# Patient Record
Sex: MALE | Race: WHITE | NOT HISPANIC OR LATINO | Employment: OTHER | URBAN - METROPOLITAN AREA
[De-identification: names, ages, dates, MRNs, and addresses within clinical notes are randomized per-mention and may not be internally consistent; named-entity substitution may affect disease eponyms.]

---

## 2017-04-13 ENCOUNTER — GENERIC CONVERSION - ENCOUNTER (OUTPATIENT)
Dept: OTHER | Facility: OTHER | Age: 67
End: 2017-04-13

## 2017-06-16 ENCOUNTER — GENERIC CONVERSION - ENCOUNTER (OUTPATIENT)
Dept: OTHER | Facility: OTHER | Age: 67
End: 2017-06-16

## 2017-08-15 ENCOUNTER — ALLSCRIPTS OFFICE VISIT (OUTPATIENT)
Dept: OTHER | Facility: OTHER | Age: 67
End: 2017-08-15

## 2017-08-15 DIAGNOSIS — E03.9 HYPOTHYROIDISM: ICD-10-CM

## 2017-08-15 DIAGNOSIS — K21.9 GASTRO-ESOPHAGEAL REFLUX DISEASE WITHOUT ESOPHAGITIS: ICD-10-CM

## 2017-08-15 DIAGNOSIS — I10 ESSENTIAL (PRIMARY) HYPERTENSION: ICD-10-CM

## 2017-08-15 DIAGNOSIS — F32.9 MAJOR DEPRESSIVE DISORDER, SINGLE EPISODE: ICD-10-CM

## 2017-08-15 DIAGNOSIS — R73.9 HYPERGLYCEMIA: ICD-10-CM

## 2017-08-15 DIAGNOSIS — F41.9 ANXIETY DISORDER: ICD-10-CM

## 2017-08-15 DIAGNOSIS — R07.9 CHEST PAIN: ICD-10-CM

## 2017-08-16 ENCOUNTER — GENERIC CONVERSION - ENCOUNTER (OUTPATIENT)
Dept: OTHER | Facility: OTHER | Age: 67
End: 2017-08-16

## 2017-08-16 ENCOUNTER — APPOINTMENT (OUTPATIENT)
Dept: LAB | Facility: CLINIC | Age: 67
End: 2017-08-16
Payer: MEDICARE

## 2017-08-16 ENCOUNTER — TRANSCRIBE ORDERS (OUTPATIENT)
Dept: LAB | Facility: CLINIC | Age: 67
End: 2017-08-16

## 2017-08-16 DIAGNOSIS — F41.9 ANXIETY DISORDER: ICD-10-CM

## 2017-08-16 DIAGNOSIS — R73.9 HYPERGLYCEMIA: ICD-10-CM

## 2017-08-16 DIAGNOSIS — E03.9 HYPOTHYROIDISM: ICD-10-CM

## 2017-08-16 DIAGNOSIS — I10 ESSENTIAL (PRIMARY) HYPERTENSION: ICD-10-CM

## 2017-08-16 DIAGNOSIS — K21.9 GASTRO-ESOPHAGEAL REFLUX DISEASE WITHOUT ESOPHAGITIS: ICD-10-CM

## 2017-08-16 DIAGNOSIS — F32.9 MAJOR DEPRESSIVE DISORDER, SINGLE EPISODE: ICD-10-CM

## 2017-08-16 LAB
ALBUMIN SERPL BCP-MCNC: 3.8 G/DL (ref 3.5–5)
ALP SERPL-CCNC: 59 U/L (ref 46–116)
ALT SERPL W P-5'-P-CCNC: 19 U/L (ref 12–78)
ANION GAP SERPL CALCULATED.3IONS-SCNC: 7 MMOL/L (ref 4–13)
AST SERPL W P-5'-P-CCNC: 23 U/L (ref 5–45)
BILIRUB SERPL-MCNC: 0.41 MG/DL (ref 0.2–1)
BUN SERPL-MCNC: 16 MG/DL (ref 5–25)
CALCIUM SERPL-MCNC: 8.8 MG/DL (ref 8.3–10.1)
CHLORIDE SERPL-SCNC: 105 MMOL/L (ref 100–108)
CHOLEST SERPL-MCNC: 200 MG/DL (ref 50–200)
CO2 SERPL-SCNC: 28 MMOL/L (ref 21–32)
CREAT SERPL-MCNC: 1.03 MG/DL (ref 0.6–1.3)
GFR SERPL CREATININE-BSD FRML MDRD: 75 ML/MIN/1.73SQ M
GLUCOSE P FAST SERPL-MCNC: 94 MG/DL (ref 65–99)
HDLC SERPL-MCNC: 56 MG/DL (ref 40–60)
LDLC SERPL CALC-MCNC: 127 MG/DL (ref 0–100)
POTASSIUM SERPL-SCNC: 4.3 MMOL/L (ref 3.5–5.3)
PROT SERPL-MCNC: 7.4 G/DL (ref 6.4–8.2)
SODIUM SERPL-SCNC: 140 MMOL/L (ref 136–145)
TRIGL SERPL-MCNC: 87 MG/DL
TSH SERPL DL<=0.05 MIU/L-ACNC: 6.06 UIU/ML (ref 0.36–3.74)

## 2017-08-16 PROCEDURE — 80053 COMPREHEN METABOLIC PANEL: CPT

## 2017-08-16 PROCEDURE — 80061 LIPID PANEL: CPT

## 2017-08-16 PROCEDURE — 36415 COLL VENOUS BLD VENIPUNCTURE: CPT

## 2017-08-16 PROCEDURE — 84443 ASSAY THYROID STIM HORMONE: CPT

## 2017-08-21 ENCOUNTER — HOSPITAL ENCOUNTER (OUTPATIENT)
Dept: NON INVASIVE DIAGNOSTICS | Facility: CLINIC | Age: 67
Discharge: HOME | End: 2017-08-21
Payer: MEDICARE

## 2017-08-21 ENCOUNTER — GENERIC CONVERSION - ENCOUNTER (OUTPATIENT)
Dept: OTHER | Facility: OTHER | Age: 67
End: 2017-08-21

## 2017-08-21 DIAGNOSIS — R07.9 CHEST PAIN: ICD-10-CM

## 2017-08-21 LAB
ARRHY DURING EX: NORMAL
CHEST PAIN STATEMENT: NORMAL
MAX DIASTOLIC BP: 74 MMHG
MAX HEART RATE: 136 BPM
MAX PREDICTED HEART RATE: 154 BPM
MAX. SYSTOLIC BP: 172 MMHG
PROTOCOL NAME: NORMAL
TARGET HR FORMULA: NORMAL
TEST INDICATION: NORMAL
TIME IN EXERCISE PHASE: 719 S

## 2017-08-21 PROCEDURE — 93350 STRESS TTE ONLY: CPT

## 2017-09-21 ENCOUNTER — ALLSCRIPTS OFFICE VISIT (OUTPATIENT)
Dept: OTHER | Facility: OTHER | Age: 67
End: 2017-09-21

## 2017-10-11 DIAGNOSIS — E03.9 HYPOTHYROIDISM: ICD-10-CM

## 2017-10-27 NOTE — PROGRESS NOTES
Discussion/Summary  Cardiology Discussion Summary Free Text Note Form [facility]:   # Chest heaviness: Atypical symptoms w/ some typical features  He has had a stress echo 8/21/17 that showed upsloping ST depressions during exercise that became downsloping post exercise and borderline for ischemia --1mm  However there were no WMA  Given this finding and the fact that he reached 11 METs, we will continue to monitor him  If his symptoms progress, we will re-evaluate further testing  Possible MSK as appears to be related to lifting  Losartantx of anxiety and depressiongraded exercise program  Patient educated on alarm symptoms to watch for (ie progressive heaviness, chest pain that does not resolve, etc)  He will call the clinic or call 911  in 3 months  Chief Complaint  Chief Complaint Free Text Note Form: Chest heaviness      History of Present Illness  Cardiology HPI [facility] Note [facility]: Very pleasant 77year old gentleman w/ PMHx of HTN, Depression, Anxiety, and hypothryoidism p/f evaluation of chest heaviness  It occurs with light activity and can last 5-6 minutes with improvement  Sometimes after he rests, he can do more strenuous activity  No problems going up stairs  He gets winded going up one flight of stairs  The heaviness comes on more with light lifting, but once with heavy lifting  He feels it is much more frequent since may or june  As a result he had a stress echo on 8/21/17 that showed upsloping ST depressions during exercise that became downsloping post exercise and borderline for ischemia --1mm  However there were no WMA  Review of Systems  Cardiology Male ROS:     Cardiac: as noted in HPI  Skin: No complaints of nonhealing sores or skin rash  Genitourinary: No complaints of recurrent urinary tract infections, frequent urination at night, difficult urination, blood in urine, kidney stones, loss of bladder control, no kidney or prostate problems, no erectile dysfunction  Psychological: No complaints of feeling depressed, anxiety, panic attacks, or difficulty concentrating  General: No complaints of trouble sleeping, lack of energy, fatigue, appetite changes, weight changes, fever, frequent infections, or night sweats  Respiratory: No complaints of shortness of breath, cough with sputum, or wheezing  HEENT: No complaints of serious problems, hearing problems, nose problems, throat problems, or snoring  Gastrointestinal: No complaints of liver problems, nausea, vomiting, heartburn, constipation, bloody stools, diarrhea, problems swallowing, adbominal pain, or rectal bleeding  Hematologic: No complaints of bleeding disorders, anemia, blood clots, or excessive brusing  Neurological: No complaints of numbness, tingling, dizziness, weakness, seizures, headaches, syncope or fainting, AM fatigue, daytime sleepiness, no witnessed apnea episodes  Musculoskeletal: No complaints of arthritis, back pain, or painfull swelling  ROS Reviewed:   ROS reviewed  Active Problems  Problems    1  Abnormal EKG (794 31) (R94 31)   2  Acute viral pharyngitis (462) (J02 8,B97 89)   3  Anemia (285 9) (D64 9)   4  Anxiety (300 00) (F41 9)   5  Bladder cancer (188 9) (C67 9)   6  Cataract (366 9) (H26 9)   7  Chest discomfort (786 59) (R07 89)   8  Chest pain on exertion (786 50) (R07 9)   9  Depression (311) (F32 9)   10  Double vision (368 2) (H53 2)   11  Equivocal stress test (794 39) (R94 39)   12  GERD without esophagitis (530 81) (K21 9)   13  Hyperglycemia (790 29) (R73 9)   14  Hypertension (401 9) (I10)   15  Hypothyroidism (244 9) (E03 9)   16  Medicare welcome exam (V70 0) (Z00 00)   17  Neck pain (723 1) (M54 2)   18  Need for influenza vaccination (V04 81) (Z23)   19  Need for pneumococcal vaccination (V03 82) (Z23)   20  Pre-op examination (V72 84) (Z01 818)   21  Screening for AAA (abdominal aortic aneurysm) (V81 2) (Z13 6)   22   Skin lump of leg, left (782 2) (R22 42) 23  Welcome to Medicare preventive visit (V70 0) (Z00 00)    Past Medical History  Problems    1  History of Bladder Cancer (V10 51)   2  History of Depression (311) (F32 9)   3  History of anemia (V12 3) (Z86 2)   4  History of hypertension (V12 59) (Z86 79)   5  History of hypothyroidism (V12 29) (Z86 39)  Active Problems And Past Medical History Reviewed: The active problems and past medical history were reviewed and updated today  Surgical History  Problems    1  History of Bladder Surgery   2  History of Cholecystectomy Laparoscopic   3  History of Knee Arthroscopy (Therapeutic)  Surgical History Reviewed: The surgical history was reviewed and updated today  Family History  Mother    1  Family history of Breast Cancer (V16 3)   2  Family history of Generalized Anxiety Disorder   3  Family history of Stroke Syndrome (V17 1)  Father    4  Family history of Alzheimer Disease   5  Family history of Hypertension (V17 49)  Brother    6  Family history of Prostate Cancer (T80 71)  Family History Reviewed: The family history was reviewed and updated today  Social History  Problems    · Activities Leisure Reading Books   · Former smoker (T14 38) (J73 366)   · Marital History - Currently    · No secondhand smoke exposure (V49 89) (Z78 9)   · Recreational Activities Running  Social History Reviewed: The social history was reviewed and updated today  The social history was reviewed and is unchanged  Current Meds   1  LaMICtal 200 MG Oral Tablet (LamoTRIgine); TAKE 1 TABLET TWICE DAILY; Therapy: (Recorded:51Ccg5266) to Recorded   2  Levothyroxine Sodium 112 MCG Oral Tablet; Take 1 tablet by mouth daily; Therapy: 78Ylq5793 to (Evaluate:59Rzp1451)  Requested for: 21Osn1911; Last   Rx:46Upq7407 Ordered   3  Lexapro 5 MG Oral Tablet (Escitalopram Oxalate); TAKE 1 TABLET DAILY; Therapy: ([facility]) to Recorded   4   Losartan Potassium 50 MG Oral Tablet; TAKE 1 TABLET DAILY; Therapy: 40Lel7175 to ([phone])  Requested for: 35MCC3442; Last   Rx:58Ixb1382 Ordered   5  TraZODone HCl - 100 MG Oral Tablet; TAKE 1 TABLET AT BEDTIME; Therapy: 24SAE0136 to Recorded   6  Wellbutrin  MG Oral Tablet Extended Release 24 Hour (BuPROPion HCl ER (XL));   TAKE 1 TABLET DAILY; Therapy: (Recorded:21Sep2017) to Recorded  Medication List Reviewed: The medication list was reviewed and updated today  Allergies  Medication    1  No Known Drug Allergies    Vitals  Vital Signs    Recorded: 21THG3382 02:31PM   Heart Rate 60, L Radial   Systolic 438, RUE, Sitting   Diastolic 60, RUE, Sitting   BP CUFF SIZE Large   Height 5 ft 7 in   Weight 168 lb    BMI Calculated 26 31   BSA Calculated 1 88   O2 Saturation 98     Physical Exam    Constitutional   General appearance: No acute distress, well appearing and well nourished  Eyes   Conjunctiva and Sclera examination: Conjunctiva pink, sclera anicteric  Ears, Nose, Mouth, and Throat - Oropharynx: Clear, nares are clear, mucous membranes are moist    Neck   Neck and thyroid: Normal, supple, trachea midline, no thyromegaly  Pulmonary   Respiratory effort: No increased work of breathing or signs of respiratory distress  Auscultation of lungs: Clear to auscultation, no rales, no rhonchi, no wheezing, good air movement  Cardiovascular   Auscultation of heart: Normal rate and rhythm, normal S1 and S2, no murmurs  Carotid pulses: Normal, 2+ bilaterally  Peripheral vascular exam: Normal pulses throughout, no tenderness, erythema or swelling  Pedal pulses: Normal, 2+ bilaterally  Examination of extremities for edema and/or varicosities: Normal     Abdomen   Abdomen: Non-tender and no distention  Liver and spleen: No hepatomegaly or splenomegaly  Musculoskeletal Gait and station: Normal gait  -- Digits and nails: Normal without clubbing or cyanosis  -- Inspection/palpation of joints, bones, and muscles: Normal, ROM normal     Skin - Skin and subcutaneous tissue: Normal without rashes or lesions  Skin is warm and well perfused, normal turgor  Neurologic - Cranial nerves: II - XII intact  -- Speech: Normal     Psychiatric - Orientation to person, place, and time: Normal -- Mood and affect: Normal       Signatures   Electronically signed by : ESTRELLITA Garcia  Sep 21 2017  2:50PM EST                       (Author)

## 2017-12-01 ENCOUNTER — ALLSCRIPTS OFFICE VISIT (OUTPATIENT)
Dept: OTHER | Facility: OTHER | Age: 67
End: 2017-12-01

## 2017-12-05 NOTE — PROGRESS NOTES
Discussion/Summary  Cardiology Discussion Summary Free Text Note Form [facility]:   # Chest heaviness: Atypical symptoms w/ some typical features  He has had a stress echo 8/21/17 that showed upsloping ST depressions during exercise that became downsloping post exercise and borderline for ischemia --1mm  However there were no WMA  Given this finding and the fact that he reached 11 METs, we will continue to monitor him  If his symptoms progress, we will re-evaluate with further testing  Possible MSK as appears to be related to lifting  Losartantx of anxiety and depressiongraded exercise program  Patient educated on alarm symptoms to watch for (ie progressive heaviness, chest pain that does not resolve, etc)  He will call the clinic or call 911   prn  Chief Complaint  Chief Complaint Free Text Note Form: F/U      History of Present Illness  Cardiology HPI Free Text Note [facility]: Very pleasant 77year old gentleman w/ PMHx of HTN, Depression, Anxiety, and hypothryoidism p/f evaluation of chest heaviness  It occurs with light activity and can last 5-6 minutes with improvement  Sometimes after he rests, he can do more strenuous activity  No problems going up stairs  He gets winded going up one flight of stairs  The heaviness comes on more with light lifting, but once with heavy lifting  He feels it is much more frequent since may or june  As a result he had a stress echo on 8/21/17 that showed upsloping ST depressions during exercise that became downsloping post exercise and borderline for ischemia --1mm  However there were no WMA  12/1/2017, he states he feels well  He went and rode a bike and did elliptical for about an hour without any symptoms  Symptoms more mild and less frequent  Review of Systems  Cardiology Male ROS:    Cardiac: as noted in HPI  Skin: No complaints of nonhealing sores or skin rash    Genitourinary: No complaints of recurrent urinary tract infections, frequent urination at night, difficult urination, blood in urine, kidney stones, loss of bladder control, no kidney or prostate problems, no erectile dysfunction  Psychological: No complaints of feeling depressed, anxiety, panic attacks, or difficulty concentrating  General: No complaints of trouble sleeping, lack of energy, fatigue, appetite changes, weight changes, fever, frequent infections, or night sweats  Respiratory: No complaints of shortness of breath, cough with sputum, or wheezing  HEENT: No complaints of serious problems, hearing problems, nose problems, throat problems, or snoring  Gastrointestinal: No complaints of liver problems, nausea, vomiting, heartburn, constipation, bloody stools, diarrhea, problems swallowing, adbominal pain, or rectal bleeding  Hematologic: No complaints of bleeding disorders, anemia, blood clots, or excessive brusing  Neurological: No complaints of numbness, tingling, dizziness, weakness, seizures, headaches, syncope or fainting, AM fatigue, daytime sleepiness, no witnessed apnea episodes  Musculoskeletal: No complaints of arthritis, back pain, or painfull swelling  ROS Reviewed:   ROS reviewed  Active Problems  Problems    1  Abnormal EKG (794 31) (R94 31)   2  Acute viral pharyngitis (462) (J02 8,B97 89)   3  Anemia (285 9) (D64 9)   4  Anxiety (300 00) (F41 9)   5  Bladder cancer (188 9) (C67 9)   6  Cataract (366 9) (H26 9)   7  Chest discomfort (786 59) (R07 89)   8  Chest pain on exertion (786 50) (R07 9)   9  Depression (311) (F32 9)   10  Double vision (368 2) (H53 2)   11  Equivocal stress test (794 39) (R94 39)   12  GERD without esophagitis (530 81) (K21 9)   13  Hyperglycemia (790 29) (R73 9)   14  Hypertension (401 9) (I10)   15  Hypothyroidism (244 9) (E03 9)   16  Medicare welcome exam (V70 0) (Z00 00)   17  Neck pain (723 1) (M54 2)   18  Need for influenza vaccination (V04 81) (Z23)   19  Need for pneumococcal vaccination (V03 82) (Z23)   20   Pre-op examination (V72 84) (Z01 818)   21  Screening for AAA (abdominal aortic aneurysm) (V81 2) (Z13 6)   22  Skin lump of leg, left (782 2) (R22 42)   23  Welcome to Medicare preventive visit (V70 0) (Z00 00)    Past Medical History  Problems    1  History of Bladder Cancer (V10 51)   2  History of Depression (311) (F32 9)   3  History of anemia (V12 3) (Z86 2)   4  History of hypertension (V12 59) (Z86 79)   5  History of hypothyroidism (V12 29) (Z86 39)  Active Problems And Past Medical History Reviewed: The active problems and past medical history were reviewed and updated today  Surgical History  Problems    1  History of Bladder Surgery   2  History of Cholecystectomy Laparoscopic   3  History of Knee Arthroscopy (Therapeutic)  Surgical History Reviewed: The surgical history was reviewed and updated today  Family History  Mother    1  Family history of Breast Cancer (V16 3)   2  Family history of Generalized Anxiety Disorder   3  Family history of Stroke Syndrome (V17 1)  Father    4  Family history of Alzheimer Disease   5  Family history of Hypertension (V17 49)  Brother    6  Family history of Prostate Cancer (V62 60)  Family History Reviewed: The family history was reviewed and updated today  Social History  Problems    · Activities Leisure Reading Books   · Former smoker (R38 55) (Q88 141)   · Marital History - Currently    · No secondhand smoke exposure (V49 89) (Z78 9)   · Recreational Activities Running  Social History Reviewed: The social history was reviewed and updated today  The social history was reviewed and is unchanged  Current Meds   1  LaMICtal 200 MG Oral Tablet; TAKE 1 TABLET TWICE DAILY; Therapy: (Recorded:56Ifq0934) to Recorded   2  Levothyroxine Sodium 112 MCG Oral Tablet; Take 1 tablet by mouth daily; Therapy: 16Myb6904 to (Evaluate:01Apr2018)  Requested for: 63ICL7582; Last Rx:03Oct2017 Ordered   3  Lexapro 5 MG Oral Tablet; TAKE 1 TABLET DAILY;  Therapy: (Andreas Lombard) to Recorded   4  Losartan Potassium 50 MG Oral Tablet; TAKE 1 TABLET DAILY; Therapy: 13Sep2012 to (Sergio Schwab)  Requested for: 02HRA4521; Last Rx:18Bop3440 Ordered   5  TraZODone HCl - 100 MG Oral Tablet; TAKE 1 TABLET AT BEDTIME; Therapy: 65HQE7242 to Recorded   6  Wellbutrin  MG Oral Tablet Extended Release 24 Hour; TAKE 1 TABLET DAILY; Therapy: (Recorded:21Sep2017) to Recorded  Medication List Reviewed: The medication list was reviewed and updated today  Allergies  Medication    1  No Known Drug Allergies    Vitals  Vital Signs    Recorded: 74ZGM2588 03:26PM   Heart Rate 54   Systolic 584, RUE, Sitting   Diastolic 72, RUE, Sitting   Height 5 ft 7 in   Weight 176 lb 5 oz   BMI Calculated 27 61   BSA Calculated 1 92   O2 Saturation 98       Physical Exam   Constitutional  General appearance: No acute distress, well appearing and well nourished  Eyes  Conjunctiva and Sclera examination: Conjunctiva pink, sclera anicteric  Ears, Nose, Mouth, and Throat - Oropharynx: Clear, nares are clear, mucous membranes are moist   Neck  Neck and thyroid: Normal, supple, trachea midline, no thyromegaly  Pulmonary  Respiratory effort: No increased work of breathing or signs of respiratory distress  Auscultation of lungs: Clear to auscultation, no rales, no rhonchi, no wheezing, good air movement  Cardiovascular  Auscultation of heart: Normal rate and rhythm, normal S1 and S2, no murmurs  Carotid pulses: Normal, 2+ bilaterally  Peripheral vascular exam: Normal pulses throughout, no tenderness, erythema or swelling  Pedal pulses: Normal, 2+ bilaterally  Examination of extremities for edema and/or varicosities: Normal    Abdomen  Abdomen: Non-tender and no distention  Liver and spleen: No hepatomegaly or splenomegaly  Musculoskeletal Gait and station: Normal gait  -- Digits and nails: Normal without clubbing or cyanosis  -- Inspection/palpation of joints, bones, and muscles: Normal, ROM normal  Skin - Skin and subcutaneous tissue: Normal without rashes or lesions  Skin is warm and well perfused, normal turgor  Neurologic - Cranial nerves: II - XII intact  -- Speech: Normal    Psychiatric - Orientation to person, place, and time: Normal -- Mood and affect: Normal       Results/Data  Diagnostic Studies Reviewed Cardio: I personally reviewed the recording/images in the office today  My interpretation follows  Future Appointments    Date/Time Provider Specialty Site   06/22/2018 01:00 PM Emely Dial MD Urology [address] [place] [address]       Signatures   Electronically signed by : ESTRELLITA Marie  Dec  1 2017  3:34PM EST                       (Author)

## 2018-01-11 NOTE — PROGRESS NOTES
Assessment    1  Welcome to Medicare preventive visit (V70 0) (Z00 00)   2  Anxiety (300 00) (F41 9)   3  Depression (311) (F32 9)   4  Screening for AAA (abdominal aortic aneurysm) (V81 2) (Z13 6)   5  Hypothyroidism (244 9) (E03 9)   6  Former smoker (V15 82) (X92 330)    Plan  Health Maintenance, Welcome to Medicare preventive visit    · *VB-Urinary Incontinence Screen (Dx V81 6 Screen for UI); Status:Complete;   Done:  32GPM5256 02:32PM  Hypothyroidism    · (1) TSH; Status:Active; Requested for:82Khc6800;   Medicare welcome exam    · EKG/ECG- POC; Status:Complete;   Done: 06LLP9913 01:57PM  Screening for AAA (abdominal aortic aneurysm), Welcome to Medicare preventive visit    · [address]; Status:Active; Requested GOB:[id number];     Discussion/Summary    #1  Welcome to Medicare examination- EKG done today  AAA screening U/S ordered  Labs up to date  Seeing urology  #2  anxiety/depression- continue with psychiatry f/u and medications  #3  advanced directives- pt encouraged to seek living will/ advanced directives  5 wishes info not available to share with pt at this time  Impression: Welcome to Medicare Visit, with preventive exam as well as age and risk appropriate counseling completed  Cardiovascular screening and counseling: screening is current and EKG recommended  Diabetes screening and counseling: screening is current and counseling was given on ways to improve physical activity  Colorectal cancer screening and counseling: screening is current  Prostate cancer screening and counseling: screening is current  Osteoporosis screening and counseling: screening not indicated  Abdominal aortic aneurysm screening and counseling: screening US recommended  Glaucoma screening and counseling: screening is current  HIV screening and counseling: screening not indicated  Immunizations: (Recommend prevnar 13 this fall one year after his first pneumovax  )     Advance Directive Planning: not complete, he was encouraged to follow-up with me to discuss his questions and/or decisions  Patient Discussion: follow-up visit needed in one year  Chief Complaint  [facility]  a[place]      Advance Directives  Advance Directive [address]:   The patient is not in agreement to receive the Advance Care Planning service    NO - Patient does not have an advance health care directive  History of Present Illness  HPI: Pt  here for his welcome to medicare initial visit/examination  EKG will be done today  No c/o  Does not have advanced directive  Eats healthy but does not exercise due to knee problems and pain  Welcome to [employer] and Wellness Visits: The patient is being seen for the welcome to medicare visit  Medicare Screening and Risk Factors   Hospitalizations: no previous hospitalizations  Medicare Screening Tests Risk Questions   Drug and Alcohol Use: The patient is a former cigarette smoker and quit smoking 1983  The patient reports never drinking alcohol  He has previously used illicit drugs  Diet and Physical Activity: Current diet includes well balanced meals, 1 servings of fruit per day, 2 servings of vegetables per day, 0 75 servings of meat per day, 0 25 servings of whole grains per day, 2 servings of simple carbohydrates per day, 2 servings of dairy products per day, 2 cups of coffee per day and 4 glasses of water  He exercises infrequently  Exercise: walking 1 hours per week  Mood Disorder and Cognitive Impairment Screening: He reports feeling down, depressed, or hopeless over the past two weeks  He denies feeling little interest or pleasure in doing things over the past two weeks  Cognitive impairment screening: denies difficulty learning/retaining new information, denies difficulty handling complex tasks, denies difficulty with reasoning, denies difficulty with spatial ability and orientation, denies difficulty with language and denies difficulty with behavior  Functional Ability/Level of Safety: Hearing is normal bilaterally and a hearing aid is used  He denies hearing difficulties  Activities of daily living details: does not need help using the phone, no transportation help needed, does not need help shopping, no meal preparation help needed, does not need help doing housework, does not need help doing laundry, does not need help managing medications and does not need help managing money  Home safety risk factors:  no grab bars in the bathroom and no handrails on the stairs, but no unfamiliar surroundings, no loose rugs, no poor household lighting, no uneven floors and no household clutter  Advance Directives: Advance directives: no living will, no durable power of  for health care directives and no advance directives  Co-Managers and Medical Equipment/Suppliers: See Patient Care Team   Preventive Quality Program 65 and Older: The patient currently has no urinary incontinence symptoms  Patient Care Team    Care Team Member Role Specialty Office Number   [facility]  Physician Assistant [phone]     Review of Systems    Constitutional: negative  Eyes: negative  ENT: negative  Cardiovascular: negative  Respiratory: negative  Gastrointestinal: negative  Genitourinary: negative  Musculoskeletal: negative  Integumentary and Breasts: negative  Neurological: negative  Psychiatric: negative  Endocrine: negative  Hematologic and Lymphatic: negative  Active Problems    1  Acute viral pharyngitis (462) (J02 8,B97 89)   2  Anemia (285 9) (D64 9)   3  Anxiety (300 00) (F41 9)   4  Bladder cancer (188 9) (C67 9)   5  Depression (311) (F32 9)   6  Double vision (368 2) (H53 2)   7  GERD without esophagitis (530 81) (K21 9)   8  Hyperglycemia (790 29) (R73 9)   9  Hypertension (401 9) (I10)   10  Hypothyroidism (244 9) (E03 9)   11  Neck pain (723 1) (M54 2)   12   Need for pneumococcal vaccination (V03 82) (Z23)    Past Medical History    · History of Bladder Cancer (V10 51)   · History of Depression (311) (F32 9)   · History of anemia (V12 3) (Z86 2)   · History of hypertension (V12 59) (Z86 79)   · History of hypothyroidism (V12 29) (Z86 39)    The active problems and past medical history were reviewed and updated today  Surgical History    · History of Bladder Surgery   · History of Cholecystectomy Laparoscopic   · History of Knee Arthroscopy    The surgical history was reviewed and updated today  Family History  Mother    · Family history of Breast Cancer (V16 3)   · Family history of Generalized Anxiety Disorder   · Family history of Stroke Syndrome (V17 1)  Father    · Family history of Alzheimer Disease   · Family history of Hypertension (V17 49)  Brother    · Family history of Prostate Cancer (V16 42)    The family history was reviewed and updated today  Social History    · Activities Leisure Reading Books   · Former smoker (P15 49) (P32 871)   · quit in [address] [place] [address]   · Marital History - Currently    · No secondhand smoke exposure (V49 89) (Z78 9)   · Recreational Activities Running  The social history was reviewed and updated today  Current Meds   1  LaMICtal 200 MG Oral Tablet; Therapy: (Recorded:23Bcz1929) to Recorded   2  Levothyroxine Sodium 112 MCG Oral Tablet; Take 1 tablet by mouth daily; Therapy: 74Iwl2369 to (Evaluate:67Ekr1333)  Requested for: 43Rwg6282; Last   Rx:60Ssw6446 Ordered   3  Losartan Potassium 50 MG Oral Tablet; TAKE 1 TABLET DAILY; Therapy: 88Ygf4490 to (Evaluate:86Bct9879)  Requested for: 12GZL4020; Last   Rx:09Twg1388 Ordered   4  TraZODone HCl - 150 MG Oral Tablet; Therapy: 75XSO0000 to (Evaluate:10Nov2014) Recorded   5  Wellbutrin  MG Oral Tablet Extended Release 24 Hour; Therapy: (Recorded:01Rmm2450) to Recorded    Allergies    1   No Known Drug Allergies    Immunizations   1    Pneumococcal  85WAF3878    Zoster  70Wxw5988     Vitals  Signs [Data Includes: Current Encounter]    Heart Rate: 60  Systolic: 066, LUE, Sitting  Diastolic: 70, LUE, Sitting  Height: 5 ft 7 in  Weight: 168 lb 2 08 oz  BMI Calculated: 26 33  BSA Calculated: 1 88    Physical Exam    Constitutional   General appearance: No acute distress, well appearing and well nourished  Head and Face   Head and face: Normal     Palpation of the face and sinuses: No sinus tenderness  Eyes   Conjunctiva and lids: No erythema, swelling or discharge  Pupils and irises: Equal, round, reactive to light  Ears, Nose, Mouth, and Throat   External inspection of ears and nose: Normal     Otoscopic examination: Tympanic membranes translucent with normal light reflex  Canals patent without erythema  Hearing: Normal     Nasal mucosa, septum, and turbinates: Normal without edema or erythema  Lips, teeth, and gums: Normal, good dentition  Oropharynx: Normal with no erythema, edema, exudate or lesions  Neck   Neck: Supple, symmetric, trachea midline, no masses  Thyroid: Normal, no thyromegaly  Pulmonary   Respiratory effort: No increased work of breathing or signs of respiratory distress  Auscultation of lungs: Clear to auscultation  Cardiovascular   Auscultation of heart: Normal rate and rhythm, normal S1 and S2, no murmurs  Examination of extremities for edema and/or varicosities: Normal     Abdomen   Abdomen: Non-tender, no masses  Genitourinary sees urology  sees urology  sees urology  Lymphatic   Palpation of lymph nodes in neck: No lymphadenopathy  Musculoskeletal   Gait and station: Normal     Inspection/palpation of digits and nails: Normal without clubbing or cyanosis  Inspection/palpation of joints, bones, and muscles: Normal     Neurologic   Coordination: Normal finger to nose and heel to shin      Psychiatric   Judgment and insight: Normal     Mood and affect: Normal        Results/Data  [employer] 20DYW5731 02:33PM User, s     Test Name Result Flag Reference   SBIRT Screen - Tobacco Screening Result Negative       EKG/ECG- POC 42VQB0524 01:57PM Radha Hutchinson     Test Name Result Flag Reference   EKG/ECG see scanned image         Procedure    Procedure: Visual Acuity Test    Indication: routine screening  Inforrmation supplied by  a Snellen chart     Results: 20/40 in both eyes with corrective device, 20/200 in the right eye with corrective device, 20/40 in the left eye with corrective device      Signatures   Electronically signed by : Ximena Ernandez, [facility]; Jun 8 2016  2:43PM EST                       (Author)    Electronically signed by : ESTRELLITA Jones ; [place] 10 2016  9:49PM EST

## 2018-01-12 VITALS
WEIGHT: 169.13 LBS | HEIGHT: 67 IN | SYSTOLIC BLOOD PRESSURE: 120 MMHG | BODY MASS INDEX: 26.55 KG/M2 | DIASTOLIC BLOOD PRESSURE: 60 MMHG | HEART RATE: 68 BPM

## 2018-01-15 VITALS
BODY MASS INDEX: 26.37 KG/M2 | HEIGHT: 67 IN | SYSTOLIC BLOOD PRESSURE: 140 MMHG | HEART RATE: 60 BPM | OXYGEN SATURATION: 98 % | WEIGHT: 168 LBS | DIASTOLIC BLOOD PRESSURE: 60 MMHG

## 2018-01-16 NOTE — RESULT NOTES
Discussion/Summary   Pls  call pt and let him know that another part of his stress test results hae become available  The EKG part of his stress test was "equivocal" per the reading cardiologist  Even though his stress echo part of the test was normal I would like him to be seen by a  cardiologist before starting to exersice at the gym to be safe and make sure no further  specific testing needs to be done  Verified Results  ECHO STRESS TEST W CONTRAST IF INDICATED 18Jhu7020 02:46PM Jessica Vidales Order Number: [id number]    - Patient Instructions: To schedule this appointment, please contact Central Scheduling at [phone]  Test Name Result Flag Reference   ECHO STRESS TEST W CONTRAST IF INDICATED (Report)     [facility]  [address], [address]   [phone]     Exercise Stress Echocardiography     Study date: 21-Aug-2017     Patient: Emely Welch   MR number: [id number]   Account number: [de-identified]   : 1950   Age: 77 years   Gender: Male   Study date: 21-Aug-2017   Status: Outpatient   Location: [facility] lab   Height: 67 in   Weight: 169 lb   BP: 136// 72 mmHg     Indications: Detection of coronary artery disease  Diagnosis: R07 9 - Chest pain, unspecified     Sonographer: PARTH Garner   Primary Physician: Otis Teixeira kishan [facility]   Referring Physician: Otis Graham PAC   Group: [facility]   RN: Janie Karimi RN BSN   Interpreting Physician: Kelly Leblanc MD     IMPRESSIONS:   Normal study after maximal exercise  The patient had borderline ECG evidence of ischemia without chest pain or echo evidence for ischemia to a HR of 136=86% MPHR  He exercised through stage 4 of the Brant protocol  SUMMARY     STRESS RESULTS:   Duration of exercise was 12 min  Maximal work rate was 11 METs     Maximal heart rate during stress was 136 bpm ( 88 % of maximal predicted heart rate)  Target heart rate was achieved  There was no chest pain during stress  ECG CONCLUSIONS:   The stress ECG was equivocal for ischemia  The patient had borderline ECG evidence of ischemia (upsloping ST depression during exercise which became downsloping post exercise and borderline for ischemia (approx 1 mm)  BASELINE:   Estimated left ventricular ejection fraction was 60 %   HISTORY: The patient is a 77year old male  Chest pain status: no chest pain  Coronary artery disease risk factors: hypertension  Cardiovascular history: none significant  Medications: an antihypertensive agent and thyroid medications  REST ECG: Normal sinus rhythm  Normal baseline ECG  PROCEDURE: The study was performed in the stress lab  The study was performed in the [address] [place] [address]  The procedure was explained to the patient and informed consent was obtained  Treadmill exercise testing was performed,   using the Kimberlee protocol  Stress and rest echocardiographic evaluation with 2D imaging, spectral Doppler, and color Doppler was performed from multiple acoustic windows for evaluation of ventricular function  KIMBERLEE PROTOCOL:   Symptoms   Baseline none     MODIFIED KIMBERLEE PROTOCOL:   HR bpm SBP mmHg DBP mmHg   Baseline 63 136 72   Stage 1 80 140 70   Stage 2 103 140 78   Stage 3 122 147 70   Stage 4 136 172 74   Immediate 136 172 74   Recovery 2 90 166 80   Recovery 5 73 142 70     MEDICATIONS GIVEN: No medications or fluids given  STRESS RESULTS: Duration of exercise was 12 min  The patient exercised to protocol stage 4  Maximal work rate was 11 METs  Maximal heart rate during stress was 136 bpm ( 88 % of maximal predicted heart rate)  Target heart rate was   achieved  The heart rate response to stress was normal  Maximal systolic blood pressure during stress was 172 mmHg  There was normal resting blood pressure with an appropriate response to stress   The rate-pressure product for the peak heart rate and blood pressure was 74124  There was no chest pain during stress  The stress test was terminated due to achievement of maximal (symptom limited) exercise and achievement of target heart rate  ECG CONCLUSIONS: The stress ECG was equivocal for ischemia  The patient had borderline ECG evidence of ischemia (upsloping ST depression during exercise which became downsloping post exercise and borderline for ischemia (approx 1 mm)  Arrhythmia during stress: isolated atrial premature beats  STRESS 2D ECHO RESULTS:     BASELINE: Left ventricular size was normal  Mild LVH  No significant valvular lesions Overall left ventricular systolic function was normal  Estimated left ventricular ejection fraction was 60 %   PEAK STRESS: There was an appropriate reduction in left ventricular size  There was an appropriate augmentation in LV function  Prepared and electronically signed by     Siva Espinosa MD   Signed [id number] 38:09:78       Signatures   Electronically signed by : Deborah Wagner, [facility];  Aug 21 2017  6:53PM EST                       (Author)

## 2018-01-16 NOTE — RESULT NOTES
Discussion/Summary   Please call patient and let him know that his LDL cholesterol is a little elevated and is normal at 127 no medication needed at this point  Recheck yearly  CMP normal however her TSH is mildly elevated  Because the levels only 6 I would like to have the patient continue on his current daily 112 Âµg strength of thyroid supplement and recheck in 6-8 weeks  TSH is ordered  Verified Results  (1) COMPREHENSIVE METABOLIC PANEL [id number] 41:55DN Linda Hendricks Order Number: [id number]     Test Name Result Flag Reference   SODIUM 140 mmol/L  136-145   POTASSIUM 4 3 mmol/L  3 5-5 3   CHLORIDE 105 mmol/L  100-108   CARBON DIOXIDE 28 mmol/L  21-32   ANION GAP (CALC) 7 mmol/L  4-13   BLOOD UREA NITROGEN 16 mg/dL  5-25   CREATININE 1 03 mg/dL  0 60-1 30   Standardized to IDMS reference method   CALCIUM 8 8 mg/dL  8 3-10 1   BILI, TOTAL 0 41 mg/dL  0 20-1 00   ALK PHOSPHATAS 59 U/L     ALT (SGPT) 19 U/L  12-78   Specimen collection should occur prior to Sulfasalazine and/or Sulfapyridine administration due to the potential for falsely depressed results  AST(SGOT) 23 U/L  5-45   Specimen collection should occur prior to Sulfasalazine administration due to the potential for falsely depressed results  ALBUMIN 3 8 g/dL  3 5-5 0   TOTAL PROTEIN 7 4 g/dL  6 4-8 2   eGFR 75 ml/min/1 73sq m     [facility] Disease Education Program recommendations are as follows:  GFR calculation is accurate only with a steady state creatinine  Chronic Kidney disease less than 60 ml/min/1 73 sq  meters  Kidney failure less than 15 ml/min/1 73 sq  meters  GLUCOSE FASTING 94 mg/dL  65-99   Specimen collection should occur prior to Sulfasalazine administration due to the potential for falsely depressed results  Specimen collection should occur prior to Sulfapyridine administration due to the potential for falsely elevated results       (1) TSH 41KRE3676 10:01AM [facility] Order Number: [id number]     Test Name Result Flag Reference   TSH 6 060 uIU/mL H 0 358-3 740   Patients undergoing fluorescein dye angiography may retain small amounts of fluorescein in the body for 48-72 hours post procedure  Samples containing fluorescein can produce falsely depressed TSH values  If the patient had this procedure,a specimen should be resubmitted post fluorescein clearance  (1) LIPID PANEL FASTING W DIRECT LDL REFLEX 03ODU1521 10:01AM Miguel Homans Order Number: [id number]     Test Name Result Flag Reference   CHOLESTEROL 200 mg/dL     LDL CHOLESTEROL CALCULATED 127 mg/dL H 0-100   Triglyceride:        Normal ??? ??? ??? ??? ??? ??? ??? <150 mg/dl   ??? ??? ???Borderline High ??? ??? 150-199 mg/dl   ??? ??? ? ?? High ??? ??? ??? ??? ??? ??? ??? 200-499 mg/dl   ??? ??? ? ??Very High ??? ??? ??? ??? ??? >499 mg/dl      Cholesterol:       Desirable ??? ??? ??? ??? <200 mg/dl   ??? ??? Borderline High ??? 200-239 mg/dl   ??? ??? High ??? ??? ??? ??? ??? ??? >239 mg/dl      HDL Cholesterol:       High ??? ???>59 mg/dL   ??? ??? Low ??? ??? <41 mg/dL      HDL Cholesterol:       High ??? ???>59 mg/dL   ??? ??? Low ??? ??? <41 mg/dL      This screening LDL is a calculated result  It does not have the accuracy of the Direct Measured LDL in the monitoring of patients with hyperlipidemia and/or statin therapy  Direct Measure LDL (BFF977) must be ordered separately in these patients  TRIGLYCERIDES 87 mg/dL  <=150   Specimen collection should occur prior to N-Acetylcysteine or Metamizole administration due to the potential for falsely depressed results  HDL,DIRECT 56 mg/dL  40-60   Specimen collection should occur prior to Metamizole administration due to the potential for falsley depressed results  Plan  Hypothyroidism    · Levothyroxine Sodium 112 MCG Oral Tablet; Take 1 tablet by mouth daily   · (1) TSH WITH FT4 REFLEX; Status:Active;  Requested for:70Rjm7064; Signatures   Electronically signed by : Fernando Velazquez, [facility];  Aug 16 2017  3:26PM EST                       (Author)

## 2018-01-17 NOTE — RESULT NOTES
Message   Pls call pt and let him know his TSH is now WNL  I have ordered a repeat for 6 months  Verified Results  (1) TSH 04Oct2016 01:51PM Mimi Espinosa Order Number: [id number]     Test Name Result Flag Reference   TSH 1 990 uIU/mL  0 358-3 740   Patients undergoing fluorescein dye angiography may retain small amounts of fluorescein in the body for 48-72 hours post procedure  Samples containing fluorescein can produce falsely depressed TSH values  If the patient had this procedure,a specimen should be resubmitted post fluorescein clearance  Plan  Hypothyroidism    · (1) TSH; Status:Active;  Requested for:04Apr2017;     Signatures   Electronically signed by : Luis E Zimmerman [facility]; Oct  4 2016  6:11PM EST                       (Author)

## 2018-01-22 VITALS
HEART RATE: 54 BPM | WEIGHT: 176.31 LBS | OXYGEN SATURATION: 98 % | DIASTOLIC BLOOD PRESSURE: 72 MMHG | BODY MASS INDEX: 27.67 KG/M2 | HEIGHT: 67 IN | SYSTOLIC BLOOD PRESSURE: 136 MMHG

## 2018-01-29 DIAGNOSIS — E03.9 ACQUIRED HYPOTHYROIDISM: Primary | ICD-10-CM

## 2018-01-29 RX ORDER — LEVOTHYROXINE SODIUM 112 UG/1
TABLET ORAL
Qty: 30 TABLET | Refills: 0 | Status: SHIPPED | OUTPATIENT
Start: 2018-01-29 | End: 2018-01-30 | Stop reason: SDUPTHER

## 2018-01-29 NOTE — TELEPHONE ENCOUNTER
LMOM for pt  That blood work would need to be done before next refill and that repeat blood work would be mailed to pt

## 2018-01-29 NOTE — TELEPHONE ENCOUNTER
Please let the patient know that I just refilled a 30 day with no refill supply of his thyroid medicine but he is overdue once again for a TSH as his last 1 was not normal   I have ordered a TSH of we can let him know he needs to have this done prior to his next refill

## 2018-01-30 DIAGNOSIS — E03.9 ACQUIRED HYPOTHYROIDISM: ICD-10-CM

## 2018-01-30 RX ORDER — LEVOTHYROXINE SODIUM 112 UG/1
TABLET ORAL
Qty: 30 TABLET | Refills: 0 | Status: SHIPPED | OUTPATIENT
Start: 2018-01-30 | End: 2018-08-04 | Stop reason: SDUPTHER

## 2018-02-16 DIAGNOSIS — E03.9 HYPOTHYROIDISM: ICD-10-CM

## 2018-06-25 DIAGNOSIS — R39.11 BENIGN PROSTATIC HYPERPLASIA WITH URINARY HESITANCY: Primary | ICD-10-CM

## 2018-06-25 DIAGNOSIS — N40.1 BENIGN PROSTATIC HYPERPLASIA WITH URINARY HESITANCY: Primary | ICD-10-CM

## 2018-06-27 RX ORDER — LAMOTRIGINE 200 MG/1
1 TABLET ORAL 2 TIMES DAILY
COMMUNITY

## 2018-06-27 RX ORDER — BUPROPION HYDROCHLORIDE 300 MG/1
1 TABLET ORAL DAILY
COMMUNITY
End: 2021-06-14

## 2018-06-27 RX ORDER — TRAZODONE HYDROCHLORIDE 50 MG/1
50 TABLET ORAL
COMMUNITY
Start: 2014-07-01

## 2018-06-27 RX ORDER — ESCITALOPRAM OXALATE 5 MG/1
1 TABLET ORAL DAILY
COMMUNITY

## 2018-06-27 RX ORDER — LOSARTAN POTASSIUM 50 MG/1
1 TABLET ORAL DAILY
COMMUNITY
Start: 2012-09-13 | End: 2018-07-06 | Stop reason: SDUPTHER

## 2018-06-29 ENCOUNTER — TRANSCRIBE ORDERS (OUTPATIENT)
Dept: ADMINISTRATIVE | Facility: HOSPITAL | Age: 68
End: 2018-06-29

## 2018-06-29 ENCOUNTER — APPOINTMENT (OUTPATIENT)
Dept: LAB | Facility: MEDICAL CENTER | Age: 68
End: 2018-06-29
Payer: MEDICARE

## 2018-06-29 DIAGNOSIS — E03.9 ACQUIRED HYPOTHYROIDISM: ICD-10-CM

## 2018-06-29 DIAGNOSIS — R39.11 BENIGN PROSTATIC HYPERPLASIA WITH URINARY HESITANCY: ICD-10-CM

## 2018-06-29 DIAGNOSIS — N40.1 BENIGN PROSTATIC HYPERPLASIA WITH URINARY HESITANCY: ICD-10-CM

## 2018-06-29 PROCEDURE — 84443 ASSAY THYROID STIM HORMONE: CPT

## 2018-06-29 PROCEDURE — 36415 COLL VENOUS BLD VENIPUNCTURE: CPT

## 2018-06-29 PROCEDURE — 84153 ASSAY OF PSA TOTAL: CPT

## 2018-06-30 LAB
PSA SERPL-MCNC: 2.4 NG/ML (ref 0–4)
TSH SERPL DL<=0.05 MIU/L-ACNC: 5.71 UIU/ML

## 2018-07-01 DIAGNOSIS — R73.9 HYPERGLYCEMIA: ICD-10-CM

## 2018-07-01 DIAGNOSIS — E03.9 ACQUIRED HYPOTHYROIDISM: Primary | ICD-10-CM

## 2018-07-01 DIAGNOSIS — I10 ESSENTIAL HYPERTENSION: ICD-10-CM

## 2018-07-03 ENCOUNTER — PROCEDURE VISIT (OUTPATIENT)
Dept: UROLOGY | Facility: MEDICAL CENTER | Age: 68
End: 2018-07-03
Payer: MEDICARE

## 2018-07-03 VITALS
BODY MASS INDEX: 26.53 KG/M2 | DIASTOLIC BLOOD PRESSURE: 72 MMHG | SYSTOLIC BLOOD PRESSURE: 136 MMHG | HEIGHT: 67 IN | WEIGHT: 169 LBS

## 2018-07-03 DIAGNOSIS — N40.1 BENIGN PROSTATIC HYPERPLASIA WITH URINARY OBSTRUCTION: ICD-10-CM

## 2018-07-03 DIAGNOSIS — N13.8 BENIGN PROSTATIC HYPERPLASIA WITH URINARY OBSTRUCTION: ICD-10-CM

## 2018-07-03 DIAGNOSIS — C67.2 MALIGNANT NEOPLASM OF LATERAL WALL OF URINARY BLADDER (HCC): Primary | ICD-10-CM

## 2018-07-03 PROBLEM — R94.31 ABNORMAL EKG: Status: ACTIVE | Noted: 2017-08-22

## 2018-07-03 PROBLEM — R94.39 EQUIVOCAL STRESS TEST: Status: ACTIVE | Noted: 2017-08-21

## 2018-07-03 PROBLEM — R07.9 CHEST PAIN ON EXERTION: Status: ACTIVE | Noted: 2017-08-15

## 2018-07-03 LAB
SL AMB  POCT GLUCOSE, UA: NORMAL
SL AMB LEUKOCYTE ESTERASE,UA: NORMAL
SL AMB POCT BILIRUBIN,UA: NORMAL
SL AMB POCT BLOOD,UA: NORMAL
SL AMB POCT CLARITY,UA: CLEAR
SL AMB POCT COLOR,UA: YELLOW
SL AMB POCT KETONES,UA: NORMAL
SL AMB POCT NITRITE,UA: NORMAL
SL AMB POCT PH,UA: 5.5
SL AMB POCT SPECIFIC GRAVITY,UA: 1.02
SL AMB POCT URINE PROTEIN: NORMAL
SL AMB POCT UROBILINOGEN: 0.2

## 2018-07-03 PROCEDURE — 81003 URINALYSIS AUTO W/O SCOPE: CPT | Performed by: UROLOGY

## 2018-07-03 PROCEDURE — 99214 OFFICE O/P EST MOD 30 MIN: CPT | Performed by: UROLOGY

## 2018-07-03 PROCEDURE — 52000 CYSTOURETHROSCOPY: CPT | Performed by: UROLOGY

## 2018-07-03 NOTE — PROGRESS NOTES
Assessment/Plan:    Bladder cancer ([facility] [address] )  No evidence of recurrence on cystoscopy  Urinalysis is negative  Upper tracts were normal on CT scan December 2015  FISH  test was negative in 2016  We will continue yearly cystoscopy and consider repeat upper tract testing next year  Benign prostatic hyperplasia with urinary obstruction  AUA symptom score is 11  He is voiding adequately and satisfied with his voiding pattern  PSA 2 4 on June 29th, 2018  We did discuss minimally invasive treatments for his lower tract symptoms including urolift  Diagnoses and all orders for this visit:    Malignant neoplasm of lateral wall of urinary bladder (HCC)  -     POCT urine dip auto non-scope    Benign prostatic hyperplasia with urinary obstruction  -     PSA Total, Diagnostic; Future    Other orders  -     Cystoscopy          Subjective:      Patient ID: Emory Donohue is a 79 y o  male  77-year-old male followed for lower tract symptoms and history of bladder cancer  He underwent transurethral resection of a bladder tumor 14 years ago  He has not had subsequent recurrence  He notes he voids with an adequate stream and empties his bladder well  He has no urgency or incontinence  He is getting up once a night to urinate  There is no history of gross hematuria, dysuria or urinary tract infection  No new back or flank pain  The following portions of the patient's history were reviewed and updated as appropriate: allergies, current medications, past family history, past medical history, past social history, past surgical history and problem list     Review of Systems   Constitutional: Negative for chills, diaphoresis, fatigue and fever  HENT: Negative  Eyes: Negative  Respiratory: Negative  Cardiovascular: Negative  Endocrine: Negative  Musculoskeletal: Negative  Skin: Negative  Allergic/Immunologic: Negative  Neurological: Negative  Hematological: Negative  Psychiatric/Behavioral: Negative  Objective:      /72 (BP Location: Left arm, Patient Position: Sitting)   Ht 5' 7" (1 702 m)   Wt 76 7 kg (169 lb)   BMI 26 47 kg/m²            Physical Exam   Constitutional: He is oriented to person, place, and time  He appears well-developed and well-nourished  HENT:   Head: Normocephalic and atraumatic  Eyes: Conjunctivae are normal    Neck: Neck supple  Cardiovascular: Normal rate  Pulmonary/Chest: Effort normal    Abdominal: Soft  Bowel sounds are normal  He exhibits no distension and no mass  There is no tenderness  There is no rebound, no guarding and no CVA tenderness  Hernia confirmed negative in the right inguinal area and confirmed negative in the left inguinal area  Genitourinary: Rectum normal, testes normal and penis normal  Prostate is enlarged  Prostate is not tender  Right testis shows no mass  Left testis shows no mass  No phimosis or hypospadias  Genitourinary Comments: Prostate 2+ enlarged and palpably benign  The prostate is symmetric and free of nodularity or induration  Musculoskeletal: He exhibits no edema  Neurological: He is alert and oriented to person, place, and time  Skin: Skin is warm and dry  Psychiatric: He has a normal mood and affect  His behavior is normal  Judgment and thought content normal    Vitals reviewed  Cystoscopy  Date/Time: 7/3/2018 10:45 AM  Performed by: [employer] Filler  Authorized by: Meribeth Filler     Procedure details: cystoscopy    Patient tolerance: Patient tolerated the procedure well with no immediate complications    Additional Procedure Details: Cystoscopy Procedure Note        Pre-operative Diagnosis:  Bladder cancer    Post-operative Diagnosis:  No evidence of recurrence      Procedure Details   The risks, benefits, complications, treatment options, and expected outcomes were discussed with the patient   The patient concurred with the proposed plan, giving informed consent  Cystoscopy was performed today under local anesthesia, using sterile technique  The patient was placed in the supine position, prepped and draped in the usual sterile fashion  A 15 [place]er flexible cystoscope  was used to inspect both the urethra and bladder  Findings:  Normal urethra, 30 g obstructing prostate with elevated median bar, ureteral orifices are normal   The bladder is moderately trabeculated  There are no stones, tumors or diverticula             Specimens:  None                          Discussion:  See progress notes

## 2018-07-03 NOTE — LETTER
July 3, 2018     Viky Parada, [address]    Patient: Isis White   YOB: 1950   Date of Visit: 7/3/2018       Dear Dr Edwardo Boss: Thank you for referring Isis White to me for evaluation  Below are my notes for this consultation  If you have questions, please do not hesitate to call me  I look forward to following your patient along with you  Sincerely,        Param Paniagua MD        CC: No Recipients  Param Paniagua MD  7/3/2018 10:48 AM  Sign at close encounter  Assessment/Plan:    Bladder cancer ([address] )  No evidence of recurrence on cystoscopy  Urinalysis is negative  Upper tracts were normal on CT scan December 2015  FISH  test was negative in 2016  We will continue yearly cystoscopy and consider repeat upper tract testing next year  Benign prostatic hyperplasia with urinary obstruction  AUA symptom score is 11  He is voiding adequately and satisfied with his voiding pattern  PSA 2 4 on June 29th, 2018  We did discuss minimally invasive treatments for his lower tract symptoms including urolift  Diagnoses and all orders for this visit:    Malignant neoplasm of lateral wall of urinary bladder (HCC)  -     POCT urine dip auto non-scope    Benign prostatic hyperplasia with urinary obstruction  -     PSA Total, Diagnostic; Future    Other orders  -     Cystoscopy          Subjective:      Patient ID: Isis White is a 79 y o  male  19-year-old male followed for lower tract symptoms and history of bladder cancer  He underwent transurethral resection of a bladder tumor 14 years ago  He has not had subsequent recurrence  He notes he voids with an adequate stream and empties his bladder well  He has no urgency or incontinence  He is getting up once a night to urinate  There is no history of gross hematuria, dysuria or urinary tract infection  No new back or flank pain          The following portions of the patient's history were reviewed and updated as appropriate: allergies, current medications, past family history, past medical history, past social history, past surgical history and problem list     Review of Systems   Constitutional: Negative for chills, diaphoresis, fatigue and fever  HENT: Negative  Eyes: Negative  Respiratory: Negative  Cardiovascular: Negative  Endocrine: Negative  Musculoskeletal: Negative  Skin: Negative  Allergic/Immunologic: Negative  Neurological: Negative  Hematological: Negative  Psychiatric/Behavioral: Negative  Objective:      /72 (BP Location: Left arm, Patient Position: Sitting)   Ht 5' 7" (1 702 m)   Wt 76 7 kg (169 lb)   BMI 26 47 kg/m²             Physical Exam   Constitutional: He is oriented to person, place, and time  He appears well-developed and well-nourished  HENT:   Head: Normocephalic and atraumatic  Eyes: Conjunctivae are normal    Neck: Neck supple  Cardiovascular: Normal rate  Pulmonary/Chest: Effort normal    Abdominal: Soft  Bowel sounds are normal  He exhibits no distension and no mass  There is no tenderness  There is no rebound, no guarding and no CVA tenderness  Hernia confirmed negative in the right inguinal area and confirmed negative in the left inguinal area  Genitourinary: Rectum normal, testes normal and penis normal  Prostate is enlarged  Prostate is not tender  Right testis shows no mass  Left testis shows no mass  No phimosis or hypospadias  Genitourinary Comments: Prostate 2+ enlarged and palpably benign  The prostate is symmetric and free of nodularity or induration  Musculoskeletal: He exhibits no edema  Neurological: He is alert and oriented to person, place, and time  Skin: Skin is warm and dry  Psychiatric: He has a normal mood and affect  His behavior is normal  Judgment and thought content normal    Vitals reviewed        Cystoscopy  Date/Time: 7/3/2018 10:45 AM  Performed by: [employer] Paredes  Authorized by: Danielito Robb     Procedure details: cystoscopy    Patient tolerance: Patient tolerated the procedure well with no immediate complications    Additional Procedure Details: Cystoscopy Procedure Note        Pre-operative Diagnosis:  Bladder cancer    Post-operative Diagnosis:  No evidence of recurrence      Procedure Details   The risks, benefits, complications, treatment options, and expected outcomes were discussed with the patient  The patient concurred with the proposed plan, giving informed consent  Cystoscopy was performed today under local anesthesia, using sterile technique  The patient was placed in the supine position, prepped and draped in the usual sterile fashion  A 15 Malaysian flexible cystoscope  was used to inspect both the urethra and bladder  Findings:  Normal urethra, 30 g obstructing prostate with elevated median bar, ureteral orifices are normal   The bladder is moderately trabeculated  There are no stones, tumors or diverticula             Specimens:  None                          Discussion:  See progress notes

## 2018-07-03 NOTE — PATIENT INSTRUCTIONS
Benign Prostatic Hypertrophy   WHAT YOU NEED TO KNOW:   Benign prostatic hypertrophy (BPH) is a condition that causes your prostate gland to grow larger than normal  The prostate gland is the male sex gland that produces a fluid that is part of semen  It is about the size of a walnut and it is located under the bladder  As the prostate grows, it can squeeze the urethra  This can block urine flow and cause urinary problems  DISCHARGE INSTRUCTIONS:   Medicines:   · Alpha blockers: This medicine relaxes the muscles in your prostate and bladder  It may help you urinate more easily  · 5 alpha reductase inhibitors: These medicines block the production of a hormone that causes the prostate to get larger  It may help slow the growth of the prostate or shrink the prostate  · Take your medicine as directed  Contact your healthcare provider if you think your medicine is not helping or if you have side effects  Tell him or her if you are allergic to any medicine  Keep a list of the medicines, vitamins, and herbs you take  Include the amounts, and when and why you take them  Bring the list or the pill bottles to follow-up visits  Carry your medicine list with you in case of an emergency  Follow up with your healthcare provider as directed:  Write down your questions so you remember to ask them during your visits  Manage BPH:   · Do not let your bladder get too full before you empty it  Urinate when you feel the urge  · Limit alcohol  Do not drink large amounts of any liquid at one time  · Decrease the amount of salt you eat  Examples of salty foods are chips, cured meats, and canned soups  Do not use table salt  · Healthcare providers may tell you not to eat spicy foods such as chilli peppers  This may help you find out if spicy food makes your BPH symptoms worse  · You may have sex if you feel well  Contact your healthcare provider if:   · There is a large amount of blood in your urine  · Your signs and symptoms get worse  · You have a fever  · You have questions or concerns about your condition or care  Seek care immediately if:   · You are unable to urinate  · Your bladder feels very full and painful  © 2017 [address] [employer] [employer] Information is for End User's use only and may not be sold, redistributed or otherwise used for commercial purposes  All illustrations and images included in CareNotes® are the copyrighted property of A D A M , Inc  or [employer] Alber[employer]heed  The above information is an  only  It is not intended as medical advice for individual conditions or treatments  Talk to your doctor, nurse or pharmacist before following any medical regimen to see if it is safe and effective for you  Cystoscopy   WHAT YOU NEED TO KNOW:   A cystoscopy is a procedure to look inside of your urethra and bladder using a cystoscope  A cystoscope is a small tube with a light and magnifying camera on the end  The procedure is used to diagnose and treat conditions of the bladder, urethra, and prostate  The procedure is also done to remove stones or blood clots from the urethra or bladder  Your healthcare provider may do other tests, such as ureteroscopy, during a cystoscopy  DISCHARGE INSTRUCTIONS:   Call 911 if:   · You suddenly have chest pain or trouble breathing  Seek care immediately if:   · Your urine turns from pink to red, or you have clots in your urine  · You cannot urinate and your bladder feels full  · Your pain or burning becomes worse or lasts longer than 2 days  Contact your healthcare provider or urologist if:   · Your urine stays pink for longer than 3 days  · You urinate less than normal, or still feel like you have to urinate after you use the bathroom  · Your skin is itchy, swollen, or has a new rash  · You have a fever and chills  · You have questions or concerns about your condition or care  Medicines:   You may  be given any of the following:  · Antibiotics  help treat or prevent a bacterial infection  · Acetaminophen  decreases pain and fever  It is available without a doctor's order  Ask how much to take and how often to take it  Follow directions  Read the labels of all other medicines you are using to see if they also contain acetaminophen, or ask your doctor or pharmacist  Acetaminophen can cause liver damage if not taken correctly  Do not use more than 4 grams (4,000 milligrams) total of acetaminophen in one day  · Take your medicine as directed  Contact your healthcare provider if you think your medicine is not helping or if you have side effects  Tell him or her if you are allergic to any medicine  Keep a list of the medicines, vitamins, and herbs you take  Include the amounts, and when and why you take them  Bring the list or the pill bottles to follow-up visits  Carry your medicine list with you in case of an emergency  Follow up with your healthcare provider as directed: You may need to have another cystoscopy  Write down your questions so you remember to ask them during your visits  Self-care:   · Drink at least 3 to 4 glasses of water daily for 2 days after your procedure  Do not drink acidic juices such as orange juice and lemonade  Drink water to help prevent blood clots from forming  It can also help decrease the amount of acid in your urine  Acid in your urine may increase the burning feeling when you urinate  · Sit in a warm tub of water  Warm water may relieve pain and bladder spasms  · Do not have sex  until your healthcare provider tells you it is okay  Sex may increase your risk for a urinary tract infection  © 2017 [facility] Information is for End User's use only and may not be sold, redistributed or otherwise used for commercial purposes  All illustrations and images included in CareNotes® are the copyrighted property of A D A M , Inc  or [employer] Alber[employer]heed    The above information is an  only  It is not intended as medical advice for individual conditions or treatments  Talk to your doctor, nurse or pharmacist before following any medical regimen to see if it is safe and effective for you

## 2018-07-03 NOTE — ASSESSMENT & PLAN NOTE
No evidence of recurrence on cystoscopy  Urinalysis is negative  Upper tracts were normal on CT scan December 2015  FISH  test was negative in 2016  We will continue yearly cystoscopy and consider repeat upper tract testing next year

## 2018-07-06 DIAGNOSIS — I10 ESSENTIAL HYPERTENSION: Primary | ICD-10-CM

## 2018-07-06 RX ORDER — LOSARTAN POTASSIUM 50 MG/1
TABLET ORAL
Qty: 90 TABLET | Refills: 0 | Status: SHIPPED | OUTPATIENT
Start: 2018-07-06 | End: 2018-10-08 | Stop reason: SDUPTHER

## 2018-07-09 ENCOUNTER — APPOINTMENT (OUTPATIENT)
Dept: LAB | Facility: MEDICAL CENTER | Age: 68
End: 2018-07-09
Payer: MEDICARE

## 2018-07-09 DIAGNOSIS — N13.8 BENIGN PROSTATIC HYPERPLASIA WITH URINARY OBSTRUCTION: ICD-10-CM

## 2018-07-09 DIAGNOSIS — N40.1 BENIGN PROSTATIC HYPERPLASIA WITH URINARY OBSTRUCTION: ICD-10-CM

## 2018-07-09 DIAGNOSIS — R73.9 HYPERGLYCEMIA: ICD-10-CM

## 2018-07-09 DIAGNOSIS — E03.9 ACQUIRED HYPOTHYROIDISM: ICD-10-CM

## 2018-07-09 DIAGNOSIS — I10 ESSENTIAL HYPERTENSION: ICD-10-CM

## 2018-07-09 LAB
ALBUMIN SERPL BCP-MCNC: 3.9 G/DL (ref 3.5–5)
ALP SERPL-CCNC: 61 U/L (ref 46–116)
ALT SERPL W P-5'-P-CCNC: 18 U/L (ref 12–78)
ANION GAP SERPL CALCULATED.3IONS-SCNC: 3 MMOL/L (ref 4–13)
AST SERPL W P-5'-P-CCNC: 24 U/L (ref 5–45)
BILIRUB SERPL-MCNC: 0.52 MG/DL (ref 0.2–1)
BUN SERPL-MCNC: 18 MG/DL (ref 5–25)
CALCIUM SERPL-MCNC: 8.6 MG/DL (ref 8.3–10.1)
CHLORIDE SERPL-SCNC: 105 MMOL/L (ref 100–108)
CHOLEST SERPL-MCNC: 187 MG/DL (ref 50–200)
CO2 SERPL-SCNC: 29 MMOL/L (ref 21–32)
CREAT SERPL-MCNC: 1.07 MG/DL (ref 0.6–1.3)
EST. AVERAGE GLUCOSE BLD GHB EST-MCNC: 120 MG/DL
GFR SERPL CREATININE-BSD FRML MDRD: 71 ML/MIN/1.73SQ M
GLUCOSE P FAST SERPL-MCNC: 95 MG/DL (ref 65–99)
HBA1C MFR BLD: 5.8 % (ref 4.2–6.3)
HDLC SERPL-MCNC: 55 MG/DL (ref 40–60)
LDLC SERPL CALC-MCNC: 117 MG/DL (ref 0–100)
POTASSIUM SERPL-SCNC: 4.3 MMOL/L (ref 3.5–5.3)
PROT SERPL-MCNC: 7.6 G/DL (ref 6.4–8.2)
PSA SERPL-MCNC: 2.6 NG/ML (ref 0–4)
SODIUM SERPL-SCNC: 137 MMOL/L (ref 136–145)
TRIGL SERPL-MCNC: 76 MG/DL

## 2018-07-09 PROCEDURE — 84153 ASSAY OF PSA TOTAL: CPT

## 2018-07-09 PROCEDURE — 80053 COMPREHEN METABOLIC PANEL: CPT

## 2018-07-09 PROCEDURE — 80061 LIPID PANEL: CPT

## 2018-07-09 PROCEDURE — 36415 COLL VENOUS BLD VENIPUNCTURE: CPT

## 2018-07-09 PROCEDURE — 83036 HEMOGLOBIN GLYCOSYLATED A1C: CPT

## 2018-07-11 PROBLEM — R07.89 CHEST TIGHTNESS OR PRESSURE: Status: RESOLVED | Noted: 2018-07-11 | Resolved: 2018-07-11

## 2018-07-11 PROBLEM — M17.9 OSTEOARTHRITIS OF KNEE: Status: ACTIVE | Noted: 2018-07-11

## 2018-07-11 PROBLEM — M17.10 OSTEOARTHRITIS OF KNEE: Status: ACTIVE | Noted: 2018-07-11

## 2018-07-11 PROBLEM — R07.89 CHEST TIGHTNESS OR PRESSURE: Status: ACTIVE | Noted: 2018-07-11

## 2018-07-12 ENCOUNTER — OFFICE VISIT (OUTPATIENT)
Dept: FAMILY MEDICINE CLINIC | Facility: CLINIC | Age: 68
End: 2018-07-12
Payer: MEDICARE

## 2018-07-12 VITALS
WEIGHT: 173.2 LBS | BODY MASS INDEX: 27.13 KG/M2 | HEART RATE: 76 BPM | SYSTOLIC BLOOD PRESSURE: 124 MMHG | DIASTOLIC BLOOD PRESSURE: 76 MMHG

## 2018-07-12 DIAGNOSIS — E03.9 ACQUIRED HYPOTHYROIDISM: ICD-10-CM

## 2018-07-12 DIAGNOSIS — Z23 NEED FOR PNEUMOCOCCAL VACCINATION: Primary | ICD-10-CM

## 2018-07-12 DIAGNOSIS — I10 ESSENTIAL HYPERTENSION: ICD-10-CM

## 2018-07-12 DIAGNOSIS — R07.89 CHEST TIGHTNESS OR PRESSURE: ICD-10-CM

## 2018-07-12 DIAGNOSIS — R73.9 HYPERGLYCEMIA: ICD-10-CM

## 2018-07-12 PROCEDURE — 99213 OFFICE O/P EST LOW 20 MIN: CPT | Performed by: PHYSICIAN ASSISTANT

## 2018-07-12 PROCEDURE — 90670 PCV13 VACCINE IM: CPT

## 2018-07-12 PROCEDURE — G0009 ADMIN PNEUMOCOCCAL VACCINE: HCPCS

## 2018-07-12 RX ORDER — ALPRAZOLAM 0.25 MG/1
TABLET ORAL
COMMUNITY

## 2018-07-12 NOTE — PATIENT INSTRUCTIONS
Problem List Items Addressed This Visit        Endocrine    Acquired hypothyroidism     TSH within normal limits continue current supplementation  Relevant Orders    TSH, 3rd generation with Free T4 reflex       Cardiovascular and Mediastinum    Essential hypertension     Stable continue current medication  Recheck 1 year  Relevant Orders    Lipid Panel with Direct LDL reflex       Other    Hyperglycemia     Very stable with a fasting sugar of 95 an A1c of 5 8           Relevant Orders    Lipid Panel with Direct LDL reflex    Hemoglobin A1C    Comprehensive metabolic panel    RESOLVED: Chest tightness or pressure - Primary

## 2018-07-12 NOTE — PROGRESS NOTES
Assessment/Plan:    Acquired hypothyroidism  TSH within normal limits continue current supplementation  Hyperglycemia  Very stable with a fasting sugar of 95 an A1c of 5 8  Essential hypertension  Stable continue current medication  Recheck 1 year  Diagnoses and all orders for this visit:    Need for pneumococcal vaccination  -     PNEUMOCOCCAL CONJUGATE VACCINE 13-VALENT GREATER THAN 6 MONTHS    Chest tightness or pressure    Acquired hypothyroidism  -     TSH, 3rd generation with Free T4 reflex; Future    Essential hypertension  -     Lipid Panel with Direct LDL reflex; Future    Hyperglycemia  -     Lipid Panel with Direct LDL reflex; Future  -     Hemoglobin A1C; Future  -     Comprehensive metabolic panel; Future    Other orders  -     ALPRAZolam (XANAX) 0 25 mg tablet; alprazolam 0 25 mg tablet          Subjective: Follow up for chronic conditions and to review blood work results  aurelia     Patient ID: Fatemeh Aragon is a 79 y o  male  Fatemeh Aragon is here for chronic conditions f/u including the diagnosis of Chest tightness or pressure  (primary encounter diagnosis)  Acquired hypothyroidism  Essential hypertension  Hyperglycemia   Pt  states they are taking all medications as directed without complaints or side effects   Pt  had labs done prior to today's visit which included Recent Results (from the past 672 hour(s))  -TSH baseline  Collection Time: 06/29/18  3:52 PM       Result                      Value             Ref Range           TSH Baseline                5 710             uIU/mL         -PSA Total, Diagnostic  Collection Time: 06/29/18  3:52 PM       Result                      Value             Ref Range           PSA                         2 4               0 0 - 4 0 ng*  -POCT urine dip auto non-scope  Collection Time: 07/03/18 10:15 AM       Result                      Value             Ref Range            COLOR,UA                   yellow CLARITY,UA                 clear                                  SPECIFIC GRAVITY,UA        1 025                                  PH,UA                      5 5                                   LEUKOCYTE ESTERASE,UA       neg                                    NITRITE,UA                 neg                                   GLUCOSE, UA                 neg                                    KETONES,UA                 neg                                    BILIRUBIN,UA               neg                                    BLOOD,UA                   neg                                   SL AMB POCT URINE PROT*     neg                                   SL AMB POCT UROBILINOG*     0 2                              -Lipid Panel with Direct LDL reflex  Collection Time: 07/09/18 11:00 AM       Result                      Value             Ref Range           Cholesterol                 187               50 - 200 mg/*       Triglycerides               76                <=150 mg/dL         HDL, Direct                 55                40 - 60 mg/dL       LDL Calculated              117 (H)           0 - 100 mg/dL  -Comprehensive metabolic panel  Collection Time: 07/09/18 11:00 AM       Result                      Value             Ref Range           Sodium                      137               136 - 145 mm*       Potassium                   4 3               3 5 - 5 3 mm*       Chloride                    105               100 - 108 mm*       CO2                         29                21 - 32 mmol*       Anion Gap                   3 (L)             4 - 13 mmol/L       BUN                         18                5 - 25 mg/dL        Creatinine                  1 07              0 60 - 1 30 *       Glucose, Fasting            95                65 - 99 mg/dL       Calcium                     8 6               8 3 - 10 1 m*       AST                         24                5 - 45 U/L          ALT 18                12 - 78 U/L         Alkaline Phosphatase        61                46 - 116 U/L        Total Protein               7 6               6 4 - 8 2 g/*       Albumin                     3 9               3 5 - 5 0 g/*       Total Bilirubin             0 52              0 20 - 1 00 *       eGFR                        71                ml/min/1 73s*  -Hemoglobin A1C  Collection Time: 07/09/18 11:00 AM       Result                      Value             Ref Range           Hemoglobin A1C              5 8               4 2 - 6 3 %         EAG                         120               mg/dl          -PSA Total, Diagnostic  Collection Time: 07/09/18 11:00 AM       Result                      Value             Ref Range           PSA                         2 6               0 0 - 4 0 ng*          The following portions of the patient's history were reviewed and updated as appropriate: allergies, current medications, past family history, past medical history, past social history, past surgical history and problem list     Review of Systems   Constitutional: Negative  HENT: Negative  Eyes: Negative  Respiratory: Negative  Cardiovascular: Negative  Gastrointestinal: Negative  Endocrine: Negative  Genitourinary: Negative  Musculoskeletal: Negative  Skin: Negative  Allergic/Immunologic: Negative  Neurological: Negative  Hematological: Negative  Psychiatric/Behavioral: Negative  Objective:      Vitals:    07/12/18 1009   BP: 124/76   BP Location: Left arm   Patient Position: Sitting   Pulse: 76   Weight: 78 6 kg (173 lb 3 2 oz)            Physical Exam   Constitutional: He is oriented to person, place, and time  He appears well-developed and well-nourished  No distress  HENT:   Head: Normocephalic and atraumatic  Eyes: Conjunctivae are normal  Right eye exhibits no discharge  Left eye exhibits no discharge  Neck: Carotid bruit is not present     Cardiovascular: Normal rate, regular rhythm and normal heart sounds  Exam reveals no gallop and no friction rub  No murmur heard  Pulmonary/Chest: Effort normal and breath sounds normal  No respiratory distress  He has no wheezes  He has no rales  Neurological: He is alert and oriented to person, place, and time  Skin: Skin is warm and dry  He is not diaphoretic  Psychiatric: He has a normal mood and affect  Judgment normal    Nursing note and vitals reviewed

## 2018-08-04 DIAGNOSIS — E03.9 ACQUIRED HYPOTHYROIDISM: ICD-10-CM

## 2018-08-04 RX ORDER — LEVOTHYROXINE SODIUM 112 UG/1
TABLET ORAL
Qty: 90 TABLET | Refills: 1 | Status: SHIPPED | OUTPATIENT
Start: 2018-08-04 | End: 2019-04-08 | Stop reason: SDUPTHER

## 2018-10-08 DIAGNOSIS — I10 ESSENTIAL HYPERTENSION: ICD-10-CM

## 2018-10-08 RX ORDER — LOSARTAN POTASSIUM 50 MG/1
TABLET ORAL
Qty: 90 TABLET | Refills: 0 | Status: SHIPPED | OUTPATIENT
Start: 2018-10-08 | End: 2019-01-14 | Stop reason: SDUPTHER

## 2018-12-05 ENCOUNTER — OFFICE VISIT (OUTPATIENT)
Dept: FAMILY MEDICINE CLINIC | Facility: CLINIC | Age: 68
End: 2018-12-05
Payer: MEDICARE

## 2018-12-05 VITALS
BODY MASS INDEX: 26.61 KG/M2 | DIASTOLIC BLOOD PRESSURE: 60 MMHG | SYSTOLIC BLOOD PRESSURE: 120 MMHG | TEMPERATURE: 98.1 F | HEART RATE: 72 BPM | HEIGHT: 68 IN | WEIGHT: 175.6 LBS

## 2018-12-05 DIAGNOSIS — J06.9 ACUTE UPPER RESPIRATORY INFECTION: Primary | ICD-10-CM

## 2018-12-05 PROCEDURE — 99214 OFFICE O/P EST MOD 30 MIN: CPT | Performed by: PHYSICIAN ASSISTANT

## 2018-12-05 RX ORDER — CEFUROXIME AXETIL 250 MG/1
250 TABLET ORAL EVERY 12 HOURS SCHEDULED
Qty: 20 TABLET | Refills: 0 | Status: SHIPPED | OUTPATIENT
Start: 2018-12-05 | End: 2018-12-15

## 2018-12-05 NOTE — PROGRESS NOTES
Assessment/Plan:    Acute upper respiratory infection  Recommend Coricidin HBP as directed generic brand, increase fluids  Patient was given a paper prescription for antibiotic to fill if his mucus is colored and or his symptoms worsen despite Coricidin use  Diagnoses and all orders for this visit:    Acute upper respiratory infection  -     cefuroxime (CEFTIN) 250 mg tablet; Take 1 tablet (250 mg total) by mouth every 12 (twelve) hours for 10 days          Subjective:   CC: sore throat and cough x 5-6 days  Pt  States the sore throat has been reoccurring over the last several months   [facility]     Patient ID: Jose Pendleton is a 76 y o  male  Patient here today because for the past few months he has been having intermittent sore throats however now he has had a sore throat with copious postnasal drip loss of voice and a productive cough with chest discomfort for about 1 week now  He states he has not tried anything over-the-counter  No fevers  No one else he knows is sick  He may possibly have allergies but unsure  He has not been looking at his mucus color and is getting no runny nose  No nausea vomiting or diarrhea  The following portions of the patient's history were reviewed and updated as appropriate: allergies, current medications, past family history, past medical history, past social history, past surgical history and problem list     Review of Systems   Constitutional: Negative  HENT: Positive for congestion, postnasal drip, sinus pressure, sore throat and voice change  Negative for rhinorrhea  Eyes: Negative  Respiratory: Positive for cough and chest tightness  Cardiovascular: Negative  Gastrointestinal: Negative  Endocrine: Negative  Genitourinary: Negative  Musculoskeletal: Negative  Skin: Negative  Allergic/Immunologic: Negative  Neurological: Negative  Hematological: Negative  Psychiatric/Behavioral: Negative            Objective:      Vitals: 12/05/18 1212   BP: 120/60   BP Location: Left arm   Patient Position: Sitting   Pulse: 72   Temp: 98 1 °F (36 7 °C)   TempSrc: Tympanic   Weight: 79 7 kg (175 lb 9 6 oz)   Height: 5' 8" (1 727 m)            Physical Exam   Constitutional: He is oriented to person, place, and time  He appears well-developed and well-nourished  No distress  HENT:   Head: Normocephalic and atraumatic  Right Ear: Hearing, tympanic membrane, external ear and ear canal normal    Left Ear: Hearing, tympanic membrane, external ear and ear canal normal    Nose: Nose normal    Mouth/Throat: Posterior oropharyngeal edema and posterior oropharyngeal erythema present  Eyes: Conjunctivae are normal  Right eye exhibits no discharge  Left eye exhibits no discharge  Neck: Carotid bruit is not present  Cardiovascular: Normal rate, regular rhythm and normal heart sounds  Exam reveals no gallop and no friction rub  No murmur heard  Pulmonary/Chest: Effort normal and breath sounds normal  No respiratory distress  He has no wheezes  He has no rales  Lymphadenopathy:     He has no cervical adenopathy  Neurological: He is alert and oriented to person, place, and time  Skin: Skin is warm and dry  He is not diaphoretic  Psychiatric: He has a normal mood and affect  Judgment normal    Nursing note and vitals reviewed

## 2018-12-05 NOTE — PATIENT INSTRUCTIONS
Problem List Items Addressed This Visit        Respiratory    Acute upper respiratory infection - Primary     Recommend Coricidin HBP as directed generic brand, increase fluids  Patient was given a paper prescription for antibiotic to fill if his mucus is colored and or his symptoms worsen despite Coricidin use           Relevant Medications    cefuroxime (CEFTIN) 250 mg tablet

## 2019-01-14 DIAGNOSIS — I10 ESSENTIAL HYPERTENSION: ICD-10-CM

## 2019-01-15 RX ORDER — LOSARTAN POTASSIUM 50 MG/1
TABLET ORAL
Qty: 90 TABLET | Refills: 0 | Status: SHIPPED | OUTPATIENT
Start: 2019-01-15 | End: 2019-05-19 | Stop reason: SDUPTHER

## 2019-04-08 DIAGNOSIS — E03.9 ACQUIRED HYPOTHYROIDISM: ICD-10-CM

## 2019-04-08 RX ORDER — LEVOTHYROXINE SODIUM 112 UG/1
TABLET ORAL
Qty: 90 TABLET | Refills: 0 | Status: SHIPPED | OUTPATIENT
Start: 2019-04-08 | End: 2019-08-10 | Stop reason: SDUPTHER

## 2019-05-19 DIAGNOSIS — I10 ESSENTIAL HYPERTENSION: ICD-10-CM

## 2019-05-20 RX ORDER — LOSARTAN POTASSIUM 50 MG/1
TABLET ORAL
Qty: 90 TABLET | Refills: 0 | Status: SHIPPED | OUTPATIENT
Start: 2019-05-20 | End: 2019-09-02 | Stop reason: SDUPTHER

## 2019-07-08 ENCOUNTER — PROCEDURE VISIT (OUTPATIENT)
Dept: UROLOGY | Facility: MEDICAL CENTER | Age: 69
End: 2019-07-08
Payer: MEDICARE

## 2019-07-08 VITALS
WEIGHT: 184 LBS | HEART RATE: 56 BPM | BODY MASS INDEX: 27.89 KG/M2 | SYSTOLIC BLOOD PRESSURE: 138 MMHG | DIASTOLIC BLOOD PRESSURE: 82 MMHG | HEIGHT: 68 IN

## 2019-07-08 DIAGNOSIS — N40.1 BENIGN PROSTATIC HYPERPLASIA WITH URINARY OBSTRUCTION: ICD-10-CM

## 2019-07-08 DIAGNOSIS — Z85.51 PERSONAL HISTORY OF BLADDER CANCER: Primary | ICD-10-CM

## 2019-07-08 DIAGNOSIS — N13.8 BENIGN PROSTATIC HYPERPLASIA WITH URINARY OBSTRUCTION: ICD-10-CM

## 2019-07-08 LAB
SL AMB  POCT GLUCOSE, UA: ABNORMAL
SL AMB LEUKOCYTE ESTERASE,UA: ABNORMAL
SL AMB POCT BILIRUBIN,UA: ABNORMAL
SL AMB POCT BLOOD,UA: ABNORMAL
SL AMB POCT CLARITY,UA: CLEAR
SL AMB POCT COLOR,UA: YELLOW
SL AMB POCT KETONES,UA: ABNORMAL
SL AMB POCT NITRITE,UA: ABNORMAL
SL AMB POCT PH,UA: 5.5
SL AMB POCT SPECIFIC GRAVITY,UA: 1.03
SL AMB POCT URINE PROTEIN: ABNORMAL
SL AMB POCT UROBILINOGEN: 0.2

## 2019-07-08 PROCEDURE — 81003 URINALYSIS AUTO W/O SCOPE: CPT | Performed by: UROLOGY

## 2019-07-08 PROCEDURE — 99214 OFFICE O/P EST MOD 30 MIN: CPT | Performed by: UROLOGY

## 2019-07-08 PROCEDURE — 52000 CYSTOURETHROSCOPY: CPT | Performed by: UROLOGY

## 2019-07-08 RX ORDER — BUPROPION HYDROCHLORIDE 150 MG/1
150 TABLET, EXTENDED RELEASE ORAL 2 TIMES DAILY
Refills: 0 | COMMUNITY
Start: 2019-04-09

## 2019-07-08 NOTE — ASSESSMENT & PLAN NOTE
AUA symptom score is 24  He is mixed about his voiding pattern PSA last year was 2 4  We will plan to repeat his PSA at this time  I again discussed urolift with the patient  And informational brochure was given  Cystoscopically he is a good candidate  He does not wish to take medication  He will consider this option and call should he wish to proceed

## 2019-07-08 NOTE — PATIENT INSTRUCTIONS

## 2019-07-08 NOTE — PROGRESS NOTES
Assessment/Plan:    Benign prostatic hyperplasia with urinary obstruction  AUA symptom score is 24  He is mixed about his voiding pattern PSA last year was 2 4  We will plan to repeat his PSA at this time  I again discussed urolift with the patient  And informational brochure was given  Cystoscopically he is a good candidate  He does not wish to take medication  He will consider this option and call should he wish to proceed  Bladder cancer ([facility] [address] )  There is no evidence of recurrence based on cystoscopy  Urinalysis is negative for blood  Upper tracts were negative in 2015 and a fish test negative in 2016  We will plan to screen the upper tracts with a renal ultrasound  He will return for repeat cystoscopy next year  Diagnoses and all orders for this visit:    Personal history of bladder cancer  -     POCT urine dip auto non-scope  -     Cystoscopy  -     US retroperitoneal complete; Future  -     Cystoscopy; Future    Benign prostatic hyperplasia with urinary obstruction  -     PSA Total, Diagnostic; Future    Other orders  -     buPROPion (WELLBUTRIN SR) 150 mg 12 hr tablet; Take 150 mg by mouth 2 (two) times a day          Subjective:      Patient ID: Santo Talley is a 76 y o  male  Benign Prostatic Hypertrophy   This is a chronic problem  The current episode started more than 1 year ago  The problem has been gradually worsening since onset  Irritative symptoms include nocturia  Irritative symptoms do not include frequency or urgency  Obstructive symptoms include incomplete emptying, an intermittent stream and a slower stream  Associated symptoms include hesitancy  Pertinent negatives include no chills, dysuria, genital pain, hematuria, nausea or vomiting  AUA score is 20-35  His sexual activity is non-contributory to the current illness  The symptoms are aggravated by caffeine  Past treatments include nothing       Personal history of  bladder cancer- the patient has a remote history of bladder cancer  There has been no gross hematuria, dysuria or symptoms of infection  He has no flank pain  His last upper tract evaluation was in 2015  The following portions of the patient's history were reviewed and updated as appropriate: allergies, current medications, past family history, past medical history, past social history, past surgical history and problem list     Review of Systems   Constitutional: Negative for chills, diaphoresis, fatigue and fever  HENT: Negative  Eyes: Negative  Respiratory: Negative  Cardiovascular: Negative  Gastrointestinal: Negative  Negative for nausea and vomiting  Endocrine: Negative  Genitourinary: Positive for hesitancy, incomplete emptying and nocturia  Negative for dysuria, frequency, hematuria and urgency  See HPI   Musculoskeletal: Negative  Skin: Negative  Allergic/Immunologic: Negative  Neurological: Negative  Hematological: Negative  Psychiatric/Behavioral: Negative  AUA SYMPTOM SCORE      Most Recent Value   AUA SYMPTOM SCORE   How often have you had a sensation of not emptying your bladder completely after you finished urinating? 3   How often have you had to urinate again less than two hours after you finished urinating? 3   How often have you found you stopped and started again several times when you urinate? 4   How often have you found it difficult to postpone urination? 2   How often have you had a weak urinary stream?  4   How often have you had to push or strain to begin urination? 5   How many times did you most typically get up to urinate from the time you went to bed at night until the time you got up in the morning?   3   Quality of Life: If you were to spend the rest of your life with your urinary condition just the way it is now, how would you feel about that?  3   AUA SYMPTOM SCORE  24      Objective:      /82 (BP Location: Left arm, Patient Position: Sitting, Cuff Size: Adult) Pulse 56   Ht 5' 8" (1 727 m)   Wt 83 5 kg (184 lb)   BMI 27 98 kg/m²          Physical Exam   Constitutional: He is oriented to person, place, and time  He appears well-developed and well-nourished  HENT:   Head: Normocephalic and atraumatic  Eyes: Conjunctivae are normal    Neck: Neck supple  Cardiovascular: Normal rate  Pulmonary/Chest: Effort normal    Abdominal: Soft  Bowel sounds are normal  He exhibits no distension and no mass  There is no tenderness  There is no rebound, no guarding and no CVA tenderness  No hernia  Genitourinary: Rectum normal, testes normal and penis normal  Right testis shows no mass  Left testis shows no mass  No phimosis or hypospadias  Genitourinary Comments: Prostate 2 times enlarged and palpably benign  Musculoskeletal: He exhibits no edema  Neurological: He is alert and oriented to person, place, and time  Skin: Skin is warm and dry  Psychiatric: He has a normal mood and affect  His behavior is normal  Judgment and thought content normal            Cystoscopy  Date/Time: 7/8/2019 11:13 AM  Performed by: Sahara Escalante MD  Authorized by: Sahara Escalante MD     Procedure details: cystoscopy    Patient tolerance: Patient tolerated the procedure well with no immediate complications    Additional Procedure Details: Cystoscopy Procedure Note        Pre-operative Diagnosis:  Personal history of bladder cancer    Post-operative Diagnosis:  No evidence of recurrence      Procedure Details   The risks, benefits, complications, treatment options, and expected outcomes were discussed with the patient  The patient concurred with the proposed plan, giving informed consent  Cystoscopy was performed today under local anesthesia, using sterile technique  The patient was placed in the supine position, prepped and draped in the usual sterile fashion  A 15 Croatian flexible cystoscope  was used to inspect both the urethra and bladder    Findings:  Normal urethra, predominantly bilobar prostatic hypertrophy approximately 30 g causing complete outflow obstruction  No intravesical median lobe  Ureteral orifices are normal   Bladder is moderately trabeculated  There is a wide-mouth diverticula at the dome of the bladder  Scar from previous resection is evident  There is no evidence of recurrent bladder cancer

## 2019-07-08 NOTE — LETTER
July 8, 2019     [address] [place] [address] [place]    Patient: Delma Fish   YOB: 1950   Date of Visit: 7/8/2019       Dear Dr Mona Ackerman: Thank you for referring Emory Donohue to me for evaluation  Below are my notes for this consultation  If you have questions, please do not hesitate to call me  I look forward to following your patient along with you  Sincerely,        Eder Bermudez MD        CC: No Recipients  Eder Bermudez MD  7/8/2019 11:32 AM  Sign at close encounter  Assessment/Plan:    Benign prostatic hyperplasia with urinary obstruction  AUA symptom score is 24  He is mixed about his voiding pattern PSA last year was 2 4  We will plan to repeat his PSA at this time  I again discussed urolift with the patient  And informational brochure was given  Cystoscopically he is a good candidate  He does not wish to take medication  He will consider this option and call should he wish to proceed  Bladder cancer ([facility] [address] )  There is no evidence of recurrence based on cystoscopy  Urinalysis is negative for blood  Upper tracts were negative in 2015 and a fish test negative in 2016  We will plan to screen the upper tracts with a renal ultrasound  He will return for repeat cystoscopy next year  Diagnoses and all orders for this visit:    Personal history of bladder cancer  -     POCT urine dip auto non-scope  -     Cystoscopy  -     US retroperitoneal complete; Future  -     Cystoscopy; Future    Benign prostatic hyperplasia with urinary obstruction  -     PSA Total, Diagnostic; Future    Other orders  -     buPROPion (WELLBUTRIN SR) 150 mg 12 hr tablet; Take 150 mg by mouth 2 (two) times a day          Subjective:      Patient ID: Delma Fish is a 76 y o  male  Benign Prostatic Hypertrophy   This is a chronic problem  The current episode started more than 1 year ago  The problem has been gradually worsening since onset   Irritative symptoms include nocturia  Irritative symptoms do not include frequency or urgency  Obstructive symptoms include incomplete emptying, an intermittent stream and a slower stream  Associated symptoms include hesitancy  Pertinent negatives include no chills, dysuria, genital pain, hematuria, nausea or vomiting  AUA score is 20-35  His sexual activity is non-contributory to the current illness  The symptoms are aggravated by caffeine  Past treatments include nothing  Personal history of  bladder cancer- the patient has a remote history of bladder cancer  There has been no gross hematuria, dysuria or symptoms of infection  He has no flank pain  His last upper tract evaluation was in 2015  The following portions of the patient's history were reviewed and updated as appropriate: allergies, current medications, past family history, past medical history, past social history, past surgical history and problem list     Review of Systems   Constitutional: Negative for chills, diaphoresis, fatigue and fever  HENT: Negative  Eyes: Negative  Respiratory: Negative  Cardiovascular: Negative  Gastrointestinal: Negative  Negative for nausea and vomiting  Endocrine: Negative  Genitourinary: Positive for hesitancy, incomplete emptying and nocturia  Negative for dysuria, frequency, hematuria and urgency  See HPI   Musculoskeletal: Negative  Skin: Negative  Allergic/Immunologic: Negative  Neurological: Negative  Hematological: Negative  Psychiatric/Behavioral: Negative  AUA SYMPTOM SCORE      Most Recent Value   AUA SYMPTOM SCORE   How often have you had a sensation of not emptying your bladder completely after you finished urinating? 3   How often have you had to urinate again less than two hours after you finished urinating? 3   How often have you found you stopped and started again several times when you urinate? 4   How often have you found it difficult to postpone urination?   2 How often have you had a weak urinary stream?  4   How often have you had to push or strain to begin urination? 5   How many times did you most typically get up to urinate from the time you went to bed at night until the time you got up in the morning? 3   Quality of Life: If you were to spend the rest of your life with your urinary condition just the way it is now, how would you feel about that?  3   AUA SYMPTOM SCORE  24      Objective:      /82 (BP Location: Left arm, Patient Position: Sitting, Cuff Size: Adult)   Pulse 56   Ht 5' 8" (1 727 m)   Wt 83 5 kg (184 lb)   BMI 27 98 kg/m²           Physical Exam   Constitutional: He is oriented to person, place, and time  He appears well-developed and well-nourished  HENT:   Head: Normocephalic and atraumatic  Eyes: Conjunctivae are normal    Neck: Neck supple  Cardiovascular: Normal rate  Pulmonary/Chest: Effort normal    Abdominal: Soft  Bowel sounds are normal  He exhibits no distension and no mass  There is no tenderness  There is no rebound, no guarding and no CVA tenderness  No hernia  Genitourinary: Rectum normal, testes normal and penis normal  Right testis shows no mass  Left testis shows no mass  No phimosis or hypospadias  Genitourinary Comments: Prostate 2 times enlarged and palpably benign  Musculoskeletal: He exhibits no edema  Neurological: He is alert and oriented to person, place, and time  Skin: Skin is warm and dry  Psychiatric: He has a normal mood and affect   His behavior is normal  Judgment and thought content normal            Cystoscopy  Date/Time: 7/8/2019 11:13 AM  Performed by: Chuy Correa MD  Authorized by: Chuy Correa MD     Procedure details: cystoscopy    Patient tolerance: Patient tolerated the procedure well with no immediate complications    Additional Procedure Details: Cystoscopy Procedure Note        Pre-operative Diagnosis:  Personal history of bladder cancer    Post-operative Diagnosis: No evidence of recurrence      Procedure Details   The risks, benefits, complications, treatment options, and expected outcomes were discussed with the patient  The patient concurred with the proposed plan, giving informed consent  Cystoscopy was performed today under local anesthesia, using sterile technique  The patient was placed in the supine position, prepped and draped in the usual sterile fashion  A 15 Bulgarian flexible cystoscope  was used to inspect both the urethra and bladder  Findings:  Normal urethra, predominantly bilobar prostatic hypertrophy approximately 30 g causing complete outflow obstruction  No intravesical median lobe  Ureteral orifices are normal   Bladder is moderately trabeculated  There is a wide-mouth diverticula at the dome of the bladder  Scar from previous resection is evident  There is no evidence of recurrent bladder cancer

## 2019-07-08 NOTE — ASSESSMENT & PLAN NOTE
There is no evidence of recurrence based on cystoscopy  Urinalysis is negative for blood  Upper tracts were negative in 2015 and a fish test negative in 2016  We will plan to screen the upper tracts with a renal ultrasound  He will return for repeat cystoscopy next year

## 2019-07-16 ENCOUNTER — HOSPITAL ENCOUNTER (OUTPATIENT)
Dept: ULTRASOUND IMAGING | Facility: HOSPITAL | Age: 69
Discharge: HOME | End: 2019-07-16
Attending: UROLOGY
Payer: MEDICARE

## 2019-07-16 DIAGNOSIS — Z85.51 PERSONAL HISTORY OF BLADDER CANCER: ICD-10-CM

## 2019-07-16 PROCEDURE — 76770 US EXAM ABDO BACK WALL COMP: CPT

## 2019-08-10 DIAGNOSIS — E03.9 ACQUIRED HYPOTHYROIDISM: ICD-10-CM

## 2019-08-12 RX ORDER — LEVOTHYROXINE SODIUM 112 UG/1
TABLET ORAL
Qty: 90 TABLET | Refills: 0 | Status: SHIPPED | OUTPATIENT
Start: 2019-08-12 | End: 2019-09-19 | Stop reason: SDUPTHER

## 2019-09-02 DIAGNOSIS — I10 ESSENTIAL HYPERTENSION: ICD-10-CM

## 2019-09-03 RX ORDER — LOSARTAN POTASSIUM 50 MG/1
TABLET ORAL
Qty: 90 TABLET | Refills: 0 | Status: SHIPPED | OUTPATIENT
Start: 2019-09-03 | End: 2019-12-04 | Stop reason: SDUPTHER

## 2019-09-13 ENCOUNTER — APPOINTMENT (OUTPATIENT)
Dept: LAB | Facility: CLINIC | Age: 69
End: 2019-09-13
Payer: MEDICARE

## 2019-09-13 DIAGNOSIS — N40.1 BENIGN PROSTATIC HYPERPLASIA WITH URINARY OBSTRUCTION: ICD-10-CM

## 2019-09-13 DIAGNOSIS — I10 ESSENTIAL HYPERTENSION: ICD-10-CM

## 2019-09-13 DIAGNOSIS — R73.9 HYPERGLYCEMIA: ICD-10-CM

## 2019-09-13 DIAGNOSIS — E03.9 ACQUIRED HYPOTHYROIDISM: ICD-10-CM

## 2019-09-13 DIAGNOSIS — N13.8 BENIGN PROSTATIC HYPERPLASIA WITH URINARY OBSTRUCTION: ICD-10-CM

## 2019-09-13 LAB
ALBUMIN SERPL BCP-MCNC: 4.2 G/DL (ref 3.5–5)
ALP SERPL-CCNC: 60 U/L (ref 46–116)
ALT SERPL W P-5'-P-CCNC: 23 U/L (ref 12–78)
ANION GAP SERPL CALCULATED.3IONS-SCNC: 4 MMOL/L (ref 4–13)
AST SERPL W P-5'-P-CCNC: 24 U/L (ref 5–45)
BILIRUB SERPL-MCNC: 0.74 MG/DL (ref 0.2–1)
BUN SERPL-MCNC: 20 MG/DL (ref 5–25)
CALCIUM SERPL-MCNC: 9.7 MG/DL (ref 8.3–10.1)
CHLORIDE SERPL-SCNC: 108 MMOL/L (ref 100–108)
CHOLEST SERPL-MCNC: 185 MG/DL (ref 50–200)
CO2 SERPL-SCNC: 28 MMOL/L (ref 21–32)
CREAT SERPL-MCNC: 1.08 MG/DL (ref 0.6–1.3)
EST. AVERAGE GLUCOSE BLD GHB EST-MCNC: 123 MG/DL
GFR SERPL CREATININE-BSD FRML MDRD: 70 ML/MIN/1.73SQ M
GLUCOSE P FAST SERPL-MCNC: 96 MG/DL (ref 65–99)
HBA1C MFR BLD: 5.9 % (ref 4.2–6.3)
HDLC SERPL-MCNC: 50 MG/DL (ref 40–60)
LDLC SERPL CALC-MCNC: 119 MG/DL (ref 0–100)
POTASSIUM SERPL-SCNC: 4.6 MMOL/L (ref 3.5–5.3)
PROT SERPL-MCNC: 7.5 G/DL (ref 6.4–8.2)
PSA SERPL-MCNC: 2.7 NG/ML (ref 0–4)
SODIUM SERPL-SCNC: 140 MMOL/L (ref 136–145)
T4 FREE SERPL-MCNC: 0.8 NG/DL (ref 0.76–1.46)
TRIGL SERPL-MCNC: 82 MG/DL
TSH SERPL DL<=0.05 MIU/L-ACNC: 12.9 UIU/ML (ref 0.36–3.74)

## 2019-09-13 PROCEDURE — 83036 HEMOGLOBIN GLYCOSYLATED A1C: CPT

## 2019-09-13 PROCEDURE — 36415 COLL VENOUS BLD VENIPUNCTURE: CPT

## 2019-09-13 PROCEDURE — 84439 ASSAY OF FREE THYROXINE: CPT

## 2019-09-13 PROCEDURE — 80061 LIPID PANEL: CPT

## 2019-09-13 PROCEDURE — 84443 ASSAY THYROID STIM HORMONE: CPT

## 2019-09-13 PROCEDURE — 80053 COMPREHEN METABOLIC PANEL: CPT

## 2019-09-13 PROCEDURE — 84153 ASSAY OF PSA TOTAL: CPT

## 2019-09-18 PROBLEM — J06.9 ACUTE UPPER RESPIRATORY INFECTION: Status: RESOLVED | Noted: 2018-12-05 | Resolved: 2019-09-18

## 2019-09-18 NOTE — ASSESSMENT & PLAN NOTE
Pt was out of his thyroid med for about 3 weeks and therefore recently TSH is elevated at 12  PT will restart thyroid supplement and recheck in 2 months

## 2019-09-19 ENCOUNTER — OFFICE VISIT (OUTPATIENT)
Dept: FAMILY MEDICINE CLINIC | Facility: CLINIC | Age: 69
End: 2019-09-19
Payer: MEDICARE

## 2019-09-19 VITALS
WEIGHT: 190 LBS | BODY MASS INDEX: 28.79 KG/M2 | HEART RATE: 60 BPM | DIASTOLIC BLOOD PRESSURE: 80 MMHG | HEIGHT: 68 IN | SYSTOLIC BLOOD PRESSURE: 140 MMHG

## 2019-09-19 DIAGNOSIS — E03.9 ACQUIRED HYPOTHYROIDISM: Primary | ICD-10-CM

## 2019-09-19 DIAGNOSIS — N40.1 BENIGN PROSTATIC HYPERPLASIA WITH URINARY OBSTRUCTION: ICD-10-CM

## 2019-09-19 DIAGNOSIS — F32.A DEPRESSION, UNSPECIFIED DEPRESSION TYPE: ICD-10-CM

## 2019-09-19 DIAGNOSIS — Z12.5 PROSTATE CANCER SCREENING: ICD-10-CM

## 2019-09-19 DIAGNOSIS — Z00.00 MEDICARE ANNUAL WELLNESS VISIT, SUBSEQUENT: ICD-10-CM

## 2019-09-19 DIAGNOSIS — R73.9 HYPERGLYCEMIA: ICD-10-CM

## 2019-09-19 DIAGNOSIS — E03.9 ACQUIRED HYPOTHYROIDISM: ICD-10-CM

## 2019-09-19 DIAGNOSIS — F41.9 ANXIETY: ICD-10-CM

## 2019-09-19 DIAGNOSIS — I10 ESSENTIAL HYPERTENSION: ICD-10-CM

## 2019-09-19 DIAGNOSIS — N13.8 BENIGN PROSTATIC HYPERPLASIA WITH URINARY OBSTRUCTION: ICD-10-CM

## 2019-09-19 PROCEDURE — G0439 PPPS, SUBSEQ VISIT: HCPCS | Performed by: PHYSICIAN ASSISTANT

## 2019-09-19 PROCEDURE — 99214 OFFICE O/P EST MOD 30 MIN: CPT | Performed by: PHYSICIAN ASSISTANT

## 2019-09-19 RX ORDER — LEVOTHYROXINE SODIUM 112 UG/1
112 TABLET ORAL DAILY
Qty: 90 TABLET | Refills: 3 | Status: SHIPPED | OUTPATIENT
Start: 2019-09-19 | End: 2020-01-03 | Stop reason: SDUPTHER

## 2019-09-19 NOTE — PROGRESS NOTES
Assessment and Plan:  I will see pt in one year with labs to review  Pt sees urology  Problem List Items Addressed This Visit        Endocrine    Acquired hypothyroidism - Primary     Pt was out of his thyroid med for about 3 weeks and therefore recently TSH is elevated at 12  PT will restart thyroid supplement and recheck in 2 months  Relevant Medications    levothyroxine 112 mcg tablet    Other Relevant Orders    TSH, 3rd generation with Free T4 reflex    Lipid Panel with Direct LDL reflex    TSH, 3rd generation with Free T4 reflex       Cardiovascular and Mediastinum    Essential hypertension     Stable continue current medications  CMP normal           Relevant Orders    Comprehensive metabolic panel       Genitourinary    Benign prostatic hyperplasia with urinary obstruction      PSA within normal limits  Other    Anxiety     Pt has stopped taking all of his psych meds for the past 3 months  He is feeling fine w/o at this time  Pt thinks that being retired he no longer is in need of this treatment  Observe  No SI or HI  Depression     Pt has stopped taking all of his psych meds for the past 3 months  He is feeling fine w/o at this time  Pt thinks that being retired he no longer is in need of this treatment  Observe  No SI or HI  Hyperglycemia      Very stable with a fasting sugar of 96 and hemoglobin A1c of 5 9  Recheck 1 year  Relevant Orders    Lipid Panel with Direct LDL reflex    Comprehensive metabolic panel    Hemoglobin A1C      Other Visit Diagnoses     Prostate cancer screening        Relevant Orders    PSA, Total Screen                 Diagnoses and all orders for this visit:    Acquired hypothyroidism  -     levothyroxine 112 mcg tablet; Take 1 tablet (112 mcg total) by mouth daily  -     TSH, 3rd generation with Free T4 reflex; Future  -     Lipid Panel with Direct LDL reflex;  Future  -     TSH, 3rd generation with Free T4 reflex; Future    Anxiety    Hyperglycemia  -     Lipid Panel with Direct LDL reflex; Future  -     Comprehensive metabolic panel; Future  -     Hemoglobin A1C; Future    Benign prostatic hyperplasia with urinary obstruction    Essential hypertension  -     Comprehensive metabolic panel; Future    Depression, unspecified depression type    Prostate cancer screening  -     PSA, Total Screen; Future    Acquired hypothyroidism   -     levothyroxine 112 mcg tablet; Take 1 tablet (112 mcg total) by mouth daily  -     TSH, 3rd generation with Free T4 reflex; Future  -     Lipid Panel with Direct LDL reflex; Future  -     TSH, 3rd generation with Free T4 reflex; Future              Subjective:      Patient ID: Baldemar Leventhal is a 76 y o  male  CC:    Chief Complaint   Patient presents with    Medicare Wellness Visit       HPI:      Baldemar Leventhal is here for chronic conditions f/u including the diagnosis of Acquired hypothyroidism  (primary encounter diagnosis)  Anxiety  Hyperglycemia  Benign prostatic hyperplasia with urinary obstruction  Essential hypertension   Pt  states they are taking all medications as directed without complaints or side effects   Pt  had labs done prior to today's visit which included Recent Results (from the past 672 hour(s))  -Lipid Panel with Direct LDL reflex  Collection Time: 09/13/19 10:56 AM       Result                      Value             Ref Range           Cholesterol                 185               50 - 200 mg/*       Triglycerides               82                <=150 mg/dL         HDL, Direct                 50                40 - 60 mg/dL       LDL Calculated              119 (H)           0 - 100 mg/dL  -Hemoglobin A1C  Collection Time: 09/13/19 10:56 AM       Result                      Value             Ref Range           Hemoglobin A1C              5 9               4 2 - 6 3 %         EAG                         123               mg/dl          -Comprehensive metabolic panel  Collection Time: 09/13/19 10:56 AM       Result                      Value             Ref Range           Sodium                      140               136 - 145 mm*       Potassium                   4 6               3 5 - 5 3 mm*       Chloride                    108               100 - 108 mm*       CO2                         28                21 - 32 mmol*       ANION GAP                   4                 4 - 13 mmol/L       BUN                         20                5 - 25 mg/dL        Creatinine                  1 08              0 60 - 1 30 *       Glucose, Fasting            96                65 - 99 mg/dL       Calcium                     9 7               8 3 - 10 1 m*       AST                         24                5 - 45 U/L          ALT                         23                12 - 78 U/L         Alkaline Phosphatase        60                46 - 116 U/L        Total Protein               7 5               6 4 - 8 2 g/*       Albumin                     4 2               3 5 - 5 0 g/*       Total Bilirubin             0 74              0 20 - 1 00 *       eGFR                        70                ml/min/1 73s*  -TSH, 3rd generation with Free T4 reflex  Collection Time: 09/13/19 10:56 AM       Result                      Value             Ref Range           TSH 3RD GENERATON           12 900 (H)        0 358 - 3 74*  -PSA Total, Diagnostic  Collection Time: 09/13/19 10:56 AM       Result                      Value             Ref Range           PSA, Diagnostic             2 7               0 0 - 4 0 ng*  -T4, free  Collection Time: 09/13/19 10:56 AM       Result                      Value             Ref Range           Free T4                     0 80              0 76 - 1 46 *        The following portions of the patient's history were reviewed and updated as appropriate: allergies, current medications, past family history, past medical history, past social history, past surgical history and problem list       Review of Systems   Constitutional: Negative  HENT: Negative  Eyes: Negative  Respiratory: Negative  Cardiovascular: Negative  Gastrointestinal: Negative  Endocrine: Negative  Genitourinary: Negative  Musculoskeletal: Negative  Skin: Negative  Allergic/Immunologic: Negative  Neurological: Negative  Hematological: Negative  Psychiatric/Behavioral: Negative  Data to review:       Objective:    Vitals:    09/19/19 1038   BP: 140/80   BP Location: Left arm   Patient Position: Sitting   Cuff Size: Standard   Pulse: 60   Weight: 86 2 kg (190 lb)   Height: 5' 8" (1 727 m)        Physical Exam   Constitutional: He is oriented to person, place, and time  He appears well-developed and well-nourished  No distress  HENT:   Head: Normocephalic and atraumatic  Eyes: Conjunctivae are normal  Right eye exhibits no discharge  Left eye exhibits no discharge  Neck: Carotid bruit is not present  Cardiovascular: Normal rate, regular rhythm and normal heart sounds  Exam reveals no gallop and no friction rub  No murmur heard  Pulmonary/Chest: Effort normal and breath sounds normal  No respiratory distress  He has no wheezes  He has no rales  Neurological: He is alert and oriented to person, place, and time  Skin: Skin is warm and dry  He is not diaphoretic  Psychiatric: He has a normal mood and affect  Judgment normal    Nursing note and vitals reviewed

## 2019-09-19 NOTE — PROGRESS NOTES
Assessment and Plan:     Problem List Items Addressed This Visit        Endocrine    Acquired hypothyroidism - Primary     Pt was out of his thyroid med for about 3 weeks and therefore recently TSH is elevated at 12  PT will restart thyroid supplement and recheck in 2 months  Relevant Medications    levothyroxine 112 mcg tablet    Other Relevant Orders    TSH, 3rd generation with Free T4 reflex    Lipid Panel with Direct LDL reflex    TSH, 3rd generation with Free T4 reflex       Cardiovascular and Mediastinum    Essential hypertension     Stable continue current medications  CMP normal           Relevant Orders    Comprehensive metabolic panel       Genitourinary    Benign prostatic hyperplasia with urinary obstruction      PSA within normal limits  Other    Anxiety     Pt has stopped taking all of his psych meds for the past 3 months  He is feeling fine w/o at this time  Pt thinks that being retired he no longer is in need of this treatment  Observe  No SI or HI  Depression     Pt has stopped taking all of his psych meds for the past 3 months  He is feeling fine w/o at this time  Pt thinks that being retired he no longer is in need of this treatment  Observe  No SI or HI  Hyperglycemia      Very stable with a fasting sugar of 96 and hemoglobin A1c of 5 9  Recheck 1 year  Relevant Orders    Lipid Panel with Direct LDL reflex    Comprehensive metabolic panel    Hemoglobin A1C      Other Visit Diagnoses     Prostate cancer screening        Relevant Orders    PSA, Total Screen           Preventive health issues were discussed with patient, and age appropriate screening tests were ordered as noted in patient's After Visit Summary  Personalized health advice and appropriate referrals for health education or preventive services given if needed, as noted in patient's After Visit Summary       History of Present Illness:     Patient presents for [facility] Annual Wellness visit    Patient Care Team:  Feng Lovett PA-C as PCP - General (Family Medicine)  MD Feng De La Paz PA-C     Problem List:     Patient Active Problem List   Diagnosis    Anxiety    Bladder cancer ([facility] [address] )    Depression    GERD without esophagitis    Hyperglycemia    Essential hypertension    Acquired hypothyroidism    Abnormal EKG    Cataract    Chest pain on exertion    Double vision    Equivocal stress test    Neck pain    Skin lump of leg, left    Benign prostatic hyperplasia with urinary obstruction    Osteoarthritis of knee      Past Medical and Surgical History:     Past Medical History:   Diagnosis Date    Anemia     Bladder cancer ([facility] [address] ) 2002    BPH with obstruction/lower urinary tract symptoms     Depression     Erectile dysfunction     Gross hematuria     Hypertension     Hypothyroid     Malignant neoplasm of lateral wall of urinary bladder ([facility] [address] )     Nocturia      Past Surgical History:   Procedure Laterality Date    BLADDER SURGERY  05/2002    bladder cancer surgery    CHOLECYSTECTOMY      Laparoscopic, age 36 something, per allscripts    CYSTECTOMY, PARTIAL      CYSTOSCOPY  2010    w/ biopsy    CYSTOSCOPY  2012,2013,2015,2016    KNEE ARTHROSCOPY      Therapeutic, Last assessed: 5/25/16      Family History:     Family History   Problem Relation Age of Onset    Alzheimer's disease Mother     Breast cancer Mother     Anxiety disorder Mother         generalized    Stroke Mother         syndrome    Cancer Brother         bladder    Prostate cancer Brother     Hypertension Family     Cancer Family     Alzheimer's disease Father     Hypertension Father       Social History:     Social History     Socioeconomic History    Marital status: /Civil Union     Spouse name: None    Number of children: None    Years of education: None    Highest education level: None   Occupational History    None   Social Needs    Financial resource strain: None    Food insecurity:     Worry: None     Inability: None    Transportation needs:     Medical: None     Non-medical: None   Tobacco Use    Smoking status: Former Smoker     Last attempt to quit: 1990     Years since quittin 0    Smokeless tobacco: Never Used    Tobacco comment: quit in , per allscripts, No secondhand smoke exposure, per allscripts   Substance and Sexual Activity    Alcohol use: No    Drug use: No    Sexual activity: None   Lifestyle    Physical activity:     Days per week: None     Minutes per session: None    Stress: None   Relationships    Social connections:     Talks on phone: None     Gets together: None     Attends Buddhism service: None     Active member of club or organization: None     Attends meetings of clubs or organizations: None     Relationship status: None    Intimate partner violence:     Fear of current or ex partner: None     Emotionally abused: None     Physically abused: None     Forced sexual activity: None   Other Topics Concern    None   Social History Narrative    Activities, leisure reading books    Recreational activities Running           Medications and Allergies:     Current Outpatient Medications   Medication Sig Dispense Refill    ALPRAZolam (XANAX) 0 25 mg tablet alprazolam 0 25 mg tablet      buPROPion (WELLBUTRIN SR) 150 mg 12 hr tablet Take 150 mg by mouth 2 (two) times a day  0    buPROPion (WELLBUTRIN XL) 300 mg 24 hr tablet Take 1 tablet by mouth daily      escitalopram (LEXAPRO) 5 mg tablet Take 1 tablet by mouth daily      lamoTRIgine (LAMICTAL) 200 MG tablet Take 1 tablet by mouth 2 (two) times a day      levothyroxine 112 mcg tablet Take 1 tablet (112 mcg total) by mouth daily 90 tablet 3    losartan (COZAAR) 50 mg tablet TAKE 1 TABLET DAILY  90 tablet 0    traZODone (DESYREL) 50 mg tablet Take 50 mg by mouth         No current facility-administered medications for this visit        No Known Allergies   Immunizations: Immunization History   Administered Date(s) Administered    H1N1, All Formulations 10/26/2009    Hep A, ped/adol, 2 dose 10/24/2013    INFLUENZA 11/09/2016, 11/21/2018    Influenza Split High Dose Preservative Free IM 11/09/2016, 12/07/2017, 11/14/2018    Pneumococcal Conjugate 13-Valent 07/12/2018    Pneumococcal Polysaccharide PPV23 12/28/2015    Tdap 12/07/2017    Typhoid Live, oral 10/24/2013    Yellow Fever 10/24/2013    Zoster 08/27/2013      Health Maintenance:         Topic Date Due    Hepatitis C Screening  09/19/2020 (Originally 1950)    CRC Screening: Colonoscopy  10/08/2023     There are no preventive care reminders to display for this patient  Medicare Health Risk Assessment:     /80 (BP Location: Left arm, Patient Position: Sitting, Cuff Size: Standard)   Pulse 60   Ht 5' 8" (1 727 m)   Wt 86 2 kg (190 lb)   BMI 28 89 kg/m²      Adriana Pritchard is here for his Subsequent Wellness visit  Health Risk Assessment:   Patient rates overall health as good  Patient feels that their physical health rating is same  Eyesight was rated as same  Hearing was rated as same  Patient feels that their emotional and mental health rating is same  Pain experienced in the last 7 days has been some  Patient's pain rating has been 3/10  Patient states that he has experienced no weight loss or gain in last 6 months  Depression Screening:   PHQ-2 Score: 0  PHQ-9 Score: 0      Fall Risk Screening: In the past year, patient has experienced: no history of falling in past year      Home Safety:  Patient does not have trouble with stairs inside or outside of their home  Patient has working smoke alarms and has working carbon monoxide detector  Home safety hazards include: loose rugs on the floor and not having non-slip bath and/or shower mats  Nutrition:   Current diet is Regular  Medications:   Patient is not currently taking any over-the-counter supplements   Patient is able to manage medications  Activities of Daily Living (ADLs)/Instrumental Activities of Daily Living (IADLs):   Walk and transfer into and out of bed and chair?: Yes  Dress and groom yourself?: Yes    Bathe or shower yourself?: Yes    Feed yourself? Yes  Do your laundry/housekeeping?: Yes  Manage your money, pay your bills and track your expenses?: Yes  Make your own meals?: Yes    Do your own shopping?: Yes    Previous Hospitalizations:   Any hospitalizations or ED visits within the last 12 months?: No      Advance Care Planning:   Living will: No    Durable POA for healthcare:  Yes    Advanced directive: Yes    Advanced directive counseling given: Yes    Five wishes given: Yes      Cognitive Screening:   Provider or family/friend/caregiver concerned regarding cognition?: No    PREVENTIVE SCREENINGS      Cardiovascular Screening:    General: Screening Current      Diabetes Screening:     General: Screening Current      Colorectal Cancer Screening:     General: Screening Current      Prostate Cancer Screening:    General: Screening Current      Osteoporosis Screening:    General: Screening Not Indicated      Abdominal Aortic Aneurysm (AAA) Screening:    Risk factors include: age between 73-67 yo and tobacco use        General: Screening Current      Lung Cancer Screening:     General: Screening Not Indicated      Hepatitis C Screening:    General: Screening Current      Valencia Cervantes PA-C

## 2019-09-19 NOTE — ASSESSMENT & PLAN NOTE
Pt has stopped taking all of his psych meds for the past 3 months  He is feeling fine w/o at this time  Pt thinks that being retired he no longer is in need of this treatment  Observe  No SI or HI

## 2019-09-19 NOTE — PATIENT INSTRUCTIONS
Problem List Items Addressed This Visit        Endocrine    Acquired hypothyroidism - Primary     Pt was out of his thyroid med for about 3 weeks and therefore recently TSH is elevated at 12  PT will restart thyroid supplement and recheck in 2 months  Relevant Medications    levothyroxine 112 mcg tablet    Other Relevant Orders    TSH, 3rd generation with Free T4 reflex    Lipid Panel with Direct LDL reflex    TSH, 3rd generation with Free T4 reflex       Cardiovascular and Mediastinum    Essential hypertension     Stable continue current medications  CMP normal           Relevant Orders    Comprehensive metabolic panel       Genitourinary    Benign prostatic hyperplasia with urinary obstruction      PSA within normal limits  Other    Anxiety     Pt has stopped taking all of his psych meds for the past 3 months  He is feeling fine w/o at this time  Pt thinks that being retired he no longer is in need of this treatment  Observe  No SI or HI  Depression     Pt has stopped taking all of his psych meds for the past 3 months  He is feeling fine w/o at this time  Pt thinks that being retired he no longer is in need of this treatment  Observe  No SI or HI  Hyperglycemia      Very stable with a fasting sugar of 96 and hemoglobin A1c of 5 9  Recheck 1 year  Relevant Orders    Lipid Panel with Direct LDL reflex    Comprehensive metabolic panel    Hemoglobin A1C    Medicare annual wellness visit, subsequent      All screenings and testings up-to-date  Five wishes Packet given with blue folder             Other Visit Diagnoses     Prostate cancer screening        Relevant Orders    PSA, Total Screen

## 2019-12-04 DIAGNOSIS — I10 ESSENTIAL HYPERTENSION: ICD-10-CM

## 2019-12-04 RX ORDER — LOSARTAN POTASSIUM 50 MG/1
TABLET ORAL
Qty: 90 TABLET | Refills: 3 | Status: SHIPPED | OUTPATIENT
Start: 2019-12-04 | End: 2020-08-24 | Stop reason: SDUPTHER

## 2019-12-10 ENCOUNTER — APPOINTMENT (OUTPATIENT)
Dept: LAB | Facility: CLINIC | Age: 69
End: 2019-12-10
Payer: MEDICARE

## 2019-12-10 DIAGNOSIS — E03.9 ACQUIRED HYPOTHYROIDISM: ICD-10-CM

## 2019-12-10 DIAGNOSIS — R73.9 HYPERGLYCEMIA: ICD-10-CM

## 2019-12-10 DIAGNOSIS — Z12.5 PROSTATE CANCER SCREENING: ICD-10-CM

## 2019-12-10 DIAGNOSIS — I10 ESSENTIAL HYPERTENSION: ICD-10-CM

## 2019-12-10 LAB
ALBUMIN SERPL BCP-MCNC: 4.2 G/DL (ref 3.5–5)
ALP SERPL-CCNC: 85 U/L (ref 46–116)
ALT SERPL W P-5'-P-CCNC: 25 U/L (ref 12–78)
ANION GAP SERPL CALCULATED.3IONS-SCNC: 4 MMOL/L (ref 4–13)
AST SERPL W P-5'-P-CCNC: 25 U/L (ref 5–45)
BILIRUB SERPL-MCNC: 0.55 MG/DL (ref 0.2–1)
BUN SERPL-MCNC: 15 MG/DL (ref 5–25)
CALCIUM SERPL-MCNC: 9.4 MG/DL (ref 8.3–10.1)
CHLORIDE SERPL-SCNC: 106 MMOL/L (ref 100–108)
CHOLEST SERPL-MCNC: 199 MG/DL (ref 50–200)
CO2 SERPL-SCNC: 31 MMOL/L (ref 21–32)
CREAT SERPL-MCNC: 1.14 MG/DL (ref 0.6–1.3)
EST. AVERAGE GLUCOSE BLD GHB EST-MCNC: 123 MG/DL
GFR SERPL CREATININE-BSD FRML MDRD: 65 ML/MIN/1.73SQ M
GLUCOSE P FAST SERPL-MCNC: 96 MG/DL (ref 65–99)
HBA1C MFR BLD: 5.9 % (ref 4.2–6.3)
HDLC SERPL-MCNC: 46 MG/DL
LDLC SERPL CALC-MCNC: 120 MG/DL (ref 0–100)
POTASSIUM SERPL-SCNC: 4.3 MMOL/L (ref 3.5–5.3)
PROT SERPL-MCNC: 7.8 G/DL (ref 6.4–8.2)
PSA SERPL-MCNC: 3.5 NG/ML (ref 0–4)
SODIUM SERPL-SCNC: 141 MMOL/L (ref 136–145)
T4 FREE SERPL-MCNC: 0.98 NG/DL (ref 0.76–1.46)
TRIGL SERPL-MCNC: 165 MG/DL
TSH SERPL DL<=0.05 MIU/L-ACNC: 7.94 UIU/ML (ref 0.36–3.74)

## 2019-12-10 PROCEDURE — 80053 COMPREHEN METABOLIC PANEL: CPT

## 2019-12-10 PROCEDURE — 84443 ASSAY THYROID STIM HORMONE: CPT

## 2019-12-10 PROCEDURE — 83036 HEMOGLOBIN GLYCOSYLATED A1C: CPT

## 2019-12-10 PROCEDURE — G0103 PSA SCREENING: HCPCS

## 2019-12-10 PROCEDURE — 84439 ASSAY OF FREE THYROXINE: CPT

## 2019-12-10 PROCEDURE — 80061 LIPID PANEL: CPT

## 2019-12-10 PROCEDURE — 36415 COLL VENOUS BLD VENIPUNCTURE: CPT

## 2019-12-11 DIAGNOSIS — E03.9 ACQUIRED HYPOTHYROIDISM: Primary | ICD-10-CM

## 2020-01-03 DIAGNOSIS — E03.9 ACQUIRED HYPOTHYROIDISM: ICD-10-CM

## 2020-01-03 RX ORDER — LEVOTHYROXINE SODIUM 0.12 MG/1
125 TABLET ORAL DAILY
Qty: 90 TABLET | Refills: 1 | OUTPATIENT
Start: 2020-01-03 | End: 2020-01-16 | Stop reason: SDUPTHER

## 2020-01-16 DIAGNOSIS — E03.9 ACQUIRED HYPOTHYROIDISM: ICD-10-CM

## 2020-01-16 RX ORDER — LEVOTHYROXINE SODIUM 0.12 MG/1
125 TABLET ORAL DAILY
Qty: 90 TABLET | Refills: 1 | OUTPATIENT
Start: 2020-01-16 | End: 2020-06-03 | Stop reason: SDUPTHER

## 2020-06-02 DIAGNOSIS — E03.9 ACQUIRED HYPOTHYROIDISM: ICD-10-CM

## 2020-06-02 RX ORDER — LEVOTHYROXINE SODIUM 0.12 MG/1
125 TABLET ORAL DAILY
Qty: 90 TABLET | Refills: 0 | OUTPATIENT
Start: 2020-06-02

## 2020-06-03 DIAGNOSIS — E03.9 ACQUIRED HYPOTHYROIDISM: Primary | ICD-10-CM

## 2020-06-03 RX ORDER — LEVOTHYROXINE SODIUM 0.12 MG/1
125 TABLET ORAL DAILY
Qty: 90 TABLET | Refills: 0 | Status: SHIPPED | OUTPATIENT
Start: 2020-06-03 | End: 2020-07-22

## 2020-07-22 DIAGNOSIS — E03.9 ACQUIRED HYPOTHYROIDISM: ICD-10-CM

## 2020-07-22 RX ORDER — LEVOTHYROXINE SODIUM 0.12 MG/1
TABLET ORAL
Qty: 90 TABLET | Refills: 0 | Status: SHIPPED | OUTPATIENT
Start: 2020-07-22 | End: 2020-12-16

## 2020-08-06 ENCOUNTER — LAB (OUTPATIENT)
Dept: LAB | Facility: MEDICAL CENTER | Age: 70
End: 2020-08-06
Payer: MEDICARE

## 2020-08-06 DIAGNOSIS — E03.9 ACQUIRED HYPOTHYROIDISM: ICD-10-CM

## 2020-08-06 DIAGNOSIS — Z12.5 PROSTATE CANCER SCREENING: ICD-10-CM

## 2020-08-06 DIAGNOSIS — R73.9 HYPERGLYCEMIA: Primary | ICD-10-CM

## 2020-08-06 DIAGNOSIS — E78.2 MIXED HYPERLIPIDEMIA: ICD-10-CM

## 2020-08-06 LAB — TSH SERPL DL<=0.05 MIU/L-ACNC: 2.45 UIU/ML (ref 0.36–3.74)

## 2020-08-06 PROCEDURE — 36415 COLL VENOUS BLD VENIPUNCTURE: CPT

## 2020-08-06 PROCEDURE — 84443 ASSAY THYROID STIM HORMONE: CPT

## 2020-08-06 NOTE — RESULT ENCOUNTER NOTE
Please let pt know that his TSH is normal range so no change in his thyroid strength  I have ordered a repeat TSH to be done with his yearly fasting blood work which is due in December  Please mail all lab orders just placed to pt  Thank you

## 2020-08-24 DIAGNOSIS — I10 ESSENTIAL HYPERTENSION: ICD-10-CM

## 2020-08-24 RX ORDER — LOSARTAN POTASSIUM 50 MG/1
50 TABLET ORAL DAILY
Qty: 90 TABLET | Refills: 0 | Status: SHIPPED | OUTPATIENT
Start: 2020-08-24 | End: 2021-02-15

## 2020-09-17 ENCOUNTER — LAB (OUTPATIENT)
Dept: LAB | Facility: MEDICAL CENTER | Age: 70
End: 2020-09-17
Payer: MEDICARE

## 2020-09-17 DIAGNOSIS — E03.9 ACQUIRED HYPOTHYROIDISM: ICD-10-CM

## 2020-09-17 LAB
T4 FREE SERPL-MCNC: 1.21 NG/DL (ref 0.76–1.46)
TSH SERPL DL<=0.05 MIU/L-ACNC: 4.81 UIU/ML (ref 0.36–3.74)

## 2020-09-17 PROCEDURE — 84439 ASSAY OF FREE THYROXINE: CPT

## 2020-09-17 PROCEDURE — 36415 COLL VENOUS BLD VENIPUNCTURE: CPT

## 2020-09-17 PROCEDURE — 84443 ASSAY THYROID STIM HORMONE: CPT

## 2020-09-17 NOTE — RESULT ENCOUNTER NOTE
I am not sure why pt went for his TSH lab  His last was done in August and we told him it was normal and to not repeat it until all his other labs were due in Dec  Orders for this have been placed  TSH was slightly elevated but I am not changing his meds so just make sure he is taking his med daily  Thank you

## 2020-10-12 ENCOUNTER — PROCEDURE VISIT (OUTPATIENT)
Dept: UROLOGY | Facility: MEDICAL CENTER | Age: 70
End: 2020-10-12
Payer: MEDICARE

## 2020-10-12 VITALS
BODY MASS INDEX: 26.53 KG/M2 | WEIGHT: 169 LBS | DIASTOLIC BLOOD PRESSURE: 80 MMHG | HEART RATE: 53 BPM | SYSTOLIC BLOOD PRESSURE: 130 MMHG | HEIGHT: 67 IN | TEMPERATURE: 97.5 F

## 2020-10-12 DIAGNOSIS — N40.1 BENIGN PROSTATIC HYPERPLASIA WITH URINARY OBSTRUCTION: ICD-10-CM

## 2020-10-12 DIAGNOSIS — Z85.51 PERSONAL HISTORY OF BLADDER CANCER: Primary | ICD-10-CM

## 2020-10-12 DIAGNOSIS — N13.8 BENIGN PROSTATIC HYPERPLASIA WITH URINARY OBSTRUCTION: ICD-10-CM

## 2020-10-12 LAB
SL AMB  POCT GLUCOSE, UA: NORMAL
SL AMB LEUKOCYTE ESTERASE,UA: NORMAL
SL AMB POCT BILIRUBIN,UA: NORMAL
SL AMB POCT BLOOD,UA: NORMAL
SL AMB POCT CLARITY,UA: CLEAR
SL AMB POCT COLOR,UA: YELLOW
SL AMB POCT KETONES,UA: NORMAL
SL AMB POCT NITRITE,UA: NORMAL
SL AMB POCT PH,UA: 6
SL AMB POCT SPECIFIC GRAVITY,UA: 1.02
SL AMB POCT URINE PROTEIN: NORMAL
SL AMB POCT UROBILINOGEN: 0.2

## 2020-10-12 PROCEDURE — 81003 URINALYSIS AUTO W/O SCOPE: CPT | Performed by: UROLOGY

## 2020-10-12 PROCEDURE — 99214 OFFICE O/P EST MOD 30 MIN: CPT | Performed by: UROLOGY

## 2020-10-12 PROCEDURE — 52000 CYSTOURETHROSCOPY: CPT | Performed by: UROLOGY

## 2020-12-16 DIAGNOSIS — E03.9 ACQUIRED HYPOTHYROIDISM: ICD-10-CM

## 2020-12-16 RX ORDER — LEVOTHYROXINE SODIUM 0.12 MG/1
TABLET ORAL
Qty: 90 TABLET | Refills: 0 | Status: SHIPPED | OUTPATIENT
Start: 2020-12-16 | End: 2021-07-20

## 2021-02-14 DIAGNOSIS — I10 ESSENTIAL HYPERTENSION: ICD-10-CM

## 2021-02-15 ENCOUNTER — LAB (OUTPATIENT)
Dept: LAB | Facility: MEDICAL CENTER | Age: 71
End: 2021-02-15
Payer: MEDICARE

## 2021-02-15 DIAGNOSIS — R73.9 HYPERGLYCEMIA: ICD-10-CM

## 2021-02-15 DIAGNOSIS — Z12.5 PROSTATE CANCER SCREENING: ICD-10-CM

## 2021-02-15 DIAGNOSIS — E78.2 MIXED HYPERLIPIDEMIA: ICD-10-CM

## 2021-02-15 DIAGNOSIS — E03.9 ACQUIRED HYPOTHYROIDISM: ICD-10-CM

## 2021-02-15 LAB
ALBUMIN SERPL BCP-MCNC: 4 G/DL (ref 3.5–5)
ALP SERPL-CCNC: 82 U/L (ref 46–116)
ALT SERPL W P-5'-P-CCNC: 23 U/L (ref 12–78)
ANION GAP SERPL CALCULATED.3IONS-SCNC: 3 MMOL/L (ref 4–13)
AST SERPL W P-5'-P-CCNC: 26 U/L (ref 5–45)
BILIRUB SERPL-MCNC: 0.59 MG/DL (ref 0.2–1)
BUN SERPL-MCNC: 21 MG/DL (ref 5–25)
CALCIUM SERPL-MCNC: 9.9 MG/DL (ref 8.3–10.1)
CHLORIDE SERPL-SCNC: 105 MMOL/L (ref 100–108)
CHOLEST SERPL-MCNC: 196 MG/DL (ref 50–200)
CO2 SERPL-SCNC: 33 MMOL/L (ref 21–32)
CREAT SERPL-MCNC: 1.07 MG/DL (ref 0.6–1.3)
GFR SERPL CREATININE-BSD FRML MDRD: 70 ML/MIN/1.73SQ M
GLUCOSE P FAST SERPL-MCNC: 86 MG/DL (ref 65–99)
HDLC SERPL-MCNC: 58 MG/DL
LDLC SERPL CALC-MCNC: 117 MG/DL (ref 0–100)
POTASSIUM SERPL-SCNC: 5 MMOL/L (ref 3.5–5.3)
PROT SERPL-MCNC: 7.5 G/DL (ref 6.4–8.2)
PSA SERPL-MCNC: 3.7 NG/ML (ref 0–4)
SODIUM SERPL-SCNC: 141 MMOL/L (ref 136–145)
T4 FREE SERPL-MCNC: 0.96 NG/DL (ref 0.76–1.46)
TRIGL SERPL-MCNC: 103 MG/DL
TSH SERPL DL<=0.05 MIU/L-ACNC: 4.22 UIU/ML (ref 0.36–3.74)

## 2021-02-15 PROCEDURE — G0103 PSA SCREENING: HCPCS

## 2021-02-15 PROCEDURE — 84443 ASSAY THYROID STIM HORMONE: CPT

## 2021-02-15 PROCEDURE — 80061 LIPID PANEL: CPT

## 2021-02-15 PROCEDURE — 84439 ASSAY OF FREE THYROXINE: CPT

## 2021-02-15 PROCEDURE — 36415 COLL VENOUS BLD VENIPUNCTURE: CPT

## 2021-02-15 PROCEDURE — 80053 COMPREHEN METABOLIC PANEL: CPT

## 2021-02-15 RX ORDER — LOSARTAN POTASSIUM 50 MG/1
TABLET ORAL
Qty: 90 TABLET | Refills: 0 | Status: SHIPPED | OUTPATIENT
Start: 2021-02-15 | End: 2021-06-03

## 2021-02-15 NOTE — TELEPHONE ENCOUNTER
Please call pt and let him know he was due for his normal labs in Dec  No further refills  until labs and OV to discuss

## 2021-02-23 ENCOUNTER — OFFICE VISIT (OUTPATIENT)
Dept: FAMILY MEDICINE CLINIC | Facility: CLINIC | Age: 71
End: 2021-02-23
Payer: MEDICARE

## 2021-02-23 VITALS
WEIGHT: 172 LBS | SYSTOLIC BLOOD PRESSURE: 128 MMHG | DIASTOLIC BLOOD PRESSURE: 60 MMHG | BODY MASS INDEX: 27 KG/M2 | TEMPERATURE: 98.3 F | HEART RATE: 56 BPM | HEIGHT: 67 IN

## 2021-02-23 DIAGNOSIS — F32.A DEPRESSION, UNSPECIFIED DEPRESSION TYPE: ICD-10-CM

## 2021-02-23 DIAGNOSIS — Z00.00 MEDICARE ANNUAL WELLNESS VISIT, SUBSEQUENT: ICD-10-CM

## 2021-02-23 DIAGNOSIS — I10 ESSENTIAL HYPERTENSION: ICD-10-CM

## 2021-02-23 DIAGNOSIS — E03.9 ACQUIRED HYPOTHYROIDISM: ICD-10-CM

## 2021-02-23 DIAGNOSIS — R73.9 HYPERGLYCEMIA: ICD-10-CM

## 2021-02-23 DIAGNOSIS — Z11.59 NEED FOR HEPATITIS C SCREENING TEST: Primary | ICD-10-CM

## 2021-02-23 PROCEDURE — G0438 PPPS, INITIAL VISIT: HCPCS | Performed by: PHYSICIAN ASSISTANT

## 2021-02-23 PROCEDURE — 1123F ACP DISCUSS/DSCN MKR DOCD: CPT | Performed by: PHYSICIAN ASSISTANT

## 2021-02-23 PROCEDURE — 99214 OFFICE O/P EST MOD 30 MIN: CPT | Performed by: PHYSICIAN ASSISTANT

## 2021-02-23 NOTE — ASSESSMENT & PLAN NOTE
Blue pack in 5 wishes given  New shingles vaccine recommended  Patient up-to-date with pneumonia x2  Blood work up-to-date for patient's age

## 2021-02-23 NOTE — PATIENT INSTRUCTIONS
Problem List Items Addressed This Visit        Endocrine    Acquired hypothyroidism       Cardiovascular and Mediastinum    Essential hypertension       Other    Depression    Hyperglycemia    Medicare annual wellness visit, subsequent       Blue pack in 5 wishes given  New shingles vaccine recommended  Patient up-to-date with pneumonia x2  Blood work up-to-date for patient's age             Other Visit Diagnoses     Need for hepatitis C screening test    -  Primary

## 2021-02-23 NOTE — PROGRESS NOTES
Assessment and Plan:     Problem List Items Addressed This Visit        Endocrine    Acquired hypothyroidism       Cardiovascular and Mediastinum    Essential hypertension       Other    Depression    Hyperglycemia    Medicare annual wellness visit, subsequent       Blue pack in 5 wishes given  New shingles vaccine recommended  Patient up-to-date with pneumonia x2  Blood work up-to-date for patient's age  Other Visit Diagnoses     Need for hepatitis C screening test    -  Primary        BMI Counseling: Body mass index is 26 94 kg/m²  The BMI is above normal  Nutrition recommendations include decreasing portion sizes, encouraging healthy choices of fruits and vegetables, decreasing fast food intake, consuming healthier snacks, limiting drinks that contain sugar, moderation in carbohydrate intake, increasing intake of lean protein, reducing intake of saturated and trans fat and reducing intake of cholesterol  Exercise recommendations include exercising 3-5 times per week  No pharmacotherapy was ordered  Preventive health issues were discussed with patient, and age appropriate screening tests were ordered as noted in patient's After Visit Summary  Personalized health advice and appropriate referrals for health education or preventive services given if needed, as noted in patient's After Visit Summary       History of Present Illness:     Patient presents for Medicare Annual Wellness visit    Patient Care Team:  Roque Sweeney PA-C as PCP - General (Family Medicine)  MD Roque Urbano PA-C     Problem List:     Patient Active Problem List   Diagnosis    Anxiety    Bladder cancer ([address] )    Depression    GERD without esophagitis    Hyperglycemia    Essential hypertension    Acquired hypothyroidism    Abnormal EKG    Cataract    Chest pain on exertion    Double vision    Equivocal stress test    Neck pain    Skin lump of leg, left    Benign prostatic hyperplasia with urinary obstruction    Osteoarthritis of knee    Medicare annual wellness visit, subsequent    Personal history of bladder cancer      Past Medical and Surgical History:     Past Medical History:   Diagnosis Date    Anemia     Bladder cancer ([address] )     BPH with obstruction/lower urinary tract symptoms     Depression     Erectile dysfunction     Gross hematuria     Hypertension     Hypothyroid     Malignant neoplasm of lateral wall of urinary bladder (HCC)     Nocturia      Past Surgical History:   Procedure Laterality Date    BLADDER SURGERY  2002    bladder cancer surgery    CHOLECYSTECTOMY      Laparoscopic, age 36 something, per allscripts    CYSTECTOMY, PARTIAL      CYSTOSCOPY      w/ biopsy    CYSTOSCOPY  ,,,    KNEE ARTHROSCOPY      Therapeutic, Last assessed: 16    MOUTH SURGERY  2020      Family History:     Family History   Problem Relation Age of Onset    Alzheimer's disease Mother     Breast cancer Mother     Anxiety disorder Mother         generalized    Stroke Mother         syndrome    Cancer Brother         bladder    Prostate cancer Brother     Hypertension Family     Cancer Family     Alzheimer's disease Father     Hypertension Father       Social History:        Social History     Socioeconomic History    Marital status: /Civil Union     Spouse name: None    Number of children: None    Years of education: None    Highest education level: None   Occupational History    None   Social Needs    Financial resource strain: None    Food insecurity     Worry: None     Inability: None    Transportation needs     Medical: None     Non-medical: None   Tobacco Use    Smoking status: Former Smoker     Quit date: 1990     Years since quittin 5    Smokeless tobacco: Never Used    Tobacco comment: quit in , per allscripts, No secondhand smoke exposure, per allscripts   Substance and Sexual Activity    Alcohol use: No    Drug use: No    Sexual activity: None   Lifestyle    Physical activity     Days per week: None     Minutes per session: None    Stress: None   Relationships    Social connections     Talks on phone: None     Gets together: None     Attends Orthodoxy service: None     Active member of club or organization: None     Attends meetings of clubs or organizations: None     Relationship status: None    Intimate partner violence     Fear of current or ex partner: None     Emotionally abused: None     Physically abused: None     Forced sexual activity: None   Other Topics Concern    None   Social History Narrative    Activities, leisure reading books    Recreational activities Running          Medications and Allergies:     Current Outpatient Medications   Medication Sig Dispense Refill    ALPRAZolam (XANAX) 0 25 mg tablet alprazolam 0 25 mg tablet      buPROPion (WELLBUTRIN SR) 150 mg 12 hr tablet Take 150 mg by mouth 2 (two) times a day  0    escitalopram (LEXAPRO) 5 mg tablet Take 1 tablet by mouth daily      lamoTRIgine (LAMICTAL) 200 MG tablet Take 1 tablet by mouth 2 (two) times a day      levothyroxine 125 mcg tablet TAKE 1 TABLET BY MOUTH EVERY DAY 90 tablet 0    losartan (COZAAR) 50 mg tablet TAKE 1 TABLET DAILY  90 tablet 0    traZODone (DESYREL) 50 mg tablet Take 50 mg by mouth        buPROPion (WELLBUTRIN XL) 300 mg 24 hr tablet Take 1 tablet by mouth daily       No current facility-administered medications for this visit        No Known Allergies   Immunizations:     Immunization History   Administered Date(s) Administered    H1N1, All Formulations 10/26/2009    Hep A, ped/adol, 2 dose 10/24/2013    INFLUENZA 11/09/2016, 11/21/2018, 12/01/2020    Influenza Split High Dose Preservative Free IM 11/09/2016, 12/07/2017, 11/14/2018, 11/08/2019    Influenza, high dose seasonal 0 7 mL 11/08/2019    Pneumococcal Conjugate 13-Valent 07/12/2018    Pneumococcal Polysaccharide PPV23 12/28/2015    Tdap 12/07/2017    Typhoid Live, oral 10/24/2013    Yellow Fever 10/24/2013    Zoster 08/27/2013      Health Maintenance:         Topic Date Due    Hepatitis C Screening  1950    Colorectal Cancer Screening  10/08/2023     There are no preventive care reminders to display for this patient  Medicare Health Risk Assessment:     /60   Pulse 56   Temp 98 3 °F (36 8 °C)   Ht 5' 7" (1 702 m)   Wt 78 kg (172 lb)   BMI 26 94 kg/m²      Carlos Espino is here for his Subsequent Wellness visit  Health Risk Assessment:   Patient rates overall health as very good  Patient feels that their physical health rating is same  Eyesight was rated as same  Hearing was rated as same  Patient feels that their emotional and mental health rating is same  Pain experienced in the last 7 days has been some  Patient's pain rating has been 3/10  Patient states that he has experienced no weight loss or gain in last 6 months  Depression Screening:   PHQ-2 Score: 1  PHQ-9 Score: 1      Fall Risk Screening: In the past year, patient has experienced: no history of falling in past year      Home Safety:  Patient does not have trouble with stairs inside or outside of their home  Patient has working smoke alarms and has working carbon monoxide detector  Home safety hazards include: none  Nutrition:   Current diet is Regular  Medications:   Patient is currently taking over-the-counter supplements  OTC medications include: see medication list  Patient is able to manage medications  Activities of Daily Living (ADLs)/Instrumental Activities of Daily Living (IADLs):   Walk and transfer into and out of bed and chair?: Yes  Dress and groom yourself?: Yes    Bathe or shower yourself?: Yes    Feed yourself?  Yes  Do your laundry/housekeeping?: Yes  Manage your money, pay your bills and track your expenses?: Yes  Make your own meals?: Yes    Do your own shopping?: Yes    Previous Hospitalizations:   Any hospitalizations or ED visits within the last 12 months?: No      Advance Care Planning:   Living will: No    Durable POA for healthcare: No    Advanced directive: No      PREVENTIVE SCREENINGS      Cardiovascular Screening:    General: Screening Not Indicated, History Lipid Disorder and Screening Current      Diabetes Screening:     General: Screening Current      Colorectal Cancer Screening:     General: Screening Current      Prostate Cancer Screening:    General: Screening Current      Osteoporosis Screening:    General: Screening Not Indicated      Abdominal Aortic Aneurysm (AAA) Screening:    Risk factors include: age between 73-69 yo and tobacco use        General: Screening Not Indicated      Lung Cancer Screening:     General: Screening Not Indicated      Hepatitis C Screening:      Hep C Screening Accepted:  Yes        Rachel Alvarez PA-C

## 2021-02-23 NOTE — ASSESSMENT & PLAN NOTE
TSH continues to be slightly elevated at 4 22  This is less than a point away from normal and patient thinks he does miss this medication often because he takes it separately than his other medication  He will try harder ever memory to take and we will check it again in 4 months  Call with results

## 2021-02-23 NOTE — PROGRESS NOTES
Assessment and Plan:    Problem List Items Addressed This Visit        Endocrine    Acquired hypothyroidism       TSH continues to be slightly elevated at 4 22  This is less than a point away from normal and patient thinks he does miss this medication often because he takes it separately than his other medication  He will try harder ever memory to take and we will check it again in 4 months  Call with results  Relevant Orders    TSH, 3rd generation with Free T4 reflex    Lipid panel    TSH, 3rd generation with Free T4 reflex       Cardiovascular and Mediastinum    Essential hypertension       Very stable continue current medication  Relevant Orders    Comprehensive metabolic panel       Other    Depression      No SI or HI  Continue with psychiatry  Hyperglycemia       Stable with a fasting sugar under 100  Relevant Orders    Comprehensive metabolic panel    Medicare annual wellness visit, subsequent       Blue pack in 5 wishes given  New shingles vaccine recommended  Patient up-to-date with pneumonia x2  Blood work up-to-date for patient's age  Other Visit Diagnoses     Need for hepatitis C screening test    -  Primary    Relevant Orders    Hepatitis C Antibody ([facility])                 Diagnoses and all orders for this visit:    Need for hepatitis C screening test  -     Hepatitis C Antibody ([facility]); Future    Acquired hypothyroidism  -     TSH, 3rd generation with Free T4 reflex; Future  -     Lipid panel; Future  -     TSH, 3rd generation with Free T4 reflex; Future    Essential hypertension  -     Comprehensive metabolic panel; Future    Hyperglycemia  -     Comprehensive metabolic panel; Future    Depression, unspecified depression type    Medicare annual wellness visit, subsequent              Subjective:      Patient ID: Meka Wong is a 79 y o  male      CC:    Chief Complaint   Patient presents with    Follow-up     patient is here for a check up for chronic medical conditions  patient needs to review blood work results  ak       HPI:      [address] is here for chronic conditions f/u including the diagnosis of Need for hepatitis c screening test  (primary encounter diagnosis)  Acquired hypothyroidism  Essential hypertension  Hyperglycemia  Depression, unspecified depression type  Medicare annual wellness visit, subsequent   Pt  states they are taking all medications as directed without complaints or side effects   Pt  had labs done prior to today's visit which included Recent Results (from the past 672 hour(s))  -Comprehensive metabolic panel  Collection Time: 02/15/21 10:54 AM       Result                      Value             Ref Range           Sodium                      141               136 - 145 mm*       Potassium                   5 0               3 5 - 5 3 mm*       Chloride                    105               100 - 108 mm*       CO2                         33 (H)            21 - 32 mmol*       ANION GAP                   3 (L)             4 - 13 mmol/L       BUN                         21                5 - 25 mg/dL        Creatinine                  1 07              0 60 - 1 30 *       Glucose, Fasting            86                65 - 99 mg/dL       Calcium                     9 9               8 3 - 10 1 m*       AST                         26                5 - 45 U/L          ALT                         23                12 - 78 U/L         Alkaline Phosphatase        82                46 - 116 U/L        Total Protein               7 5               6 4 - 8 2 g/*       Albumin                     4 0               3 5 - 5 0 g/*       Total Bilirubin             0 59              0 20 - 1 00 *       eGFR                        70                ml/min/1 73s*  -Lipid Panel with Direct LDL reflex  Collection Time: 02/15/21 10:54 AM       Result                      Value             Ref Range           Cholesterol 196               50 - 200 mg/*       Triglycerides               103               <=150 mg/dL         HDL, Direct                 58                >=40 mg/dL          LDL Calculated              117 (H)           0 - 100 mg/dL  -PSA, Total Screen  Collection Time: 02/15/21 10:54 AM       Result                      Value             Ref Range           PSA                         3 7               0 0 - 4 0 ng*  -TSH, 3rd generation with Free T4 reflex  Collection Time: 02/15/21 10:54 AM       Result                      Value             Ref Range           TSH 3RD GENERATON           4 220 (H)         0 358 - 3 74*  -T4, free  Collection Time: 02/15/21 10:54 AM       Result                      Value             Ref Range           Free T4                     0 96              0 76 - 1 46 *        The following portions of the patient's history were reviewed and updated as appropriate: allergies, current medications, past family history, past medical history, past social history, past surgical history and problem list       Review of Systems   Constitutional: Negative  HENT: Negative  Eyes: Negative  Respiratory: Negative  Cardiovascular: Negative  Gastrointestinal: Negative  Endocrine: Negative  Genitourinary: Negative  Musculoskeletal: Negative  Skin: Negative  Allergic/Immunologic: Negative  Neurological: Negative  Hematological: Negative  Psychiatric/Behavioral: Negative  Data to review:       Objective:    Vitals:    02/23/21 1443   BP: 128/60   Pulse: 56   Temp: 98 3 °F (36 8 °C)   Weight: 78 kg (172 lb)   Height: 5' 7" (1 702 m)        Physical Exam  Vitals signs and nursing note reviewed  Constitutional:       Appearance: Normal appearance  HENT:      Head: Normocephalic and atraumatic  Eyes:      General: Lids are normal       Conjunctiva/sclera: Conjunctivae normal       Pupils: Pupils are equal, round, and reactive to light     Cardiovascular: Rate and Rhythm: Normal rate and regular rhythm  Heart sounds: Normal heart sounds  Pulmonary:      Effort: Pulmonary effort is normal       Breath sounds: Normal breath sounds  Skin:     General: Skin is warm and dry  Neurological:      General: No focal deficit present  Mental Status: He is alert  Mental status is at baseline  Psychiatric:         Mood and Affect: Mood normal          Behavior: Behavior normal          Thought Content: Thought content normal          Judgment: Judgment normal            BMI Counseling: Body mass index is 26 94 kg/m²  The BMI is above normal  Nutrition recommendations include decreasing portion sizes, encouraging healthy choices of fruits and vegetables, decreasing fast food intake, consuming healthier snacks, limiting drinks that contain sugar, moderation in carbohydrate intake, increasing intake of lean protein, reducing intake of saturated and trans fat and reducing intake of cholesterol  Exercise recommendations include exercising 3-5 times per week  No pharmacotherapy was ordered

## 2021-06-03 DIAGNOSIS — I10 ESSENTIAL HYPERTENSION: ICD-10-CM

## 2021-06-03 RX ORDER — LOSARTAN POTASSIUM 50 MG/1
TABLET ORAL
Qty: 90 TABLET | Refills: 0 | Status: SHIPPED | OUTPATIENT
Start: 2021-06-03 | End: 2021-09-09

## 2021-06-14 ENCOUNTER — OFFICE VISIT (OUTPATIENT)
Dept: FAMILY MEDICINE CLINIC | Facility: CLINIC | Age: 71
End: 2021-06-14
Payer: MEDICARE

## 2021-06-14 ENCOUNTER — APPOINTMENT (OUTPATIENT)
Dept: RADIOLOGY | Facility: MEDICAL CENTER | Age: 71
End: 2021-06-14
Payer: MEDICARE

## 2021-06-14 VITALS
HEART RATE: 60 BPM | RESPIRATION RATE: 16 BRPM | DIASTOLIC BLOOD PRESSURE: 78 MMHG | HEIGHT: 67 IN | WEIGHT: 181 LBS | SYSTOLIC BLOOD PRESSURE: 144 MMHG | BODY MASS INDEX: 28.41 KG/M2

## 2021-06-14 DIAGNOSIS — M54.16 LUMBAR RADICULOPATHY: Primary | ICD-10-CM

## 2021-06-14 DIAGNOSIS — M25.559 HIP PAIN: ICD-10-CM

## 2021-06-14 DIAGNOSIS — M54.16 LUMBAR RADICULOPATHY: ICD-10-CM

## 2021-06-14 PROCEDURE — 73502 X-RAY EXAM HIP UNI 2-3 VIEWS: CPT

## 2021-06-14 PROCEDURE — 99214 OFFICE O/P EST MOD 30 MIN: CPT | Performed by: PHYSICIAN ASSISTANT

## 2021-06-14 PROCEDURE — 72110 X-RAY EXAM L-2 SPINE 4/>VWS: CPT

## 2021-06-14 RX ORDER — MELOXICAM 15 MG/1
15 TABLET ORAL DAILY
Qty: 30 TABLET | Refills: 1 | Status: SHIPPED | OUTPATIENT
Start: 2021-06-14 | End: 2021-08-10

## 2021-06-14 NOTE — PROGRESS NOTES
Assessment and Plan:    Problem List Items Addressed This Visit        Nervous and Auditory    Lumbar radiculopathy - Primary     Check lumbar spine xray  Relevant Medications    meloxicam (MOBIC) 15 mg tablet    Other Relevant Orders    XR hip/pelv 2-3 vws right if performed    XR spine lumbar minimum 4 views non injury       Other    Hip pain     Check hip xray  Trial mobic 15 mg once daily  Relevant Medications    meloxicam (MOBIC) 15 mg tablet    Other Relevant Orders    XR hip/pelv 2-3 vws right if performed    XR spine lumbar minimum 4 views non injury                 Diagnoses and all orders for this visit:    Lumbar radiculopathy  -     XR hip/pelv 2-3 vws right if performed; Future  -     XR spine lumbar minimum 4 views non injury; Future  -     meloxicam (MOBIC) 15 mg tablet; Take 1 tablet (15 mg total) by mouth daily    Hip pain  -     XR hip/pelv 2-3 vws right if performed; Future  -     XR spine lumbar minimum 4 views non injury; Future  -     meloxicam (MOBIC) 15 mg tablet; Take 1 tablet (15 mg total) by mouth daily              Subjective:      Patient ID: Dasia Mckee is a 79 y o  male  CC:    Chief Complaint   Patient presents with    Leg Pain     pt here with right leg pain x 3 months and getting worse  Pt is taking tylenol and aleve  R anila       HPI:    Pt  here today as he has had increasing pain in the outer upper area of his R leg that goes down the outside of his leg to his ankle for the past few months  He does wear and sit on his wallet in his R back pant pocket  He has been dong a lot of yard work such as digging up stumps of bushes  He is finding himself going up steps one at a time  He states that the only reason he is here is that his family has been bugging him  He has been using Motrin or motrin like products with full doses of tylenol and this is no longer helping as it had been   He is leaving this weekend for [place] for 2 weeks to spend time with his wifes family which they do yearly  No changes in urinary habits  The following portions of the patient's history were reviewed and updated as appropriate: allergies, current medications, past family history, past medical history, past social history, past surgical history and problem list       Review of Systems   Constitutional: Negative  HENT: Negative  Eyes: Negative  Respiratory: Negative  Cardiovascular: Negative  Gastrointestinal: Negative  Endocrine: Negative  Genitourinary: Negative  Musculoskeletal:        R leg pain   Skin: Negative  Allergic/Immunologic: Negative  Neurological: Negative  Hematological: Negative  Psychiatric/Behavioral: Negative  Data to review:       Objective:    Vitals:    06/14/21 1253   BP: 144/78   BP Location: Left arm   Patient Position: Sitting   Cuff Size: Large   Pulse: 60   Resp: 16   Weight: 82 1 kg (181 lb)   Height: 5' 7" (1 702 m)        Physical Exam  Vitals and nursing note reviewed  Constitutional:       Appearance: Normal appearance  HENT:      Head: Normocephalic and atraumatic  Eyes:      General: Lids are normal       Conjunctiva/sclera: Conjunctivae normal       Pupils: Pupils are equal, round, and reactive to light  Cardiovascular:      Rate and Rhythm: Normal rate and regular rhythm  Heart sounds: Normal heart sounds  Pulmonary:      Effort: Pulmonary effort is normal       Breath sounds: Normal breath sounds  Musculoskeletal:      Right hip: Tenderness present  Legs:       Comments: Tenderness to palpation of the near sciatic notch with continues tenderness to the outer upper leg/hip greater trochanteric area  Good strength robert  LE  Pt ambulating w/o difficulty  Pain in hip area to straight leg raise  Skin:     General: Skin is warm and dry  Neurological:      General: No focal deficit present  Mental Status: He is alert  Mental status is at baseline     Psychiatric:         Mood and Affect: Mood normal          Behavior: Behavior normal          Thought Content:  Thought content normal          Judgment: Judgment normal

## 2021-06-14 NOTE — PATIENT INSTRUCTIONS
Problem List Items Addressed This Visit        Nervous and Auditory    Lumbar radiculopathy - Primary    Relevant Medications    meloxicam (MOBIC) 15 mg tablet    Other Relevant Orders    XR hip/pelv 2-3 vws right if performed    XR spine lumbar minimum 4 views non injury       Other    Hip pain     Check hip xray  Trial mobic 15 mg once daily            Relevant Medications    meloxicam (MOBIC) 15 mg tablet    Other Relevant Orders    XR hip/pelv 2-3 vws right if performed    XR spine lumbar minimum 4 views non injury

## 2021-06-29 ENCOUNTER — TELEPHONE (OUTPATIENT)
Dept: FAMILY MEDICINE CLINIC | Facility: CLINIC | Age: 71
End: 2021-06-29

## 2021-06-29 NOTE — TELEPHONE ENCOUNTER
----- Message from Kayla Tipton MD sent at 6/25/2021  2:15 PM EDT -----  Xray mild arthritis, rec PT eval

## 2021-07-06 DIAGNOSIS — M54.16 LUMBAR RADICULOPATHY: Primary | ICD-10-CM

## 2021-07-12 ENCOUNTER — LAB (OUTPATIENT)
Dept: LAB | Facility: MEDICAL CENTER | Age: 71
End: 2021-07-12
Payer: MEDICARE

## 2021-07-12 DIAGNOSIS — R73.9 HYPERGLYCEMIA: ICD-10-CM

## 2021-07-12 DIAGNOSIS — E03.9 ACQUIRED HYPOTHYROIDISM: ICD-10-CM

## 2021-07-12 DIAGNOSIS — I10 ESSENTIAL HYPERTENSION: ICD-10-CM

## 2021-07-12 LAB
ALBUMIN SERPL BCP-MCNC: 3.9 G/DL (ref 3.5–5)
ALP SERPL-CCNC: 65 U/L (ref 46–116)
ALT SERPL W P-5'-P-CCNC: 25 U/L (ref 12–78)
ANION GAP SERPL CALCULATED.3IONS-SCNC: 1 MMOL/L (ref 4–13)
AST SERPL W P-5'-P-CCNC: 22 U/L (ref 5–45)
BILIRUB SERPL-MCNC: 0.44 MG/DL (ref 0.2–1)
BUN SERPL-MCNC: 23 MG/DL (ref 5–25)
CALCIUM SERPL-MCNC: 9.3 MG/DL (ref 8.3–10.1)
CHLORIDE SERPL-SCNC: 105 MMOL/L (ref 100–108)
CO2 SERPL-SCNC: 30 MMOL/L (ref 21–32)
CREAT SERPL-MCNC: 1.13 MG/DL (ref 0.6–1.3)
GFR SERPL CREATININE-BSD FRML MDRD: 65 ML/MIN/1.73SQ M
GLUCOSE SERPL-MCNC: 145 MG/DL (ref 65–140)
POTASSIUM SERPL-SCNC: 4.6 MMOL/L (ref 3.5–5.3)
PROT SERPL-MCNC: 7.6 G/DL (ref 6.4–8.2)
SODIUM SERPL-SCNC: 136 MMOL/L (ref 136–145)
TSH SERPL DL<=0.05 MIU/L-ACNC: 2.89 UIU/ML (ref 0.36–3.74)

## 2021-07-12 PROCEDURE — 80053 COMPREHEN METABOLIC PANEL: CPT

## 2021-07-12 PROCEDURE — 36415 COLL VENOUS BLD VENIPUNCTURE: CPT

## 2021-07-12 PROCEDURE — 84443 ASSAY THYROID STIM HORMONE: CPT

## 2021-07-13 NOTE — RESULT ENCOUNTER NOTE
PT was given the results of normal TSH  Lab william CMP when they were not supposed to as was to be done next year along with the other orders in chart so will ignore non fasting glucose elevation

## 2021-07-15 ENCOUNTER — EVALUATION (OUTPATIENT)
Dept: PHYSICAL THERAPY | Facility: MEDICAL CENTER | Age: 71
End: 2021-07-15
Payer: MEDICARE

## 2021-07-15 DIAGNOSIS — M54.16 LUMBAR RADICULOPATHY: Primary | ICD-10-CM

## 2021-07-15 PROCEDURE — 97161 PT EVAL LOW COMPLEX 20 MIN: CPT | Performed by: PHYSICAL THERAPIST

## 2021-07-15 PROCEDURE — 97140 MANUAL THERAPY 1/> REGIONS: CPT | Performed by: PHYSICAL THERAPIST

## 2021-07-15 NOTE — PROGRESS NOTES
PT Evaluation     Today's date: 7/15/2021  Patient name: Omer Joshi  : 1950  MRN: [id number]  Referring provider: Duke Noble  Dx:   Encounter Diagnosis   Name Primary?  Lumbar radiculopathy                   Assessment  Assessment details: Omer Joshi is a 78 y/o male who presents with complaints of acute low back pain  The patient's greatest concerns are increased pain c/ walking and inability to perform daily activities without pain  Primary movement impairment diagnosis of lumbar hypomobility, resulting in pathoanatomical symptoms of lumbar radiculopathy, which limits his ability to perform functional activities without pain  Pt  will benefit from skilled PT services that includes manual therapy techniques to enhance tissue extensibility, neuromuscular re-education to facilitate motor control, therapeutic exercise to increase functional mobility, and modalities prn to reduce pain and inflammation  Impairments: abnormal coordination, abnormal muscle firing, abnormal muscle tone, abnormal or restricted ROM, abnormal movement, activity intolerance, impaired physical strength, lacks appropriate home exercise program, pain with function, poor posture  and poor body mechanics  Understanding of Dx/Px/POC: good   Prognosis: good    Goals  Short Term Goals: to be achieved by 4 weeks  1) Patient to be independent with basic HEP  2) Decrease pain to 3/10 at its worst   3) [facility] in all deficient planes  4) Increase LE strength by 1/2 MMT grade in all deficient planes  5) Increase ambulatory tolerance to at least  5 miles without pain  Long Term Goals: to be achieved by discharge  1) FOTO equal to or greater than goal status  2) Patient to be independent with comprehensive HEP  3) Abolish pain for improved quality of life  4) Lumbar spine ROM WNL all planes to improve a/iadls    5) Increase LE strength to 5/5 MMT grade in all planes to improve a/iadls  6) Increase ambulatory tolerance to 1 mile without pain  Plan  Patient would benefit from: PT eval  Planned modality interventions: thermotherapy: hydrocollator packs  Planned therapy interventions: joint mobilization, manual therapy, neuromuscular re-education, patient education, postural training, stretching, strengthening, therapeutic exercise, home exercise program, graded exercise, flexibility, functional ROM exercises, abdominal trunk stabilization and body mechanics training  Frequency: 2x week  Duration in weeks: 6  Treatment plan discussed with: patient      Subjective Evaluation    History of Present Illness  Mechanism of injury: Patient states that ~9 weeks ago after doing a lot of yard work, he started having low back pain after c/ increased symptoms into the R posterolateral hip and lateral ankle in the last 6 weeks  Patient notes that the pain increases with walking and standing, but will also increase if he sits for long periods of time  He denies changes in bowel/bladder  Patient has a hx of bladder cancer  He denies pain that wakes him at night  Patient would like to increase his ambulatory tolerance without pain  Pain  Current pain ratin  At best pain ratin  At worst pain ratin  Quality: burning and radiating (tingling)  Relieving factors: medications  Aggravating factors: standing, walking and sitting      Diagnostic Tests  Abnormal x-ray: Degenerative changes as noted  Treatments  Previous treatment: medication  Current treatment: physical therapy  Patient Goals  Patient goals for therapy: increased strength, independence with ADLs/IADLs, increased motion and decreased pain  Patient goal: Patient would like to be able to walk without pain again  Currenlty he can walk ~ 15 miles without pain        Objective     Concurrent Complaints  Positive for kidney problem (Hx of kidney stones), gallbladder problem (Hx of cholecystectomy) and history of cancer (Hx of bladder CA)  Negative for night pain, disturbed sleep, bladder dysfunction, bowel dysfunction, saddle (S4) numbness, cardiac problem, stomach problem, ulcer, appendix problem, spleen problem, pancreas problem, history of trauma and infection    Postural Observations  Seated posture: fair  Standing posture: fair        Palpation     Right   Hypertonic in the quadratus lumborum  Tenderness of the lumbar paraspinals and quadratus lumborum  Tenderness     Lumbar Spine  Tenderness in the spinous process (L4-5) and facet joint (L4-5, L5-S1)  Left Hip   No tenderness in the PSIS  Right Hip   No tenderness in the PSIS       Neurological Testing     Sensation     Lumbar   Left   Intact: light touch    Right   Intact: light touch    Reflexes   Left   Patellar (L4): normal (2+)  Achilles (S1): normal (2+)    Right   Patellar (L4): normal (2+)  Achilles (S1): normal (2+)    Active Range of Motion     Lumbar   Flexion:  Restriction level: minimal  Extension:  with pain Restriction level: moderate  Left lateral flexion:  Restriction level: minimal  Right lateral flexion:  with pain Restriction level: moderate  Left rotation:  Restriction level: minimal  Right rotation:  with pain Restriction level: moderate    Passive Range of Motion     Additional Passive Range of Motion Details  PAROM:  Pain and hypomobility at L4-5 and L5-S1 c/ UPAs    Joint Play     Hypomobile: L1, L2, L3, L4, L5 and S1     Pain: L4, L5 and S1     Strength/Myotome Testing     Lumbar   Left   Heel walk: normal  Toe walk: normal    Right   Heel walk: normal  Toe walk: normal    Left Hip   Planes of Motion   Flexion: 5  Extension: 3+  Abduction: 4-  Adduction: 3+    Right Hip   Planes of Motion   Flexion: 5  Extension: 3+  Abduction: 3+  Adduction: 3+    Left Knee   Flexion: 5  Extension: 5    Right Knee   Flexion: 5  Extension: 5    Left Ankle/Foot   Dorsiflexion: 5  Plantar flexion: 5  Great toe extension: 5    Right Ankle/Foot   Dorsiflexion: 4-  Plantar flexion: 5  Great toe extension: 4    Tests     Lumbar   Negative SIJ compression, sacroiliac distraction and sacral spring   Left   Negative crossed SLR and passive SLR  Right   Positive quadrant  Negative crossed SLR and passive SLR  Left Hip   Positive Ely's  90/90 SLR: Positive  Right Hip   Positive piriformis  Negative HOLLIS and FADIR    90/90 SLR: Positive       General Comments:    Lower quarter screen   Knees: unremarkable  Foot/ankle: unremarkable    Hip Comments   Limited R hip ROM secondary to pain     L hip unremarkable       Precautions:  Hx of bladder CA; HTN    Manuals 7/15            UPAs R L4-S1 AZ            L sidelying mob AZ            Piriformis release AZ                         Neuro Re-Ed             TA isometrics                                                    Ther Ex             LTR 20x5s            SKTC 10x 10s            Sup piriformis str 3x30s                         Hamstring str sit                                                    Ther Activity                                       Gait Training                                       Modalities             RADHA Dewey, PT  7/15/2021,3:14 PM

## 2021-07-21 ENCOUNTER — OFFICE VISIT (OUTPATIENT)
Dept: PHYSICAL THERAPY | Facility: MEDICAL CENTER | Age: 71
End: 2021-07-21
Payer: MEDICARE

## 2021-07-21 DIAGNOSIS — M54.16 LUMBAR RADICULOPATHY: Primary | ICD-10-CM

## 2021-07-21 PROCEDURE — 97140 MANUAL THERAPY 1/> REGIONS: CPT | Performed by: PHYSICAL THERAPIST

## 2021-07-21 PROCEDURE — 97110 THERAPEUTIC EXERCISES: CPT | Performed by: PHYSICAL THERAPIST

## 2021-07-21 NOTE — PROGRESS NOTES
Daily Note     Today's date: 2021  Patient name: Tonio Dalal  : 1950  MRN: [id number]  Referring provider: Sandra Robles PA-C  Dx:   Encounter Diagnosis     ICD-10-CM    1  Lumbar radiculopathy  M54 16                   Subjective:  Patient states that he is feeling better  Objective: See treatment diary below  Assessment:  Patient presents c/ decreased symptoms t/o R lateral calf and ankle  He had no symptoms c/ ambulation post manuals  Patient tolerated all exercises well without pain  Tolerated treatment well  Patient would benefit from continued PT    Plan: Continue per plan of care        Precautions:  Hx of bladder CA; HTN    Manuals 7/15 7/21           UPAs R L4-S1 AZ AZ           L sidelying mob AZ AZ           Piriformis release AZ AZ                        Neuro Re-Ed             TA isometrics                                                    Ther Ex             LTR 20x5s 20x5s           SKTC 10x 10s 10x 10s           Sup piriformis str 3x30s 3x30s           Sciatic n glide  10x b/l           Hamstring str sit  3x30s           Standing gastroc str  3x30s                                     Ther Activity                                       Gait Training                                       Modalities               10'

## 2021-07-26 ENCOUNTER — APPOINTMENT (OUTPATIENT)
Dept: PHYSICAL THERAPY | Facility: MEDICAL CENTER | Age: 71
End: 2021-07-26
Payer: MEDICARE

## 2021-07-28 ENCOUNTER — OFFICE VISIT (OUTPATIENT)
Dept: PHYSICAL THERAPY | Facility: MEDICAL CENTER | Age: 71
End: 2021-07-28
Payer: MEDICARE

## 2021-07-28 DIAGNOSIS — M54.16 LUMBAR RADICULOPATHY: Primary | ICD-10-CM

## 2021-07-28 PROCEDURE — 97110 THERAPEUTIC EXERCISES: CPT | Performed by: PHYSICAL THERAPIST

## 2021-07-28 PROCEDURE — 97140 MANUAL THERAPY 1/> REGIONS: CPT | Performed by: PHYSICAL THERAPIST

## 2021-07-28 NOTE — PROGRESS NOTES
Daily Note     Today's date: 2021  Patient name: Delma Fish  : 1950  MRN: [id number]  Referring provider: Levi De Souza PA-C  Dx:   Encounter Diagnosis     ICD-10-CM    1  Lumbar radiculopathy  M54 16                   Subjective:  Patient states that he feels good  Objective: See treatment diary below  Assessment:  Patient presents without radiating pain today  He demonstrates mild hypomobility on the R at L4-5, but is doing much better overall  Tolerated treatment well  Patient would benefit from continued PT  Plan: Continue per plan of care        Precautions:  Hx of bladder CA; HTN    Manuals 7/15 7/21 7/28          UPAs R L4-S1 AZ AZ AZ          L sidelying mob AZ AZ AZ          Piriformis release AZ AZ AZ                       Neuro Re-Ed             TA isometrics                                                    Ther Ex             LTR 20x5s 20x5s 20x5s          SKTC 10x 10s 10x 10s 10x 10s          Sup piriformis str 3x30s 3x30s 3x30s          Sciatic n glide  10x b/l 10x b/l          Hamstring str sit  3x30s 3x30s          Standing gastroc str  3x30s 3x30s          Bridges   2x10                       Ther Activity                                       Gait Training                                       Modalities               10' 10'

## 2021-07-30 ENCOUNTER — OFFICE VISIT (OUTPATIENT)
Dept: PHYSICAL THERAPY | Facility: MEDICAL CENTER | Age: 71
End: 2021-07-30
Payer: MEDICARE

## 2021-07-30 DIAGNOSIS — M54.16 LUMBAR RADICULOPATHY: Primary | ICD-10-CM

## 2021-07-30 PROCEDURE — 97140 MANUAL THERAPY 1/> REGIONS: CPT | Performed by: PHYSICAL THERAPIST

## 2021-07-30 PROCEDURE — 97110 THERAPEUTIC EXERCISES: CPT | Performed by: PHYSICAL THERAPIST

## 2021-07-30 NOTE — PROGRESS NOTES
Daily Note     Today's date: 2021  Patient name: Santo Talley  : 1950  MRN: [id number]  Referring provider: Mildred Gordon PA-C  Dx:   Encounter Diagnosis     ICD-10-CM    1  Lumbar radiculopathy  M54 16                   Subjective:  Patient states that he is doing well  Objective: See treatment diary below  Assessment:  Patient presents without radiating symptoms into his R LE  Patient is doing well c/ exercise progressions  Tolerated treatment well  Patient would benefit from continued PT  Plan: Continue per plan of care        Precautions:  Hx of bladder CA; HTN    Manuals 7/15 7/21 7/28 7/30         UPAs R L4-S1 AZ AZ AZ AZ         L sidelying mob AZ AZ AZ AZ         Piriformis release AZ AZ AZ AZ                      Neuro Re-Ed             TA isometrics                                                    Ther Ex             LTR 20x5s 20x5s 20x5s 20x5s         SKTC 10x 10s 10x 10s 10x 10s 10x 10s         Sup piriformis str 3x30s 3x30s 3x30s 3x30s         Sciatic n glide  10x b/l 10x b/l 10x b/l         Hamstring str sit  3x30s 3x30s 3x30s         Standing gastroc str  3x30s 3x30s 3x30s         Bridges   2x10 3x10                      Ther Activity                                       Gait Training                                       Modalities               10' 10' 10'

## 2021-08-03 ENCOUNTER — OFFICE VISIT (OUTPATIENT)
Dept: PHYSICAL THERAPY | Facility: MEDICAL CENTER | Age: 71
End: 2021-08-03
Payer: MEDICARE

## 2021-08-03 DIAGNOSIS — M54.16 LUMBAR RADICULOPATHY: Primary | ICD-10-CM

## 2021-08-03 PROCEDURE — 97140 MANUAL THERAPY 1/> REGIONS: CPT | Performed by: PHYSICAL THERAPIST

## 2021-08-03 PROCEDURE — 97110 THERAPEUTIC EXERCISES: CPT | Performed by: PHYSICAL THERAPIST

## 2021-08-03 NOTE — PROGRESS NOTES
Daily Note     Today's date: 8/3/2021  Patient name: Bhumika Soler  : 1950  MRN: [id number]  Referring provider: Wing Mario Alberto PA-C  Dx:   Encounter Diagnosis     ICD-10-CM    1  Lumbar radiculopathy  M54 16                   Subjective:  Patient states that he is doing well  Objective: See treatment diary below  Assessment:  Patient presents c/ hypomobility at L4-5 on the right  He denies tingling/numbness into the L LE  Patient did well c/ exercise progressions without pain  Tolerated treatment well  Patient would benefit from continued PT  Plan: Continue per plan of care        Precautions:  Hx of bladder CA; HTN    Manuals 7/15 7/21 7/28 7/30 8/3        UPAs R L4-S1 AZ AZ AZ AZ AZ        L sidelying mob AZ AZ AZ AZ AZ        Piriformis release AZ AZ AZ AZ AZ                     Neuro Re-Ed             TA isometrics                                                    Ther Ex             LTR 20x5s 20x5s 20x5s 20x5s 20x5s        SKTC 10x 10s 10x 10s 10x 10s 10x 10s 10x 10s        Sup piriformis str 3x30s 3x30s 3x30s 3x30s 3x30s        Sciatic n glide  10x b/l 10x b/l 10x b/l 10x b/l        Hamstring str sit  3x30s 3x30s 3x30s 3x30s        Standing gastroc str  3x30s 3x30s 3x30s 3x30s        Bridges   2x10 3x10 3x10        Hip add     20x5s        Ther Activity                                       Gait Training                                       Modalities               10' 10' 10' 10'

## 2021-08-06 ENCOUNTER — OFFICE VISIT (OUTPATIENT)
Dept: PHYSICAL THERAPY | Facility: MEDICAL CENTER | Age: 71
End: 2021-08-06
Payer: MEDICARE

## 2021-08-06 DIAGNOSIS — M54.16 LUMBAR RADICULOPATHY: Primary | ICD-10-CM

## 2021-08-06 PROCEDURE — 97110 THERAPEUTIC EXERCISES: CPT | Performed by: PHYSICAL THERAPIST

## 2021-08-06 PROCEDURE — 97140 MANUAL THERAPY 1/> REGIONS: CPT | Performed by: PHYSICAL THERAPIST

## 2021-08-06 NOTE — PROGRESS NOTES
Daily Note     Today's date: 2021  Patient name: Aye Sahu  : 1950  MRN: [id number]  Referring provider: Luis Alfredo Serrano PA-C  Dx:   Encounter Diagnosis     ICD-10-CM    1  Lumbar radiculopathy  M54 16                   Subjective:  Patient states that he feels good  Objective: See treatment diary below  Assessment:  Patient presents without radicular symptoms today  He has mild lumbar stiffness on the right  Tolerated treatment well  Patient would benefit from continued PT  Plan: Continue per plan of care        Precautions:  Hx of bladder CA; HTN    Manuals 7/15 7/21 7/28 7/30 8/3 8/6       UPAs R L4-S1 AZ AZ AZ AZ AZ AZ       L sidelying mob AZ AZ AZ AZ AZ AZ       Piriformis release AZ AZ AZ AZ AZ AZ                    Neuro Re-Ed             TA isometrics                                                    Ther Ex             LTR 20x5s 20x5s 20x5s 20x5s 20x5s 20x5s       SKTC 10x 10s 10x 10s 10x 10s 10x 10s 10x 10s 10x 10s       Sup piriformis str 3x30s 3x30s 3x30s 3x30s 3x30s 3x30s       Sciatic n glide  10x b/l 10x b/l 10x b/l 10x b/l 10x b/l       Hamstring str sit  3x30s 3x30s 3x30s 3x30s 3x30s       Standing gastroc str  3x30s 3x30s 3x30s 3x30s 3x30s       Bridges   2x10 3x10 3x10 3x10       Hip add     20x5s 20x5s       Ther Activity                                       Gait Training                                       Modalities               10' 10' 10' 10' 10'

## 2021-08-10 ENCOUNTER — APPOINTMENT (OUTPATIENT)
Dept: PHYSICAL THERAPY | Facility: MEDICAL CENTER | Age: 71
End: 2021-08-10
Payer: MEDICARE

## 2021-08-10 DIAGNOSIS — M25.559 HIP PAIN: ICD-10-CM

## 2021-08-10 DIAGNOSIS — M54.16 LUMBAR RADICULOPATHY: ICD-10-CM

## 2021-08-10 RX ORDER — MELOXICAM 15 MG/1
TABLET ORAL
Qty: 30 TABLET | Refills: 1 | Status: SHIPPED | OUTPATIENT
Start: 2021-08-10 | End: 2021-10-04

## 2021-08-11 ENCOUNTER — APPOINTMENT (OUTPATIENT)
Dept: PHYSICAL THERAPY | Facility: MEDICAL CENTER | Age: 71
End: 2021-08-11
Payer: MEDICARE

## 2021-08-12 ENCOUNTER — APPOINTMENT (OUTPATIENT)
Dept: PHYSICAL THERAPY | Facility: MEDICAL CENTER | Age: 71
End: 2021-08-12
Payer: MEDICARE

## 2021-08-19 ENCOUNTER — OFFICE VISIT (OUTPATIENT)
Dept: PHYSICAL THERAPY | Facility: MEDICAL CENTER | Age: 71
End: 2021-08-19
Payer: MEDICARE

## 2021-08-19 DIAGNOSIS — M54.16 LUMBAR RADICULOPATHY: Primary | ICD-10-CM

## 2021-08-19 PROCEDURE — 97140 MANUAL THERAPY 1/> REGIONS: CPT | Performed by: PHYSICAL THERAPIST

## 2021-08-19 PROCEDURE — 97110 THERAPEUTIC EXERCISES: CPT | Performed by: PHYSICAL THERAPIST

## 2021-08-19 NOTE — PROGRESS NOTES
Daily Note     Today's date: 2021  Patient name: Ashish Finley  : 1950  MRN: [id number]  Referring provider: Valeria Ragsdale PA-C  Dx:   Encounter Diagnosis     ICD-10-CM    1  Lumbar radiculopathy  M54 16                   Subjective:  Patient states that he is doing okay  Objective: See treatment diary below  Assessment:  Patient presents without radiating pain  He has mild hypomobility on the R at L4-5, but is feeling better overall  Tolerated treatment well  Patient would benefit from continued PT  Plan: Continue per plan of care        Precautions:  Hx of bladder CA; HTN    Manuals 7/15 7/21 7/28 7/30 8/3 8/6 8/19      UPAs R L4-S1 AZ AZ AZ AZ AZ AZ AZ      L sidelying mob AZ AZ AZ AZ AZ AZ AZ      Piriformis release AZ AZ AZ AZ AZ AZ AZ                   Neuro Re-Ed             TA isometrics                                                    Ther Ex             LTR 20x5s 20x5s 20x5s 20x5s 20x5s 20x5s 30x      SKTC 10x 10s 10x 10s 10x 10s 10x 10s 10x 10s 10x 10s 10x 10s      Sup piriformis str 3x30s 3x30s 3x30s 3x30s 3x30s 3x30s 3x30s      Sciatic n glide  10x b/l 10x b/l 10x b/l 10x b/l 10x b/l 10x b/l      Hamstring str sit  3x30s 3x30s 3x30s 3x30s 3x30s 3x30s      Standing gastroc str  3x30s 3x30s 3x30s 3x30s 3x30s 3x30s      Bridges   2x10 3x10 3x10 3x10 3x10      Hip add     20x5s 20x5s 20x5s      Sup hip abd       20x5s blue      Ther Activity                                       Gait Training                                       Modalities               10' 10' 10' 10' 10' 10'

## 2021-10-04 DIAGNOSIS — M54.16 LUMBAR RADICULOPATHY: ICD-10-CM

## 2021-10-04 DIAGNOSIS — M25.559 HIP PAIN: ICD-10-CM

## 2021-10-04 RX ORDER — MELOXICAM 15 MG/1
TABLET ORAL
Qty: 30 TABLET | Refills: 1 | Status: SHIPPED | OUTPATIENT
Start: 2021-10-04 | End: 2022-01-10

## 2021-11-15 ENCOUNTER — PROCEDURE VISIT (OUTPATIENT)
Dept: UROLOGY | Facility: MEDICAL CENTER | Age: 71
End: 2021-11-15
Payer: MEDICARE

## 2021-11-15 VITALS
HEIGHT: 67 IN | SYSTOLIC BLOOD PRESSURE: 136 MMHG | BODY MASS INDEX: 27.94 KG/M2 | WEIGHT: 178 LBS | DIASTOLIC BLOOD PRESSURE: 78 MMHG

## 2021-11-15 DIAGNOSIS — N40.1 BENIGN PROSTATIC HYPERPLASIA WITH URINARY OBSTRUCTION: ICD-10-CM

## 2021-11-15 DIAGNOSIS — N13.8 BENIGN PROSTATIC HYPERPLASIA WITH URINARY OBSTRUCTION: ICD-10-CM

## 2021-11-15 DIAGNOSIS — Z85.51 PERSONAL HISTORY OF BLADDER CANCER: Primary | ICD-10-CM

## 2021-11-15 PROCEDURE — 81003 URINALYSIS AUTO W/O SCOPE: CPT | Performed by: UROLOGY

## 2021-11-15 PROCEDURE — 99214 OFFICE O/P EST MOD 30 MIN: CPT | Performed by: UROLOGY

## 2021-11-15 PROCEDURE — 52000 CYSTOURETHROSCOPY: CPT | Performed by: UROLOGY

## 2021-12-21 DIAGNOSIS — M65.30 TRIGGER FINGER, UNSPECIFIED FINGER, UNSPECIFIED LATERALITY: Primary | ICD-10-CM

## 2022-01-10 ENCOUNTER — OFFICE VISIT (OUTPATIENT)
Dept: OBGYN CLINIC | Facility: HOSPITAL | Age: 72
End: 2022-01-10
Payer: MEDICARE

## 2022-01-10 VITALS
WEIGHT: 179 LBS | SYSTOLIC BLOOD PRESSURE: 153 MMHG | HEART RATE: 66 BPM | HEIGHT: 67 IN | DIASTOLIC BLOOD PRESSURE: 53 MMHG | BODY MASS INDEX: 28.09 KG/M2

## 2022-01-10 DIAGNOSIS — M65.30 TRIGGER FINGER, UNSPECIFIED FINGER, UNSPECIFIED LATERALITY: ICD-10-CM

## 2022-01-10 PROCEDURE — 99203 OFFICE O/P NEW LOW 30 MIN: CPT | Performed by: PHYSICIAN ASSISTANT

## 2022-01-10 NOTE — PROGRESS NOTES
Assessment:    Trigger finger, left fifth finger      Plan:    Alumafoam splinting at night   Recommend over-the-counter ibuprofen to decrease inflammation   ROM exercises encouraged  Patient prefers to try these modalities prior to receiving injection to the area  Follow-up 6 weeks for re-evaluation  Problem List Items Addressed This Visit        Musculoskeletal and Integument    Trigger finger                   Subjective:     Patient ID:  Jose Pendleton is a 70 y o  male    HPI    The patient is a 66-year-old male presenting for evaluation of his left small finger  According to the patient, he has roughly a 5 to 6 week history of left fifth finger locking and catching which is noticeable when he wakes up in the morning  Throughout the day it does not really do this all that much  It is minimally painful to him  There was no injury or trauma to the area  No associated numbness and tingling  This has never happened to him before  He has not done any formal treatments for this as of yet  The following portions of the patient's history were reviewed and updated as appropriate: allergies, current medications, past family history, past medical history, past social history, past surgical history and problem list     Review of Systems     Objective:    Imaging:  None      Vitals:    01/10/22 1111   BP: 153/53   Pulse: 66           Physical Exam     Orthopedic Examination:  Left 5th finger    Inspection:  No open wounds or erythema  No effusion  No ecchymosis  Palpation:  No palmar tenderness to palpation at the A1 pulley  Range-of-motion:   The fifth digit is locked in the flex position which is easily correctable    Strength:  5/5  strength    Sensation:  Intact median radial ulnar nerve distribution    Palpable radial pulse

## 2022-03-09 ENCOUNTER — APPOINTMENT (OUTPATIENT)
Dept: LAB | Facility: MEDICAL CENTER | Age: 72
End: 2022-03-09
Payer: MEDICARE

## 2022-03-09 ENCOUNTER — OFFICE VISIT (OUTPATIENT)
Dept: OBGYN CLINIC | Facility: MEDICAL CENTER | Age: 72
End: 2022-03-09
Payer: MEDICARE

## 2022-03-09 VITALS
HEART RATE: 60 BPM | WEIGHT: 175 LBS | SYSTOLIC BLOOD PRESSURE: 150 MMHG | DIASTOLIC BLOOD PRESSURE: 84 MMHG | BODY MASS INDEX: 26.52 KG/M2 | HEIGHT: 68 IN

## 2022-03-09 DIAGNOSIS — M65.352 TRIGGER LITTLE FINGER OF LEFT HAND: Primary | ICD-10-CM

## 2022-03-09 DIAGNOSIS — E03.9 ACQUIRED HYPOTHYROIDISM: ICD-10-CM

## 2022-03-09 LAB
CHOLEST SERPL-MCNC: 192 MG/DL
HDLC SERPL-MCNC: 52 MG/DL
LDLC SERPL CALC-MCNC: 123 MG/DL (ref 0–100)
NONHDLC SERPL-MCNC: 140 MG/DL
TRIGL SERPL-MCNC: 83 MG/DL
TSH SERPL DL<=0.05 MIU/L-ACNC: 3.48 UIU/ML (ref 0.36–3.74)

## 2022-03-09 PROCEDURE — 99212 OFFICE O/P EST SF 10 MIN: CPT | Performed by: EMERGENCY MEDICINE

## 2022-03-09 PROCEDURE — 36415 COLL VENOUS BLD VENIPUNCTURE: CPT

## 2022-03-09 PROCEDURE — 20550 NJX 1 TENDON SHEATH/LIGAMENT: CPT | Performed by: EMERGENCY MEDICINE

## 2022-03-09 PROCEDURE — 84443 ASSAY THYROID STIM HORMONE: CPT

## 2022-03-09 PROCEDURE — 80061 LIPID PANEL: CPT

## 2022-03-09 RX ORDER — BETAMETHASONE SODIUM PHOSPHATE AND BETAMETHASONE ACETATE 3; 3 MG/ML; MG/ML
3 INJECTION, SUSPENSION INTRA-ARTICULAR; INTRALESIONAL; INTRAMUSCULAR; SOFT TISSUE
Status: COMPLETED | OUTPATIENT
Start: 2022-03-09 | End: 2022-03-09

## 2022-03-09 RX ORDER — LIDOCAINE HYDROCHLORIDE 10 MG/ML
0.5 INJECTION, SOLUTION INFILTRATION; PERINEURAL
Status: COMPLETED | OUTPATIENT
Start: 2022-03-09 | End: 2022-03-09

## 2022-03-09 RX ADMIN — LIDOCAINE HYDROCHLORIDE 0.5 ML: 10 INJECTION, SOLUTION INFILTRATION; PERINEURAL at 09:24

## 2022-03-09 RX ADMIN — BETAMETHASONE SODIUM PHOSPHATE AND BETAMETHASONE ACETATE 3 MG: 3; 3 INJECTION, SUSPENSION INTRA-ARTICULAR; INTRALESIONAL; INTRAMUSCULAR; SOFT TISSUE at 09:24

## 2022-03-09 NOTE — PROGRESS NOTES
Assessment/Plan:    Diagnoses and all orders for this visit:    Trigger little finger of left hand  -     Hand/upper extremity injection: L small A1        Return in about 2 weeks (around 3/23/2022)  Chief Complaint:     Chief Complaint   Patient presents with    Left Hand - Pain       Subjective:   Patient ID: Sarah Melgoza is a 70 y o  male  Patient returns with worsening symptoms of left 5th trigger finger      Review of Systems    The following portions of the patient's chart were reviewed and updated as appropriate:    Allergy:  No Known Allergies      Past Medical History:   Diagnosis Date    Anemia     Bladder cancer ([facility] [address] )     BPH with obstruction/lower urinary tract symptoms     Depression     Erectile dysfunction     Gross hematuria     Hypertension     Hypothyroid     Malignant neoplasm of lateral wall of urinary bladder (HCC)     Nocturia        Past Surgical History:   Procedure Laterality Date    BLADDER SURGERY  2002    bladder cancer surgery    CHOLECYSTECTOMY      Laparoscopic, age 36 something, per allscripts    CYSTECTOMY, PARTIAL      CYSTOSCOPY      w/ biopsy    CYSTOSCOPY  ,,,    KNEE ARTHROSCOPY      Therapeutic, Last assessed: 16    MOUTH SURGERY  2020       Social History     Socioeconomic History    Marital status: /Civil Union     Spouse name: Not on file    Number of children: Not on file    Years of education: Not on file    Highest education level: Not on file   Occupational History    Not on file   Tobacco Use    Smoking status: Former Smoker     Quit date: 1990     Years since quittin 5    Smokeless tobacco: Never Used    Tobacco comment: quit in , per allscripts, No secondhand smoke exposure, per allscripts   Vaping Use    Vaping Use: Never used   Substance and Sexual Activity    Alcohol use: No    Drug use: No    Sexual activity: Not on file   Other Topics Concern    Not on file   Social History Narrative    Activities, leisure reading books    Recreational activities Running         Social Determinants of Health     Financial Resource Strain: Not on file   Food Insecurity: Not on file   Transportation Needs: Not on file   Physical Activity: Not on file   Stress: Not on file   Social Connections: Not on file   Intimate Partner Violence: Not on file   Housing Stability: Not on file       Family History   Problem Relation Age of Onset    Alzheimer's disease Mother     Breast cancer Mother     Anxiety disorder Mother         generalized    Stroke Mother         syndrome    Cancer Brother         bladder    Prostate cancer Brother     Hypertension Family     Cancer Family     Alzheimer's disease Father     Hypertension Father        Medications:    Current Outpatient Medications:     ALPRAZolam (XANAX) 0 25 mg tablet, alprazolam 0 25 mg tablet, Disp: , Rfl:     buPROPion (WELLBUTRIN SR) 150 mg 12 hr tablet, Take 150 mg by mouth 2 (two) times a day, Disp: , Rfl: 0    escitalopram (LEXAPRO) 5 mg tablet, Take 1 tablet by mouth daily, Disp: , Rfl:     lamoTRIgine (LAMICTAL) 200 MG tablet, Take 1 tablet by mouth 2 (two) times a day, Disp: , Rfl:     levothyroxine 125 mcg tablet, TAKE 1 TABLET BY MOUTH EVERY DAY, Disp: 90 tablet, Rfl: 3    losartan (COZAAR) 50 mg tablet, TAKE 1 TABLET BY MOUTH EVERY DAY, Disp: 90 tablet, Rfl: 3    traZODone (DESYREL) 50 mg tablet, Take 50 mg by mouth  , Disp: , Rfl:     Patient Active Problem List   Diagnosis    Anxiety    Bladder cancer ([facility] [address]    Depression    GERD without esophagitis    Hyperglycemia    Essential hypertension    Acquired hypothyroidism    Abnormal EKG    Cataract    Chest pain on exertion    Double vision    Equivocal stress test    Neck pain    Skin lump of leg, left    Benign prostatic hyperplasia with urinary obstruction    Osteoarthritis of knee    Medicare annual wellness visit, subsequent    Personal history of bladder cancer    Hip pain    Lumbar radiculopathy    Trigger finger       Objective:  /84   Pulse 60   Ht 5' 8" (1 727 m)   Wt 79 4 kg (175 lb)   BMI 26 61 kg/m²     Left Hand Exam     Other   Erythema: absent  Sensation: normal  Pulse: present    Comments:  Trigger 5th digit  Normal inspection  ttp A1            Physical Exam      Neurologic Exam    Hand/upper extremity injection: L small A1  Universal Protocol:  Consent: Verbal consent obtained  Risks and benefits: risks, benefits and alternatives were discussed  Consent given by: patient  Time out: Immediately prior to procedure a "time out" was called to verify the correct patient, procedure, equipment, support staff and site/side marked as required    Timeout called at: 3/9/2022 9:23 AM   Patient understanding: patient states understanding of the procedure being performed  Patient identity confirmed: verbally with patient    Supporting Documentation  Indications: pain and tendon swelling   Procedure Details  Condition:trigger finger Location: small finger - L small A1   Preparation: Patient was prepped and draped in the usual sterile fashion  Needle size: 25 G  Ultrasound guidance: no  Approach: volar  Medications administered: 0 5 mL lidocaine 1 %; 3 mg betamethasone acetate-betamethasone sodium phosphate 6 (3-3) mg/mL    Patient tolerance: patient tolerated the procedure well with no immediate complications  Dressing:  Sterile dressing applied

## 2022-03-30 ENCOUNTER — RA CDI HCC (OUTPATIENT)
Dept: OTHER | Facility: HOSPITAL | Age: 72
End: 2022-03-30

## 2022-03-30 NOTE — PROGRESS NOTES
[id number]   [facility]  coding opportunities          Chart Reviewed number of suggestions sent to Provider: 1     Patients Insurance     Medicare Insurance: [employer]

## 2022-04-06 ENCOUNTER — OFFICE VISIT (OUTPATIENT)
Dept: FAMILY MEDICINE CLINIC | Facility: CLINIC | Age: 72
End: 2022-04-06
Payer: MEDICARE

## 2022-04-06 VITALS
HEART RATE: 54 BPM | WEIGHT: 182 LBS | HEIGHT: 68 IN | DIASTOLIC BLOOD PRESSURE: 76 MMHG | BODY MASS INDEX: 27.58 KG/M2 | OXYGEN SATURATION: 97 % | SYSTOLIC BLOOD PRESSURE: 138 MMHG

## 2022-04-06 DIAGNOSIS — R05.9 COUGH: ICD-10-CM

## 2022-04-06 DIAGNOSIS — F33.42 MAJOR DEPRESSIVE DISORDER, RECURRENT, IN FULL REMISSION (HCC): ICD-10-CM

## 2022-04-06 DIAGNOSIS — I10 ESSENTIAL HYPERTENSION: Primary | ICD-10-CM

## 2022-04-06 DIAGNOSIS — Z00.00 MEDICARE ANNUAL WELLNESS VISIT, SUBSEQUENT: ICD-10-CM

## 2022-04-06 DIAGNOSIS — E03.9 ACQUIRED HYPOTHYROIDISM: ICD-10-CM

## 2022-04-06 DIAGNOSIS — R73.9 HYPERGLYCEMIA: ICD-10-CM

## 2022-04-06 PROBLEM — R07.9 CHEST PAIN ON EXERTION: Status: RESOLVED | Noted: 2017-08-15 | Resolved: 2022-04-06

## 2022-04-06 PROCEDURE — 1123F ACP DISCUSS/DSCN MKR DOCD: CPT | Performed by: PHYSICIAN ASSISTANT

## 2022-04-06 PROCEDURE — 99214 OFFICE O/P EST MOD 30 MIN: CPT | Performed by: PHYSICIAN ASSISTANT

## 2022-04-06 RX ORDER — FLUTICASONE PROPIONATE 50 MCG
1 SPRAY, SUSPENSION (ML) NASAL DAILY
Qty: 11.1 ML | Refills: 11 | Status: SHIPPED | OUTPATIENT
Start: 2022-04-06

## 2022-04-06 NOTE — PATIENT INSTRUCTIONS
Problem List Items Addressed This Visit        Endocrine    Acquired hypothyroidism     TSH within normal limits continue levothyroxine 125 mcg  Relevant Orders    TSH, 3rd generation with Free T4 reflex       Cardiovascular and Mediastinum    Essential hypertension - Primary     Blood pressure is stable on Cozaar  Other    Major depressive disorder, recurrent, in full remission ([facility] [address] )     Continue to see Psychiatry  Hyperglycemia     CMP A1c was not done  Relevant Orders    Comprehensive metabolic panel    Hemoglobin A1C    Medicare annual wellness visit, subsequent     COVID pneumonia vaccines up-to-date for shingle vaccine in the new series has been administered  Blood work up-to-date except for fasting sugar will check A1c today fingerstick  Blue pack 5 wishes given the past patient encouraged bringing his living well to put under his ACP document tab  Cough     PT is a past smoker so CXR is ordered  IN the past pt was changed from ACE to ARB and this did help tremendously  Now pt feels cough is due to PND  Trial flonase as directed  DO not recommend daily oral antihistamine due to BPH issues            Relevant Medications    fluticasone (FLONASE) 50 mcg/act nasal spray    Other Relevant Orders    XR chest pa & lateral

## 2022-04-06 NOTE — PROGRESS NOTES
Assessment and Plan:    Problem List Items Addressed This Visit        Endocrine    Acquired hypothyroidism     TSH within normal limits continue levothyroxine 125 mcg  Relevant Orders    TSH, 3rd generation with Free T4 reflex       Cardiovascular and Mediastinum    Essential hypertension - Primary     Blood pressure is stable on Cozaar  Other    Major depressive disorder, recurrent, in full remission ([facility] )     Continue to see Psychiatry  Hyperglycemia     CMP A1c was not done  Relevant Orders    Comprehensive metabolic panel    Hemoglobin A1C    Medicare annual wellness visit, subsequent     COVID pneumonia vaccines up-to-date for shingle vaccine in the new series has been administered  Blood work up-to-date except for fasting sugar will check A1c today fingerstick  Blue pack 5 wishes given the past patient encouraged bringing his living well to put under his ACP document tab  Cough     PT is a past smoker so CXR is ordered  IN the past pt was changed from ACE to ARB and this did help tremendously  Now pt feels cough is due to PND  Trial flonase as directed  DO not recommend daily oral antihistamine due to BPH issues  Relevant Medications    fluticasone (FLONASE) 50 mcg/act nasal spray    Other Relevant Orders    XR chest pa & lateral                 Diagnoses and all orders for this visit:    Essential hypertension    Acquired hypothyroidism  -     TSH, 3rd generation with Free T4 reflex; Future    Medicare annual wellness visit, subsequent    Major depressive disorder, recurrent, in full remission ([facility] )    Hyperglycemia  -     Comprehensive metabolic panel; Future  -     Hemoglobin A1C; Future    Cough  -     XR chest pa & lateral; Future  -     fluticasone (FLONASE) 50 mcg/act nasal spray; 1 spray into each nostril daily              Subjective:      Patient ID: Angely Araujo is a 70 y o  male      CC:    Chief Complaint   Patient presents with   [facility] Wellness Visit     Patient present today for his Annual Wellness Visit   Cough     pt complaints of possible chronic productive cough for a very long time  HPI:      Jane Mary is here for chronic conditions f/u including the diagnosis of No diagnosis found    Pt  states they are taking all medications as directed without complaints or side effects  Pt  had labs done prior to today's visit which included        The following portions of the patient's history were reviewed and updated as appropriate: allergies, current medications, past family history, past medical history, past social history, past surgical history and problem list       Review of Systems   Constitutional: Negative  HENT: Negative  Eyes: Negative  Respiratory: Negative  Cardiovascular: Negative  Gastrointestinal: Negative  Endocrine: Negative  Genitourinary: Negative  Musculoskeletal: Negative  Skin: Negative  Allergic/Immunologic: Negative  Neurological: Negative  Hematological: Negative  Psychiatric/Behavioral: Negative  Data to review:       Objective:    Vitals:    04/06/22 1040   BP: 138/76   BP Location: Right arm   Patient Position: Sitting   Cuff Size: Large   Pulse: (!) 54   SpO2: 97%   Weight: 82 6 kg (182 lb)   Height: 5' 8" (1 727 m)        Physical Exam  Vitals and nursing note reviewed  Constitutional:       Appearance: Normal appearance  HENT:      Head: Normocephalic and atraumatic  Eyes:      General: Lids are normal       Conjunctiva/sclera: Conjunctivae normal       Pupils: Pupils are equal, round, and reactive to light  Cardiovascular:      Rate and Rhythm: Normal rate and regular rhythm  Heart sounds: Normal heart sounds  Pulmonary:      Effort: Pulmonary effort is normal       Breath sounds: Normal breath sounds  Skin:     General: Skin is warm and dry  Neurological:      General: No focal deficit present  Mental Status: He is alert   Mental status is at baseline  Psychiatric:         Mood and Affect: Mood normal          Behavior: Behavior normal          Thought Content: Thought content normal          Judgment: Judgment normal            BMI Counseling: Body mass index is 27 67 kg/m²  The BMI is above normal  Nutrition recommendations include decreasing portion sizes, encouraging healthy choices of fruits and vegetables, decreasing fast food intake, consuming healthier snacks, limiting drinks that contain sugar, moderation in carbohydrate intake, increasing intake of lean protein, reducing intake of saturated and trans fat and reducing intake of cholesterol  Exercise recommendations include exercising 3-5 times per week  No pharmacotherapy was ordered  Rationale for BMI follow-up plan is due to patient being overweight or obese

## 2022-04-06 NOTE — PROGRESS NOTES
Assessment and Plan:     Problem List Items Addressed This Visit        Endocrine    Acquired hypothyroidism     TSH within normal limits continue levothyroxine 125 mcg  Relevant Orders    TSH, 3rd generation with Free T4 reflex       Cardiovascular and Mediastinum    Essential hypertension - Primary     Blood pressure is stable on Cozaar  Other    Major depressive disorder, recurrent, in full remission ([facility] [address] )     Continue to see Psychiatry  Hyperglycemia     CMP A1c was not done  Relevant Orders    Comprehensive metabolic panel    Hemoglobin A1C    Medicare annual wellness visit, subsequent     COVID pneumonia vaccines up-to-date for shingle vaccine in the new series has been administered  Blood work up-to-date except for fasting sugar will check A1c today fingerstick  Blue pack 5 wishes given the past patient encouraged bringing his living well to put under his ACP document tab  Cough     PT is a past smoker so CXR is ordered  IN the past pt was changed from ACE to ARB and this did help tremendously  Now pt feels cough is due to PND  Trial flonase as directed  DO not recommend daily oral antihistamine due to BPH issues  Relevant Medications    fluticasone (FLONASE) 50 mcg/act nasal spray    Other Relevant Orders    XR chest pa & lateral           Preventive health issues were discussed with patient, and age appropriate screening tests were ordered as noted in patient's After Visit Summary  Personalized health advice and appropriate referrals for health education or preventive services given if needed, as noted in patient's After Visit Summary       History of Present Illness:     Patient presents for Medicare Annual Wellness visit    Patient Care Team:  Urbano Call PA-C as PCP - General (Family Medicine)  MD Urbano Sandhu PA-C     Problem List:     Patient Active Problem List   Diagnosis    Anxiety    Bladder cancer ([facility] [address] )    Major depressive disorder, recurrent, in full remission ([facility] )    GERD without esophagitis    Hyperglycemia    Essential hypertension    Acquired hypothyroidism    Abnormal EKG    Equivocal stress test    Benign prostatic hyperplasia with urinary obstruction    Osteoarthritis of knee    Medicare annual wellness visit, subsequent    Personal history of bladder cancer    Hip pain    Lumbar radiculopathy    Trigger finger    Cough      Past Medical and Surgical History:     Past Medical History:   Diagnosis Date    Anemia     Bladder cancer ([facility] )     BPH with obstruction/lower urinary tract symptoms     Depression     Erectile dysfunction     Gross hematuria     Hypertension     Hypothyroid     Malignant neoplasm of lateral wall of urinary bladder (HCC)     Nocturia      Past Surgical History:   Procedure Laterality Date    BLADDER SURGERY  2002    bladder cancer surgery    CHOLECYSTECTOMY      Laparoscopic, age 36 something, per allscripts    CYSTECTOMY, PARTIAL      CYSTOSCOPY      w/ biopsy    CYSTOSCOPY  ,,,    KNEE ARTHROSCOPY      Therapeutic, Last assessed: 16    MOUTH SURGERY  2020      Family History:     Family History   Problem Relation Age of Onset    Alzheimer's disease Mother     Breast cancer Mother     Anxiety disorder Mother         generalized    Stroke Mother         syndrome    Cancer Brother         bladder    Prostate cancer Brother     Hypertension Family     Cancer Family     Alzheimer's disease Father     Hypertension Father       Social History:     Social History     Socioeconomic History    Marital status: /Civil Union     Spouse name: None    Number of children: None    Years of education: None    Highest education level: None   Occupational History    None   Tobacco Use    Smoking status: Former Smoker     Quit date: 1990     Years since quittin 6    Smokeless tobacco: Never Used    Tobacco comment: quit in 1983, per allscripts, No secondhand smoke exposure, per allscripts   Vaping Use    Vaping Use: Never used   Substance and Sexual Activity    Alcohol use: No    Drug use: No    Sexual activity: None   Other Topics Concern    None   Social History Narrative    Activities, leisure reading books    Recreational activities Running         Social Determinants of Health     Financial Resource Strain: Not on file   Food Insecurity: Not on file   Transportation Needs: Not on file   Physical Activity: Not on file   Stress: Not on file   Social Connections: Not on file   Intimate Partner Violence: Not on file   Housing Stability: Not on file      Medications and Allergies:     Current Outpatient Medications   Medication Sig Dispense Refill    ALPRAZolam (XANAX) 0 25 mg tablet alprazolam 0 25 mg tablet      buPROPion (WELLBUTRIN SR) 150 mg 12 hr tablet Take 150 mg by mouth 2 (two) times a day  0    escitalopram (LEXAPRO) 5 mg tablet Take 1 tablet by mouth daily      lamoTRIgine (LAMICTAL) 200 MG tablet Take 1 tablet by mouth 2 (two) times a day      levothyroxine 125 mcg tablet TAKE 1 TABLET BY MOUTH EVERY DAY 90 tablet 3    losartan (COZAAR) 50 mg tablet TAKE 1 TABLET BY MOUTH EVERY DAY 90 tablet 3    traZODone (DESYREL) 50 mg tablet Take 50 mg by mouth        fluticasone (FLONASE) 50 mcg/act nasal spray 1 spray into each nostril daily 11 1 mL 11     No current facility-administered medications for this visit       No Known Allergies   Immunizations:     Immunization History   Administered Date(s) Administered    COVID-19 MODERNA VACC 0 5 ML IM 01/27/2021, 03/04/2021, 10/28/2021, 04/02/2022    H1N1, All Formulations 10/26/2009    Hep A, ped/adol, 2 dose 10/24/2013    INFLUENZA 11/09/2016, 11/21/2018, 11/08/2019, 12/01/2020, 12/01/2020, 10/22/2021    Influenza Split High Dose Preservative Free IM 11/09/2016, 12/07/2017, 11/14/2018, 11/08/2019    Influenza, high dose seasonal 0 7 mL 11/08/2019  Pneumococcal Conjugate 13-Valent 07/12/2018    Pneumococcal Polysaccharide PPV23 12/28/2015    Tdap 12/07/2017    Typhoid Live, oral 10/24/2013    Yellow Fever 10/24/2013    Zoster 08/27/2013    Zoster Vaccine Recombinant 02/09/2022      Health Maintenance:         Topic Date Due    Hepatitis C Screening  Never done    Colorectal Cancer Screening  10/08/2023     There are no preventive care reminders to display for this patient  Medicare Health Risk Assessment:     /76 (BP Location: Right arm, Patient Position: Sitting, Cuff Size: Large)   Pulse (!) 54   Ht 5' 8" (1 727 m)   Wt 82 6 kg (182 lb)   SpO2 97%   BMI 27 67 kg/m²      Enzo Morgan is here for his Subsequent Wellness visit  Health Risk Assessment:   Patient rates overall health as good  Patient feels that their physical health rating is same  Patient is satisfied with their life  Eyesight was rated as slightly worse  Hearing was rated as same  Patient feels that their emotional and mental health rating is same  Patients states they are sometimes angry  Patient states they are never, rarely unusually tired/fatigued  Pain experienced in the last 7 days has been some  Patient's pain rating has been 7/10  Patient states that he has experienced no weight loss or gain in last 6 months  Bilateral hip pain    Depression Screening:   PHQ-9 Score: 3      Fall Risk Screening: In the past year, patient has experienced: no history of falling in past year      Home Safety:  Patient has trouble with stairs inside or outside of their home  Patient has working smoke alarms and has working carbon monoxide detector  Home safety hazards include: loose rugs on the floor  Nutrition:   Current diet is Regular  Medications:   Patient is not currently taking any over-the-counter supplements  Patient is able to manage medications       Activities of Daily Living (ADLs)/Instrumental Activities of Daily Living (IADLs):   Walk and transfer into and out of bed and chair?: Yes  Dress and groom yourself?: Yes    Bathe or shower yourself?: Yes    Feed yourself?  Yes  Do your laundry/housekeeping?: Yes  Manage your money, pay your bills and track your expenses?: Yes  Make your own meals?: Yes    Do your own shopping?: Yes    Previous Hospitalizations:   Any hospitalizations or ED visits within the last 12 months?: No      Advance Care Planning:     Five wishes given: Yes      Cognitive Screening:   Provider or family/friend/caregiver concerned regarding cognition?: No    PREVENTIVE SCREENINGS      Cardiovascular Screening:    General: Screening Current      Diabetes Screening:     General: Screening Current      Colorectal Cancer Screening:     General: Screening Current      Prostate Cancer Screening:    General: Screening Current      Osteoporosis Screening:    General: Screening Not Indicated      Abdominal Aortic Aneurysm (AAA) Screening:    Risk factors include: age between 73-67 yo and tobacco use        General: Screening Not Indicated      Lung Cancer Screening:     General: Screening Not Indicated      Hepatitis C Screening:    General: Screening Not Indicated      Sharlette Habermann, PA-C

## 2022-04-06 NOTE — ASSESSMENT & PLAN NOTE
PT is a past smoker so CXR is ordered  IN the past pt was changed from ACE to ARB and this did help tremendously  Now pt feels cough is due to PND  Trial flonase as directed  DO not recommend daily oral antihistamine due to BPH issues

## 2022-04-06 NOTE — ASSESSMENT & PLAN NOTE
COVID pneumonia vaccines up-to-date for shingle vaccine in the new series has been administered  Blood work up-to-date except for fasting sugar will check A1c today fingerstick  Blue pack 5 wishes given the past patient encouraged bringing his living well to put under his ACP document tab

## 2022-07-27 DIAGNOSIS — E03.9 ACQUIRED HYPOTHYROIDISM: ICD-10-CM

## 2022-07-27 RX ORDER — LEVOTHYROXINE SODIUM 0.12 MG/1
TABLET ORAL
Qty: 90 TABLET | Refills: 3 | Status: SHIPPED | OUTPATIENT
Start: 2022-07-27 | End: 2022-08-24 | Stop reason: SDUPTHER

## 2022-08-17 ENCOUNTER — OFFICE VISIT (OUTPATIENT)
Dept: OBGYN CLINIC | Facility: MEDICAL CENTER | Age: 72
End: 2022-08-17
Payer: MEDICARE

## 2022-08-17 VITALS
WEIGHT: 180 LBS | BODY MASS INDEX: 27.28 KG/M2 | HEART RATE: 70 BPM | DIASTOLIC BLOOD PRESSURE: 75 MMHG | HEIGHT: 68 IN | SYSTOLIC BLOOD PRESSURE: 127 MMHG

## 2022-08-17 DIAGNOSIS — M65.352 TRIGGER LITTLE FINGER OF LEFT HAND: Primary | ICD-10-CM

## 2022-08-17 PROCEDURE — 99212 OFFICE O/P EST SF 10 MIN: CPT | Performed by: EMERGENCY MEDICINE

## 2022-08-17 NOTE — PROGRESS NOTES
Assessment/Plan:    Diagnoses and all orders for this visit:    Trigger little finger of left hand  -     Ambulatory Referral to Orthopedic Surgery; Future    Referred to orthopedic surgeon to discuss trigger finger release  Discussed OTC splinting  Declines steroid injection at this time    No follow-ups on file  Chief Complaint:     Chief Complaint   Patient presents with    Left Hand - Follow-up    Left Little Finger - Follow-up       Subjective:   Patient ID: Martín Covarrubias is a 70 y o  male  Patient returns with return of triggering and pain of the left finger  He has previously undergone splinting as well as a steroid injection at this point in time he is looking for a more permanent fix      Review of Systems    The following portions of the patient's chart were reviewed and updated as appropriate:    Allergy:  No Known Allergies      Past Medical History:   Diagnosis Date    Anemia     Bladder cancer ([address] )     BPH with obstruction/lower urinary tract symptoms     Depression     Erectile dysfunction     Gross hematuria     Hypertension     Hypothyroid     Malignant neoplasm of lateral wall of urinary bladder (HCC)     Nocturia        Past Surgical History:   Procedure Laterality Date    BLADDER SURGERY  2002    bladder cancer surgery    CHOLECYSTECTOMY      Laparoscopic, age 36 something, per allscripts    CYSTECTOMY, PARTIAL      CYSTOSCOPY      w/ biopsy    CYSTOSCOPY  ,,,    KNEE ARTHROSCOPY      Therapeutic, Last assessed: 16    MOUTH SURGERY  2020       Social History     Socioeconomic History    Marital status: /Civil Union     Spouse name: Not on file    Number of children: Not on file    Years of education: Not on file    Highest education level: Not on file   Occupational History    Not on file   Tobacco Use    Smoking status: Former Smoker     Quit date: 1990     Years since quittin 0    Smokeless tobacco: Never Used    Tobacco comment: quit in 1983, per allscripts, No secondhand smoke exposure, per allscripts   Vaping Use    Vaping Use: Never used   Substance and Sexual Activity    Alcohol use: No    Drug use: No    Sexual activity: Not on file   Other Topics Concern    Not on file   Social History Narrative    Activities, leisure reading books    Recreational activities Running         Social Determinants of Health     Financial Resource Strain: Not on file   Food Insecurity: Not on file   Transportation Needs: Not on file   Physical Activity: Not on file   Stress: Not on file   Social Connections: Not on file   Intimate Partner Violence: Not on file   Housing Stability: Not on file       Family History   Problem Relation Age of Onset    Alzheimer's disease Mother     Breast cancer Mother     Anxiety disorder Mother         generalized    Stroke Mother         syndrome    Cancer Brother         bladder    Prostate cancer Brother     Hypertension Family     Cancer Family     Alzheimer's disease Father     Hypertension Father        Medications:    Current Outpatient Medications:     ALPRAZolam (XANAX) 0 25 mg tablet, alprazolam 0 25 mg tablet, Disp: , Rfl:     buPROPion (WELLBUTRIN SR) 150 mg 12 hr tablet, Take 150 mg by mouth 2 (two) times a day, Disp: , Rfl: 0    escitalopram (LEXAPRO) 5 mg tablet, Take 1 tablet by mouth daily, Disp: , Rfl:     fluticasone (FLONASE) 50 mcg/act nasal spray, 1 spray into each nostril daily, Disp: 11 1 mL, Rfl: 11    lamoTRIgine (LAMICTAL) 200 MG tablet, Take 1 tablet by mouth 2 (two) times a day, Disp: , Rfl:     levothyroxine 125 mcg tablet, TAKE 1 TABLET BY MOUTH EVERY DAY, Disp: 90 tablet, Rfl: 3    losartan (COZAAR) 50 mg tablet, TAKE 1 TABLET BY MOUTH EVERY DAY, Disp: 90 tablet, Rfl: 3    traZODone (DESYREL) 50 mg tablet, Take 50 mg by mouth  , Disp: , Rfl:     Patient Active Problem List   Diagnosis    Anxiety    Bl[facility] cancer ([facility] [address] [facility]    Major depressive disorder, recurrent, in full remission ([facility] )    GERD without esophagitis    Hyperglycemia    Essential hypertension    Acquired hypothyroidism    Abnormal EKG    Equivocal stress test    Benign prostatic hyperplasia with urinary obstruction    Osteoarthritis of knee    Medicare annual wellness visit, subsequent    Personal history of bladder cancer    Hip pain    Lumbar radiculopathy    Trigger finger    Cough       Objective:  /75   Pulse 70   Ht 5' 8" (1 727 m)   Wt 81 6 kg (180 lb)   BMI 27 37 kg/m²     Left Hand Exam     Other   Erythema: absent  Sensation: normal  Pulse: present    Comments:  Trigger 5th digit  Normal inspection  ttp A1            Physical Exam      Neurologic Exam    Procedures

## 2022-09-15 ENCOUNTER — OFFICE VISIT (OUTPATIENT)
Dept: OBGYN CLINIC | Facility: CLINIC | Age: 72
End: 2022-09-15
Payer: MEDICARE

## 2022-09-15 VITALS
DIASTOLIC BLOOD PRESSURE: 80 MMHG | WEIGHT: 180 LBS | SYSTOLIC BLOOD PRESSURE: 132 MMHG | BODY MASS INDEX: 27.28 KG/M2 | HEIGHT: 68 IN | HEART RATE: 54 BPM

## 2022-09-15 DIAGNOSIS — M65.352 TRIGGER LITTLE FINGER OF LEFT HAND: ICD-10-CM

## 2022-09-15 PROCEDURE — 99214 OFFICE O/P EST MOD 30 MIN: CPT | Performed by: ORTHOPAEDIC SURGERY

## 2022-09-15 NOTE — PROGRESS NOTES
[address] [address]  [address]  [id number]  1950    ORTHOPAEDIC SURGERY OUTPATIENT NOTE  9/15/2022      HISTORY:  70 y o  male who presents to the office today for evaluation and treatment of left small trigger finger  Patient had been seen previously and has tried splinting as well as previous steroid injections for this, unfortunately with return of symptoms  He states at this time, he is looking for a more permanent fix       Past Medical History:   Diagnosis Date    Anemia     Bladder cancer ([address] )     BPH with obstruction/lower urinary tract symptoms     Depression     Erectile dysfunction     Gross hematuria     Hypertension     Hypothyroid     Malignant neoplasm of lateral wall of urinary bladder (HCC)     Nocturia        Past Surgical History:   Procedure Laterality Date    BLADDER SURGERY  2002    bladder cancer surgery    CHOLECYSTECTOMY      Laparoscopic, age 36 something, per allscripts    CYSTECTOMY, PARTIAL      CYSTOSCOPY      w/ biopsy    CYSTOSCOPY  ,,,    KNEE ARTHROSCOPY      Therapeutic, Last assessed: 16    MOUTH SURGERY  2020       Social History     Socioeconomic History    Marital status: /Civil Union     Spouse name: Not on file    Number of children: Not on file    Years of education: Not on file    Highest education level: Not on file   Occupational History    Not on file   Tobacco Use    Smoking status: Former Smoker     Quit date: 1990     Years since quittin 0    Smokeless tobacco: Never Used    Tobacco comment: quit in , per allscripts, No secondhand smoke exposure, per allscripts   Vaping Use    Vaping Use: Never used   Substance and Sexual Activity    Alcohol use: No    Drug use: No    Sexual activity: Not on file   Other Topics Concern    Not on file   Social History Narrative    Activities, leisure reading books    Recreational activities Running         Social Determinants of Health     Financial Resource Strain: Not on file   Food Insecurity: Not on file   Transportation Needs: Not on file   Physical Activity: Not on file   Stress: Not on file   Social Connections: Not on file   Intimate Partner Violence: Not on file   Housing Stability: Not on file       Family History   Problem Relation Age of Onset    Alzheimer's disease Mother     Breast cancer Mother     Anxiety disorder Mother         generalized    Stroke Mother         syndrome    Cancer Brother         bladder    Prostate cancer Brother     Hypertension Family     Cancer Family     Alzheimer's disease Father     Hypertension Father         Patient's Medications   New Prescriptions    No medications on file   Previous Medications    ALPRAZOLAM (XANAX) 0 25 MG TABLET    alprazolam 0 25 mg tablet    BUPROPION (WELLBUTRIN SR) 150 MG 12 HR TABLET    Take 150 mg by mouth 2 (two) times a day    ESCITALOPRAM (LEXAPRO) 5 MG TABLET    Take 1 tablet by mouth daily    FLUTICASONE (FLONASE) 50 MCG/ACT NASAL SPRAY    1 spray into each nostril daily    LAMOTRIGINE (LAMICTAL) 200 MG TABLET    Take 1 tablet by mouth 2 (two) times a day    LEVOTHYROXINE 125 MCG TABLET    Take 1 tablet (125 mcg total) by mouth daily    LOSARTAN (COZAAR) 50 MG TABLET    TAKE 1 TABLET BY MOUTH EVERY DAY    TRAZODONE (DESYREL) 50 MG TABLET    Take 50 mg by mouth     Modified Medications    No medications on file   Discontinued Medications    No medications on file       No Known Allergies     REVIEW OF SYSTEMS:  Constitutional: Negative  HEENT: Negative  Respiratory: Negative  Skin: Negative  Neurological: Negative  Psychiatric/Behavioral: Negative  Musculoskeletal: Negative except for that mentioned in the HPI      PHYSICAL EXAM:    /80   Pulse (!) 54   Ht 5' 8" (1 727 m)   Wt 81 6 kg (180 lb)   BMI 27 37 kg/m² Gen: No acute distress, resting comfortably in bed  HEENT: Eyes clear, moist mucus membranes, hearing intact  Respiratory: No audible wheezing or stridor  Cardiovascular: Well Perfused peripherally, 2+ distal pulse  Abdomen: nondistended, no peritoneal signs    MUSCULOSKELETAL EXAM:  Left Small Finger:  TTP A1 pulley  + Triggering of small finger today on exam   Brisk capillary refill  IMAGING: None    ASSESSMENT AND PLAN:  70 y o  male with left small trigger finger  Since patient has already had this injected and it has returned, he is interested in discussing surgical release  Risks, benefits and alternatives of this surgery discussed, including typical postoperative recovery  All of pt's questions were answered and he stated understanding and agreement with proceeding with left small trigger finger release under local anesthesia       PROCEDURES PERFORMED:  Procedures  No Procedures performed today    Scribe Attestation    I,:  Jay Frazier PA-C am acting as a scribe while in the presence of the attending physician :       I,:  Pushpa Bhandari personally performed the services described in this documentation    as scribed in my presence :

## 2022-09-15 NOTE — H&P
[address] [address]  [address]  [id number]  1950    ORTHOPAEDIC SURGERY OUTPATIENT NOTE  9/15/2022      HISTORY:  70 y o  male who presents to the office today for evaluation and treatment of left small trigger finger  Patient had been seen previously and has tried splinting as well as previous steroid injections for this, unfortunately with return of symptoms  He states at this time, he is looking for a more permanent fix       Past Medical History:   Diagnosis Date    Anemia     Bladder cancer ([address] )     BPH with obstruction/lower urinary tract symptoms     Depression     Erectile dysfunction     Gross hematuria     Hypertension     Hypothyroid     Malignant neoplasm of lateral wall of urinary bladder (HCC)     Nocturia        Past Surgical History:   Procedure Laterality Date    BLADDER SURGERY  2002    bladder cancer surgery    CHOLECYSTECTOMY      Laparoscopic, age 36 something, per allscripts    CYSTECTOMY, PARTIAL      CYSTOSCOPY      w/ biopsy    CYSTOSCOPY  ,,,    KNEE ARTHROSCOPY      Therapeutic, Last assessed: 16    MOUTH SURGERY  2020       Social History     Socioeconomic History    Marital status: /Civil Union     Spouse name: Not on file    Number of children: Not on file    Years of education: Not on file    Highest education level: Not on file   Occupational History    Not on file   Tobacco Use    Smoking status: Former Smoker     Quit date: 1990     Years since quittin 0    Smokeless tobacco: Never Used    Tobacco comment: quit in , per allscripts, No secondhand smoke exposure, per allscripts   Vaping Use    Vaping Use: Never used   Substance and Sexual Activity    Alcohol use: No    Drug use: No    Sexual activity: Not on file   Other Topics Concern    Not on file   Social History Narrative    Activities, leisure reading books    Recreational activities Running         Social Determinants of Health     Financial Resource Strain: Not on file   Food Insecurity: Not on file   Transportation Needs: Not on file   Physical Activity: Not on file   Stress: Not on file   Social Connections: Not on file   Intimate Partner Violence: Not on file   Housing Stability: Not on file       Family History   Problem Relation Age of Onset    Alzheimer's disease Mother     Breast cancer Mother     Anxiety disorder Mother         generalized    Stroke Mother         syndrome    Cancer Brother         bladder    Prostate cancer Brother     Hypertension Family     Cancer Family     Alzheimer's disease Father     Hypertension Father         Patient's Medications   New Prescriptions    No medications on file   Previous Medications    ALPRAZOLAM (XANAX) 0 25 MG TABLET    alprazolam 0 25 mg tablet    BUPROPION (WELLBUTRIN SR) 150 MG 12 HR TABLET    Take 150 mg by mouth 2 (two) times a day    ESCITALOPRAM (LEXAPRO) 5 MG TABLET    Take 1 tablet by mouth daily    FLUTICASONE (FLONASE) 50 MCG/ACT NASAL SPRAY    1 spray into each nostril daily    LAMOTRIGINE (LAMICTAL) 200 MG TABLET    Take 1 tablet by mouth 2 (two) times a day    LEVOTHYROXINE 125 MCG TABLET    Take 1 tablet (125 mcg total) by mouth daily    LOSARTAN (COZAAR) 50 MG TABLET    TAKE 1 TABLET BY MOUTH EVERY DAY    TRAZODONE (DESYREL) 50 MG TABLET    Take 50 mg by mouth     Modified Medications    No medications on file   Discontinued Medications    No medications on file       No Known Allergies     REVIEW OF SYSTEMS:  Constitutional: Negative  HEENT: Negative  Respiratory: Negative  Skin: Negative  Neurological: Negative  Psychiatric/Behavioral: Negative  Musculoskeletal: Negative except for that mentioned in the HPI      PHYSICAL EXAM:    /80   Pulse (!) 54   Ht 5' 8" (1 727 m)   Wt 81 6 kg (180 lb)   BMI 27 37 kg/m² Gen: No acute distress, resting comfortably in bed  HEENT: Eyes clear, moist mucus membranes, hearing intact  Respiratory: No audible wheezing or stridor  Cardiovascular: Well Perfused peripherally, 2+ distal pulse  Abdomen: nondistended, no peritoneal signs    MUSCULOSKELETAL EXAM:  Left Small Finger:  TTP A1 pulley  + Triggering of small finger today on exam   Brisk capillary refill  IMAGING: None    ASSESSMENT AND PLAN:  70 y o  male with left small trigger finger  Since patient has already had this injected and it has returned, he is interested in discussing surgical release  Risks, benefits and alternatives of this surgery discussed, including typical postoperative recovery  All of pt's questions were answered and he stated understanding and agreement with proceeding with left small trigger finger release under local anesthesia       PROCEDURES PERFORMED:  Procedures  No Procedures performed today    Scribe Attestation    I,:  Lawanda Carr PA-C am acting as a scribe while in the presence of the attending physician :       I,:  Tonja Nelson personally performed the services described in this documentation    as scribed in my presence :

## 2022-10-05 DIAGNOSIS — I10 ESSENTIAL HYPERTENSION: ICD-10-CM

## 2022-10-05 RX ORDER — LOSARTAN POTASSIUM 50 MG/1
TABLET ORAL
Qty: 90 TABLET | Refills: 3 | Status: SHIPPED | OUTPATIENT
Start: 2022-10-05

## 2022-10-11 ENCOUNTER — APPOINTMENT (OUTPATIENT)
Dept: LAB | Facility: MEDICAL CENTER | Age: 72
End: 2022-10-11
Payer: MEDICARE

## 2022-10-11 ENCOUNTER — APPOINTMENT (OUTPATIENT)
Dept: RADIOLOGY | Facility: MEDICAL CENTER | Age: 72
End: 2022-10-11
Payer: MEDICARE

## 2022-10-11 DIAGNOSIS — R73.9 HYPERGLYCEMIA: ICD-10-CM

## 2022-10-11 DIAGNOSIS — E03.9 ACQUIRED HYPOTHYROIDISM: ICD-10-CM

## 2022-10-11 DIAGNOSIS — R05.9 COUGH: ICD-10-CM

## 2022-10-11 LAB
ALBUMIN SERPL BCP-MCNC: 3.8 G/DL (ref 3.5–5)
ALP SERPL-CCNC: 66 U/L (ref 46–116)
ALT SERPL W P-5'-P-CCNC: 21 U/L (ref 12–78)
ANION GAP SERPL CALCULATED.3IONS-SCNC: 1 MMOL/L (ref 4–13)
AST SERPL W P-5'-P-CCNC: 19 U/L (ref 5–45)
BILIRUB SERPL-MCNC: 0.65 MG/DL (ref 0.2–1)
BUN SERPL-MCNC: 15 MG/DL (ref 5–25)
CALCIUM SERPL-MCNC: 9.2 MG/DL (ref 8.3–10.1)
CHLORIDE SERPL-SCNC: 107 MMOL/L (ref 96–108)
CO2 SERPL-SCNC: 30 MMOL/L (ref 21–32)
CREAT SERPL-MCNC: 1.2 MG/DL (ref 0.6–1.3)
EST. AVERAGE GLUCOSE BLD GHB EST-MCNC: 123 MG/DL
GFR SERPL CREATININE-BSD FRML MDRD: 60 ML/MIN/1.73SQ M
GLUCOSE P FAST SERPL-MCNC: 104 MG/DL (ref 65–99)
HBA1C MFR BLD: 5.9 %
POTASSIUM SERPL-SCNC: 4.5 MMOL/L (ref 3.5–5.3)
PROT SERPL-MCNC: 7.4 G/DL (ref 6.4–8.4)
SODIUM SERPL-SCNC: 138 MMOL/L (ref 135–147)
TSH SERPL DL<=0.05 MIU/L-ACNC: 3.07 UIU/ML (ref 0.45–4.5)

## 2022-10-11 PROCEDURE — 83036 HEMOGLOBIN GLYCOSYLATED A1C: CPT

## 2022-10-11 PROCEDURE — 71046 X-RAY EXAM CHEST 2 VIEWS: CPT

## 2022-10-11 PROCEDURE — 36415 COLL VENOUS BLD VENIPUNCTURE: CPT

## 2022-10-11 PROCEDURE — 80053 COMPREHEN METABOLIC PANEL: CPT

## 2022-10-11 PROCEDURE — 84443 ASSAY THYROID STIM HORMONE: CPT

## 2022-10-17 ENCOUNTER — OFFICE VISIT (OUTPATIENT)
Dept: FAMILY MEDICINE CLINIC | Facility: CLINIC | Age: 72
End: 2022-10-17
Payer: MEDICARE

## 2022-10-17 VITALS
SYSTOLIC BLOOD PRESSURE: 120 MMHG | WEIGHT: 174 LBS | HEART RATE: 65 BPM | DIASTOLIC BLOOD PRESSURE: 60 MMHG | HEIGHT: 68 IN | OXYGEN SATURATION: 97 % | BODY MASS INDEX: 26.37 KG/M2

## 2022-10-17 DIAGNOSIS — Z00.00 MEDICARE ANNUAL WELLNESS VISIT, SUBSEQUENT: ICD-10-CM

## 2022-10-17 DIAGNOSIS — N13.8 BENIGN PROSTATIC HYPERPLASIA WITH URINARY OBSTRUCTION: ICD-10-CM

## 2022-10-17 DIAGNOSIS — Z85.51 PERSONAL HISTORY OF BLADDER CANCER: ICD-10-CM

## 2022-10-17 DIAGNOSIS — E78.2 MIXED HYPERLIPIDEMIA: ICD-10-CM

## 2022-10-17 DIAGNOSIS — N40.1 BENIGN PROSTATIC HYPERPLASIA WITH URINARY OBSTRUCTION: ICD-10-CM

## 2022-10-17 DIAGNOSIS — F33.42 MAJOR DEPRESSIVE DISORDER, RECURRENT, IN FULL REMISSION (HCC): ICD-10-CM

## 2022-10-17 DIAGNOSIS — R73.9 HYPERGLYCEMIA: ICD-10-CM

## 2022-10-17 DIAGNOSIS — E03.9 ACQUIRED HYPOTHYROIDISM: ICD-10-CM

## 2022-10-17 DIAGNOSIS — I10 ESSENTIAL HYPERTENSION: Primary | ICD-10-CM

## 2022-10-17 PROCEDURE — 99214 OFFICE O/P EST MOD 30 MIN: CPT | Performed by: PHYSICIAN ASSISTANT

## 2022-10-17 PROCEDURE — G0439 PPPS, SUBSEQ VISIT: HCPCS | Performed by: PHYSICIAN ASSISTANT

## 2022-10-17 NOTE — PATIENT INSTRUCTIONS
1  Essential hypertension  Assessment & Plan:  Stable continue Cozaar  2  Acquired hypothyroidism  Assessment & Plan:  TSH within normal limits continue levothyroxine 125 mcg once daily  Orders:  -     TSH, 3rd generation with Free T4 reflex; Future; Expected date: 04/17/2023    3  Major depressive disorder, recurrent, in full remission [facility])  Assessment & Plan:  Continue Psychiatry patient on Lamictal Lexapro Wellbutrin trazodone and Xanax  4  Hyperglycemia  Assessment & Plan:  Fasting sugar 104  A1c 5 9 stable continue decreasing simple carbs such as bread pasta potatoes rice junk food desserts and sweets  Orders:  -     Comprehensive metabolic panel; Future; Expected date: 04/17/2023  -     Hemoglobin A1C; Future; Expected date: 04/17/2023    5  Personal history of bladder cancer  Assessment & Plan:  Continue with urology follow-up  Orders:  -     CBC and differential; Future; Expected date: 04/17/2023    6  Benign prostatic hyperplasia with urinary obstruction  Assessment & Plan:  Continue with urology follow-up  7  Mixed hyperlipidemia  Assessment & Plan:  6 months ago LDL was 123  Check fasting lipid panel with next labs  Orders:  -     Lipid Panel with Direct LDL reflex; Future; Expected date: 04/17/2023    8  Medicare annual wellness visit, subsequent  Assessment & Plan:  All vaccines up-to-date including shingles pneumonia and COVID  Recommend patient bringing living will to follow under his ACP tab document  Blue pack in 5 wishes given in the past   Blood work up-to-date

## 2022-10-17 NOTE — ASSESSMENT & PLAN NOTE
Fasting sugar 104  A1c 5 9 stable continue decreasing simple carbs such as bread pasta potatoes rice junk food desserts and sweets

## 2022-10-17 NOTE — PROGRESS NOTES
Assessment and Plan:     Problem List Items Addressed This Visit        Endocrine    Acquired hypothyroidism     TSH within normal limits continue levothyroxine 125 mcg once daily  Relevant Orders    TSH, 3rd generation with Free T4 reflex       Cardiovascular and Mediastinum    Essential hypertension - Primary     Stable continue Cozaar  Genitourinary    Benign prostatic hyperplasia with urinary obstruction     Continue with urology follow-up  Other    Major depressive disorder, recurrent, in full remission [facility])     Continue Psychiatry patient on Lamictal Lexapro Wellbutrin trazodone and Xanax  Hyperglycemia     Fasting sugar 104  A1c 5 9 stable continue decreasing simple carbs such as bread pasta potatoes rice junk food desserts and sweets  Relevant Orders    Comprehensive metabolic panel    Hemoglobin A1C    Medicare annual wellness visit, subsequent     All vaccines up-to-date including shingles pneumonia and COVID  Recommend patient bringing living will to follow under his ACP tab document  Blue pack in 5 wishes given in the past   Blood work up-to-date  Personal history of bladder cancer     Continue with urology follow-up  Relevant Orders    CBC and differential    Mixed hyperlipidemia     6 months ago LDL was 123  Check fasting lipid panel with next labs  Relevant Orders    Lipid Panel with Direct LDL reflex          Falls Plan of Care: balance, strength, and gait training instructions were provided  Preventive health issues were discussed with patient, and age appropriate screening tests were ordered as noted in patient's After Visit Summary  Personalized health advice and appropriate referrals for health education or preventive services given if needed, as noted in patient's After Visit Summary       History of Present Illness:     Patient presents for a Medicare Wellness Visit      Dasia Mckee is here for chronic conditions f/u including the diagnosis of No diagnosis found    Pt  states they are taking all medications as directed without complaints or side effects   Pt  had labs done prior to today's visit which included Recent Results (from the past 672 hour(s))  -TSH, 3rd generation with Free T4 reflex:   Collection Time: 10/11/22 11:16 AM       Result                      Value             Ref Range           TSH 3RD GENERATON           3 070             0 450 - 4 50*  -Comprehensive metabolic panel:   Collection Time: 10/11/22 11:16 AM       Result                      Value             Ref Range           Sodium                      138               135 - 147 mm*       Potassium                   4 5               3 5 - 5 3 mm*       Chloride                    107               96 - 108 mmo*       CO2                         30                21 - 32 mmol*       ANION GAP                   1 (L)             4 - 13 mmol/L       BUN                         15                5 - 25 mg/dL        Creatinine                  1 20              0 60 - 1 30 *       Glucose, Fasting            104 (H)           65 - 99 mg/dL       Calcium                     9 2               8 3 - 10 1 m*       AST                         19                5 - 45 U/L          ALT                         21                12 - 78 U/L         Alkaline Phosphatase        66                46 - 116 U/L        Total Protein               7 4               6 4 - 8 4 g/*       Albumin                     3 8               3 5 - 5 0 g/*       Total Bilirubin             0 65              0 20 - 1 00 *       eGFR                        60                ml/min/1 73s*  -Hemoglobin A1C:   Collection Time: 10/11/22 11:16 AM       Result                      Value             Ref Range           Hemoglobin A1C              5 9 (H)           Normal 3 8-5*       EAG                         123               mg/dl               Patient Care Team:  Juan Rubio Lidia Walters as PCP - General (Family Medicine)  MD Long Manriquez PA-C     Review of Systems:     Review of Systems   Constitutional: Negative  HENT: Negative  Eyes: Negative  Respiratory: Negative  Cardiovascular: Negative  Gastrointestinal: Negative  Endocrine: Negative  Genitourinary: Negative  Musculoskeletal: Negative  Skin: Negative  Allergic/Immunologic: Negative  Neurological: Negative  Hematological: Negative  Psychiatric/Behavioral: Negative           Problem List:     Patient Active Problem List   Diagnosis   • Anxiety   • Major depressive disorder, recurrent, in full remission ([facility] )   • GERD without esophagitis   • Hyperglycemia   • Essential hypertension   • Acquired hypothyroidism   • Abnormal EKG   • Equivocal stress test   • Benign prostatic hyperplasia with urinary obstruction   • Osteoarthritis of knee   • Medicare annual wellness visit, subsequent   • Personal history of bladder cancer   • Hip pain   • Lumbar radiculopathy   • Trigger finger   • Mixed hyperlipidemia      Past Medical and Surgical History:     Past Medical History:   Diagnosis Date   • Anemia    • Bladder cancer ([facility] ) 2002   • BPH with obstruction/lower urinary tract symptoms    • Depression    • Erectile dysfunction    • Gross hematuria    • Hypertension    • Hypothyroid    • Malignant neoplasm of lateral wall of urinary bladder (HCC)    • Nocturia      Past Surgical History:   Procedure Laterality Date   • BLADDER SURGERY  05/2002    bladder cancer surgery   • CHOLECYSTECTOMY      Laparoscopic, age 36 something, per allscripts   • CYSTECTOMY, PARTIAL     • CYSTOSCOPY  2010    w/ biopsy   • CYSTOSCOPY  2012,2013,2015,2016   • KNEE ARTHROSCOPY      Therapeutic, Last assessed: 5/25/16   • MOUTH SURGERY  09/2020      Family History:     Family History   Problem Relation Age of Onset   • Alzheimer's disease Mother    • Breast cancer Mother    • Anxiety disorder Mother generalized   • Stroke Mother         syndrome   • Cancer Brother         bladder   • Prostate cancer Brother    • Hypertension Family    • Cancer Family    • Alzheimer's disease Father    • Hypertension Father       Social History:     Social History     Socioeconomic History   • Marital status: /Civil Union     Spouse name: None   • Number of children: None   • Years of education: None   • Highest education level: None   Occupational History   • None   Tobacco Use   • Smoking status: Former Smoker     Quit date: 1990     Years since quittin 1   • Smokeless tobacco: Never Used   • Tobacco comment: quit in , per allscripts, No secondhand smoke exposure, per allscripts   Vaping Use   • Vaping Use: Never used   Substance and Sexual Activity   • Alcohol use: No   • Drug use: No   • Sexual activity: None   Other Topics Concern   • None   Social History Narrative    Activities, leisure reading books    Recreational activities Running         Social Determinants of Health     Financial Resource Strain: Low Risk    • Difficulty of Paying Living Expenses: Not very hard   Food Insecurity: Not on file   Transportation Needs: No Transportation Needs   • Lack of Transportation (Medical): No   • Lack of Transportation (Non-Medical):  No   Physical Activity: Not on file   Stress: Not on file   Social Connections: Not on file   Intimate Partner Violence: Not on file   Housing Stability: Not on file      Medications and Allergies:     Current Outpatient Medications   Medication Sig Dispense Refill   • ALPRAZolam (XANAX) 0 25 mg tablet alprazolam 0 25 mg tablet     • buPROPion (WELLBUTRIN SR) 150 mg 12 hr tablet Take 150 mg by mouth 2 (two) times a day  0   • escitalopram (LEXAPRO) 5 mg tablet Take 1 tablet by mouth daily     • fluticasone (FLONASE) 50 mcg/act nasal spray 1 spray into each nostril daily 11 1 mL 11   • lamoTRIgine (LaMICtal) 200 MG tablet Take 1 tablet by mouth 2 (two) times a day     • levothyroxine 125 mcg tablet Take 1 tablet (125 mcg total) by mouth daily 90 tablet 3   • losartan (COZAAR) 50 mg tablet TAKE 1 TABLET BY MOUTH EVERY DAY 90 tablet 3   • traZODone (DESYREL) 50 mg tablet Take 50 mg by mouth         No current facility-administered medications for this visit  No Known Allergies   Immunizations:     Immunization History   Administered Date(s) Administered   • COVID-19 MODERNA VACC 0 5 ML IM 01/27/2021, 03/04/2021, 10/28/2021, 04/02/2022   • COVID-19 Pfizer Vac BIVALENT Rafael-sucrose 12 Yr+ IM (BOOSTER ONLY) 09/18/2022   • H1N1, All Formulations 10/26/2009   • Hep A, ped/adol, 2 dose 10/24/2013   • INFLUENZA 11/09/2016, 11/21/2018, 11/08/2019, 12/01/2020, 10/22/2021, 10/01/2022   • Influenza Split High Dose Preservative Free IM 11/09/2016, 12/07/2017, 11/14/2018, 11/08/2019   • Influenza, high dose seasonal 0 7 mL 11/08/2019   • Pneumococcal Conjugate 13-Valent 07/12/2018   • Pneumococcal Polysaccharide PPV23 12/28/2015   • Tdap 12/07/2017   • Typhoid Live, oral 10/24/2013   • Yellow Fever 10/24/2013   • Zoster 08/27/2013   • Zoster Vaccine Recombinant 02/09/2022      Health Maintenance:         Topic Date Due   • Hepatitis C Screening  Never done   • Colorectal Cancer Screening  10/08/2023     There are no preventive care reminders to display for this patient  Medicare Screening Tests and Risk Assessments:     Essie Licona is here for his Subsequent Wellness visit  Health Risk Assessment:   Patient rates overall health as very good  Patient feels that their physical health rating is slightly better  Patient is satisfied with their life  Eyesight was rated as same  Hearing was rated as slightly worse  Patient feels that their emotional and mental health rating is same  Patients states they are sometimes angry  Patient states they are sometimes unusually tired/fatigued  Pain experienced in the last 7 days has been none   Patient states that he has experienced no weight loss or gain in last 6 months  Fall Risk Screening: In the past year, patient has experienced: history of falling in past year    Number of falls: 2 or more  Injured during fall?: No    Feels unsteady when standing or walking?: Yes    Worried about falling?: No      Home Safety:  Patient does not have trouble with stairs inside or outside of their home  Patient has working smoke alarms and has working carbon monoxide detector  Home safety hazards include: none  Nutrition:   Current diet is Regular  Medications:   Patient is currently taking over-the-counter supplements  OTC medications include: see medication list  Patient is able to manage medications  Activities of Daily Living (ADLs)/Instrumental Activities of Daily Living (IADLs):   Walk and transfer into and out of bed and chair?: Yes  Dress and groom yourself?: Yes    Bathe or shower yourself?: Yes    Feed yourself?  Yes  Do your laundry/housekeeping?: Yes  Manage your money, pay your bills and track your expenses?: Yes  Make your own meals?: Yes    Do your own shopping?: Yes    Previous Hospitalizations:   Any hospitalizations or ED visits within the last 12 months?: No      Advance Care Planning:     Advanced directive counseling given: Yes    Five wishes given: Yes      Cognitive Screening:   Provider or family/friend/caregiver concerned regarding cognition?: No    PREVENTIVE SCREENINGS      Cardiovascular Screening:    General: Screening Current      Diabetes Screening:     General: Screening Current      Colorectal Cancer Screening:     General: Screening Current      Osteoporosis Screening:    General: Screening Not Indicated      Abdominal Aortic Aneurysm (AAA) Screening:    Risk factors include: age between 73-69 yo and tobacco use        General: Screening Not Indicated      Lung Cancer Screening:     General: Screening Not Indicated      Hepatitis C Screening:    General: Risks and Benefits Discussed    Screening, Brief Intervention, and Referral to Treatment (SBIRT)    Screening      AUDIT-C Screenin) How often did you have a drink containing alcohol in the past year? monthly or less  2) How many drinks did you have on a typical day when you were drinking in the past year? 1 to 2  3) How often did you have 6 or more drinks on one occasion in the past year? never    AUDIT-C Score: 1  Interpretation: Score 0-3 (male): Negative screen for alcohol misuse    Single Item Drug Screening:  How often have you used an illegal drug (including marijuana) or a prescription medication for non-medical reasons in the past year? never    Single Item Drug Screen Score: 0  Interpretation: Negative screen for possible drug use disorder    No exam data present     Physical Exam:     /60 (BP Location: Left arm, Patient Position: Sitting, Cuff Size: Large)   Pulse 65   Ht 5' 8" (1 727 m)   Wt 78 9 kg (174 lb)   SpO2 97%   BMI 26 46 kg/m²     Physical Exam  Vitals and nursing note reviewed  Constitutional:       Appearance: Normal appearance  HENT:      Head: Normocephalic and atraumatic  Eyes:      General: Lids are normal       Conjunctiva/sclera: Conjunctivae normal       Pupils: Pupils are equal, round, and reactive to light  Cardiovascular:      Rate and Rhythm: Normal rate and regular rhythm  Heart sounds: Normal heart sounds  Pulmonary:      Effort: Pulmonary effort is normal       Breath sounds: Normal breath sounds  Skin:     General: Skin is warm and dry  Neurological:      General: No focal deficit present  Mental Status: He is alert  Mental status is at baseline  Psychiatric:         Mood and Affect: Mood normal          Behavior: Behavior normal          Thought Content:  Thought content normal          Judgment: Judgment normal           Regi Taylor PA-C

## 2022-10-17 NOTE — ASSESSMENT & PLAN NOTE
All vaccines up-to-date including shingles pneumonia and COVID  Recommend patient bringing living will to follow under his ACP tab document  Blue pack in 5 wishes given in the past   Blood work up-to-date

## 2022-11-11 ENCOUNTER — HOSPITAL ENCOUNTER (OUTPATIENT)
Facility: HOSPITAL | Age: 72
Setting detail: OUTPATIENT SURGERY
Discharge: HOME | End: 2022-11-11
Attending: ORTHOPAEDIC SURGERY | Admitting: ORTHOPAEDIC SURGERY

## 2022-11-11 VITALS
OXYGEN SATURATION: 94 % | HEART RATE: 56 BPM | RESPIRATION RATE: 18 BRPM | WEIGHT: 175 LBS | HEIGHT: 68 IN | BODY MASS INDEX: 26.52 KG/M2 | TEMPERATURE: 97.6 F | SYSTOLIC BLOOD PRESSURE: 146 MMHG | DIASTOLIC BLOOD PRESSURE: 70 MMHG

## 2022-11-11 DIAGNOSIS — M65.352 TRIGGER LITTLE FINGER OF LEFT HAND: Primary | ICD-10-CM

## 2022-11-11 RX ORDER — IBUPROFEN 800 MG/1
800 TABLET ORAL EVERY 8 HOURS PRN
Qty: 30 TABLET | Refills: 0 | Status: SHIPPED | OUTPATIENT
Start: 2022-11-11

## 2022-11-11 RX ORDER — CEPHALEXIN 500 MG/1
500 CAPSULE ORAL EVERY 6 HOURS SCHEDULED
Qty: 8 CAPSULE | Refills: 0 | Status: SHIPPED | OUTPATIENT
Start: 2022-11-11 | End: 2022-11-13

## 2022-11-11 NOTE — DISCHARGE INSTRUCTIONS
Laila Monae,     Orthopedic Surgery, Shoulder/Elbow and Sports Medicine  [facility]   [address] [facility], [address] [place]  Phone: [phone]    General Post-op Surgical Instructions:    Date of Procedure - 11/11/22     Procedure - Left Small Trigger Finger Release    Weight Bearing Status - Please limit your weight bearing with the operative hand to <5lbs for the 1st week after surgery  DVT Prophylaxis and Duration - None required    PT/OT Instructions - Therapy will be discussed at your first postoperative visit  In the meantime, please make sure to move your wrist/fingers periodically throughout the day to prevent stiffness and help with swelling  Stitches/Staples - Removed at 7-10 days postop; we will then place steristrips on incision site    Wound Care - Please keep dressings clean, dry and intact for 3 days  After 3 days, you may remove your surgical dressing and apply clean Band-Aids to your incision  Additional Info - Please make sure to complete your course of antibiotics as instructed  Any questions or concerns please call [phone] please!

## 2022-11-11 NOTE — H&P
[unfilled]       Brandin Vigil  [id number]  1950    ORTHOPAEDIC SURGERY OUTPATIENT NOTE  9/15/2022      HISTORY:  67 y o  male  male who presents to the office today for evaluation and treatment of left small trigger finger  Patient had been seen previously and has tried splinting as well as previous steroid injections for this, unfortunately with return of symptoms  He states at this time, he is looking for a more permanent fix          Past Medical History:   Diagnosis Date   • Anemia    • Bladder cancer ([address] )    • BPH with obstruction/lower urinary tract symptoms    • Depression    • Erectile dysfunction    • Gross hematuria    • Hypertension    • Hypothyroid    • Malignant neoplasm of lateral wall of urinary bladder (HCC)    • Nocturia        Past Surgical History:   Procedure Laterality Date   • BLADDER SURGERY  2002    bladder cancer surgery   • CHOLECYSTECTOMY      Laparoscopic, age 36 something, per allscripts   • CYSTECTOMY, PARTIAL     • CYSTOSCOPY      w/ biopsy   • CYSTOSCOPY  ,,,   • KNEE ARTHROSCOPY      Therapeutic, Last assessed: 16   • MOUTH SURGERY  2020       Social History     Socioeconomic History   • Marital status: /Civil Union     Spouse name: Not on file   • Number of children: Not on file   • Years of education: Not on file   • Highest education level: Not on file   Occupational History   • Not on file   Tobacco Use   • Smoking status: Former Smoker     Quit date: 1990     Years since quittin 2   • Smokeless tobacco: Never Used   • Tobacco comment: quit in , per allscripts, No secondhand smoke exposure, per allscripts   Vaping Use   • Vaping Use: Never used   Substance and Sexual Activity   • Alcohol use: No   • Drug use: No   • Sexual activity: Not on file   Other Topics Concern   • Not on file   Social History Narrative    Activities, leisure reading books    Recreational activities Running         Social Determinants of Health     Financial Resource Strain: Low Risk    • Difficulty of Paying Living Expenses: Not very hard   Food Insecurity: Not on file   Transportation Needs: No Transportation Needs   • Lack of Transportation (Medical): No   • Lack of Transportation (Non-Medical):  No   Physical Activity: Not on file   Stress: Not on file   Social Connections: Not on file   Intimate Partner Violence: Not on file   Housing Stability: Not on file       Family History   Problem Relation Age of Onset   • Alzheimer's disease Mother    • Breast cancer Mother    • Anxiety disorder Mother         generalized   • Stroke Mother         syndrome   • Cancer Brother         bladder   • Prostate cancer Brother    • Hypertension Family    • Cancer Family    • Alzheimer's disease Father    • Hypertension Father         Current Discharge Medication List      CONTINUE these medications which have NOT CHANGED    Details   ALPRAZolam (XANAX) 0 25 mg tablet alprazolam 0 25 mg tablet      buPROPion (WELLBUTRIN SR) 150 mg 12 hr tablet Take 150 mg by mouth 2 (two) times a day  Refills: 0      escitalopram (LEXAPRO) 5 mg tablet Take 1 tablet by mouth daily      lamoTRIgine (LaMICtal) 200 MG tablet Take 1 tablet by mouth 2 (two) times a day      levothyroxine 125 mcg tablet Take 1 tablet (125 mcg total) by mouth daily  Qty: 90 tablet, Refills: 3    Associated Diagnoses: Acquired hypothyroidism      losartan (COZAAR) 50 mg tablet Take 1 tablet (50 mg total) by mouth daily  Qty: 90 tablet, Refills: 1    Associated Diagnoses: Essential hypertension      traZODone (DESYREL) 50 mg tablet Take 50 mg by mouth        fluticasone (FLONASE) 50 mcg/act nasal spray 1 spray into each nostril daily  Qty: 11 1 mL, Refills: 11    Associated Diagnoses: Cough             No Known Allergies     /80   Pulse (!) 53   Temp (!) 97 3 °F (36 3 °C) (Temporal)   Resp 18   Ht 5' 8" (1 727 m)   Wt 79 4 kg (175 lb)   SpO2 98%   BMI 26 61 kg/m²      REVIEW OF SYSTEMS:  Constitutional: Negative  HEENT: Negative  Respiratory: Negative  Skin: Negative  Neurological: Negative  Psychiatric/Behavioral: Negative  Musculoskeletal: Negative except for that mentioned in the HPI  /80   Pulse (!) 53   Temp (!) 97 3 °F (36 3 °C) (Temporal)   Resp 18   Ht 5' 8" (1 727 m)   Wt 79 4 kg (175 lb)   SpO2 98%   BMI 26 61 kg/m²   Gen: No acute distress, resting comfortably in bed  HEENT: Eyes clear, moist mucus membranes, hearing intact  Respiratory: No audible wheezing or stridor  Cardiovascular: Well Perfused peripherally, 2+ distal pulse  Abdomen: nondistended, no peritoneal signs     PHYSICAL EXAM:  Left Small Finger:  TTP A1 pulley  + Triggering of small finger today on exam   Brisk capillary refill  IMAGING:  none    ASSESSMENT AND PLAN:  67 y o  male  with left small trigger finger  Since patient has already had this injected and it has returned, he is interested in discussing surgical release  Risks, benefits and alternatives of this surgery discussed, including typical postoperative recovery  All of pt's questions were answered and he stated understanding and agreement with proceeding with left small trigger finger release under local anesthesia

## 2022-11-11 NOTE — OP NOTE
OPERATIVE REPORT  PATIENT NAME: Brandin Vigil    :  1950  MRN: [id number]  Pt Location: [facility] OR ROOM 01    SURGERY DATE: 2022    Surgeon(s) and Role:     * Laila Monae - Primary     * Khalif Salgado PA-C - Assisting    Preop Diagnosis:  Trigger little finger of left hand [M65 352]    Post-Op Diagnosis Codes:     * Trigger little finger of left hand [M65 352]    Procedure(s) (LRB):  RELEASE LEFT SMALL TRIGGER FINGER (Left)    Specimen(s):  * No specimens in log *    Estimated Blood Loss:   Minimal    Drains:  * No LDAs found *    Anesthesia Type:   Local    Operative Indications:  Trigger little finger of left hand [M65 352]      Operative Findings: Thickened A1 pulley    Complications:   None    Procedure and Technique:     INDICATIONS AND HISTORY:  This is a 67 y o  male  with left small finger trigger finger  Patient failed conservative treatment and wished to have surgical intervention  Patient understood risks, benefits of procedure with risks including pain, recurrence of symptoms, neurovascular injury, tendon injury, infection, wound dehiscence, blood loss, blood clots, stroke, heart attack, all up to including death  Patient understood and did consent for surgery today  DESCRIPTION OF OPERATION:  Patient was seen in pre-op holding area, where the operative extremity was marked  Procedure verified  The patient was taken to operating room, placed in the supine position  The operative upper extremity was prepped and draped in usual sterile fashion  A time-out was called  The patient was administered Ancef 2 gm IV prior to incision  Using a 15-blade knife, incision was made over the A1 pulley  Subcutaneous dissection was taken  down to reveal the A1 pulley over the flexor tendon sheath  The A1 pulley was incised and released proximally and distally using a tenotomy scissors    Once the A1 pulley was fully released, the flexor tendon was fully elevated out of the wound, demonstrating full release of the A1 pulley  The digit was ranged passively in flexion and extension and demonstrated no locking or catching  The tourniquet was taken down  Hemostasis was undertaken with bipolar cautery  The wound was copiously irrigated  The wound was then closed with 3-0 nylon  Dressings include Xeroform, 2 x 2 gauze, Kamron, and Ace bandage  The patient tolerated the procedure well  There was no complication throughout the case  Patient was placed in PACU in stable condition  No qualified resident was available for the procedure      I was present for the entire procedure, A qualified resident physician was not available and A physician assistant was required during the procedure for retraction tissue handling,dissection and suturing    Patient Disposition:  PACU         SIGNATURE: Laila Chancealice  DATE: November 11, 2022  TIME: 2:34 PM

## 2022-11-17 ENCOUNTER — OFFICE VISIT (OUTPATIENT)
Dept: OBGYN CLINIC | Facility: CLINIC | Age: 72
End: 2022-11-17

## 2022-11-17 VITALS
WEIGHT: 171.5 LBS | BODY MASS INDEX: 25.99 KG/M2 | DIASTOLIC BLOOD PRESSURE: 80 MMHG | HEART RATE: 53 BPM | HEIGHT: 68 IN | SYSTOLIC BLOOD PRESSURE: 146 MMHG

## 2022-11-17 DIAGNOSIS — Z47.89 AFTERCARE FOLLOWING SURGERY OF THE MUSCULOSKELETAL SYSTEM: Primary | ICD-10-CM

## 2022-11-17 NOTE — PROGRESS NOTES
[address] [address]  [address]  [id number]  1950    ORTHOPAEDIC SURGERY OUTPATIENT NOTE  2022      HISTORY:  67 y o  male  presents for 1st postoperative visit status post left small finger trigger release  He is doing well  He has no locking or triggering of the finger  No numbness or tingling in the hand  No fever chills      Past Medical History:   Diagnosis Date   • Anemia    • Bladder cancer ([address] )    • BPH with obstruction/lower urinary tract symptoms    • Depression    • Erectile dysfunction    • Gross hematuria    • Hypertension    • Hypothyroid    • Malignant neoplasm of lateral wall of urinary bladder (HCC)    • Nocturia        Past Surgical History:   Procedure Laterality Date   • BLADDER SURGERY  2002    bladder cancer surgery   • CHOLECYSTECTOMY      Laparoscopic, age 36 something, per allscripts   • CYSTECTOMY, PARTIAL     • CYSTOSCOPY      w/ biopsy   • CYSTOSCOPY  ,,,   • KNEE ARTHROSCOPY      Therapeutic, Last assessed: 16   • MOUTH SURGERY  2020   • HI INCISE FINGER TENDON SHEATH Left 2022    Procedure: RELEASE LEFT SMALL TRIGGER FINGER;  Surgeon: Eduar Cortes;  Location: [facility];  Service: Orthopedics       Social History     Socioeconomic History   • Marital status: /Civil Union     Spouse name: Not on file   • Number of children: Not on file   • Years of education: Not on file   • Highest education level: Not on file   Occupational History   • Not on file   Tobacco Use   • Smoking status: Former     Types: Cigarettes     Quit date: 1990     Years since quittin 2   • Smokeless tobacco: Never   • Tobacco comments:     quit in , per allscripts, No secondhand smoke exposure, per allscripts   Vaping Use   • Vaping Use: Never used   Substance and Sexual Activity   • Alcohol use: No   • Drug use: No   • Sexual activity: Not on file   Other Topics Concern   • Not on file   Social History Narrative    Activities, leisure reading books    Recreational activities Running         Social Determinants of Health     Financial Resource Strain: Low Risk    • Difficulty of Paying Living Expenses: Not very hard   Food Insecurity: Not on file   Transportation Needs: No Transportation Needs   • Lack of Transportation (Medical): No   • Lack of Transportation (Non-Medical):  No   Physical Activity: Not on file   Stress: Not on file   Social Connections: Not on file   Intimate Partner Violence: Not on file   Housing Stability: Not on file       Family History   Problem Relation Age of Onset   • Alzheimer's disease Mother    • Breast cancer Mother    • Anxiety disorder Mother         generalized   • Stroke Mother         syndrome   • Cancer Brother         bladder   • Prostate cancer Brother    • Hypertension Family    • Cancer Family    • Alzheimer's disease Father    • Hypertension Father         Patient's Medications   New Prescriptions    No medications on file   Previous Medications    ALPRAZOLAM (XANAX) 0 25 MG TABLET    alprazolam 0 25 mg tablet    BUPROPION (WELLBUTRIN SR) 150 MG 12 HR TABLET    Take 150 mg by mouth 2 (two) times a day    ESCITALOPRAM (LEXAPRO) 5 MG TABLET    Take 1 tablet by mouth daily    FLUTICASONE (FLONASE) 50 MCG/ACT NASAL SPRAY    1 spray into each nostril daily    IBUPROFEN (MOTRIN) 800 MG TABLET    Take 1 tablet (800 mg total) by mouth every 8 (eight) hours as needed for mild pain    LAMOTRIGINE (LAMICTAL) 200 MG TABLET    Take 1 tablet by mouth 2 (two) times a day    LEVOTHYROXINE 125 MCG TABLET    Take 1 tablet (125 mcg total) by mouth daily    LOSARTAN (COZAAR) 50 MG TABLET    Take 1 tablet (50 mg total) by mouth daily    TRAZODONE (DESYREL) 50 MG TABLET    Take 50 mg by mouth     Modified Medications    No medications on file   Discontinued Medications    No medications on file       No Known Allergies     /80 (BP Location: Left arm, Patient Position: Sitting, Cuff Size: Adult)   Pulse (!) 53   Ht 5' 8" (1 727 m) Comment: verbal  Wt 77 8 kg (171 lb 8 oz)   BMI 26 08 kg/m²      REVIEW OF SYSTEMS:  Constitutional: Negative  HEENT: Negative  Respiratory: Negative  Skin: Negative  Neurological: Negative  Psychiatric/Behavioral: Negative  Musculoskeletal: Negative except for that mentioned in the HPI  /80 (BP Location: Left arm, Patient Position: Sitting, Cuff Size: Adult)   Pulse (!) 53   Ht 5' 8" (1 727 m) Comment: verbal  Wt 77 8 kg (171 lb 8 oz)   BMI 26 08 kg/m²   Gen: No acute distress, resting comfortably in bed  HEENT: Eyes clear, moist mucus membranes, hearing intact  Respiratory: No audible wheezing or stridor  Cardiovascular: Well Perfused peripherally, 2+ distal pulse  Abdomen: nondistended, no peritoneal signs     PHYSICAL EXAM:  Left small finger   Sutures in place clean dry intact  Sutures removed   No erythema or warmth   No triggering of the small finger  Sensation intact  Brisk cap refill    IMAGING:  No imaging    ASSESSMENT AND PLAN:  67 y o  male  status post left small trigger finger release on 11/11/2022  He will use the hand and protect the wound  He should keep it dry for another several days Steri-Strips will fall off on their own  Follow up as needed      Scribe Attestation      I,:  Neha Aguilar PA-C am acting as a scribe while in the presence of the attending physician :       I,:  Mague Cook personally performed the services described in this documentation    as scribed in my presence :

## 2022-11-28 ENCOUNTER — PROCEDURE VISIT (OUTPATIENT)
Dept: UROLOGY | Facility: MEDICAL CENTER | Age: 72
End: 2022-11-28

## 2022-11-28 VITALS
BODY MASS INDEX: 26.13 KG/M2 | DIASTOLIC BLOOD PRESSURE: 76 MMHG | HEIGHT: 68 IN | OXYGEN SATURATION: 99 % | HEART RATE: 51 BPM | WEIGHT: 172.4 LBS | SYSTOLIC BLOOD PRESSURE: 132 MMHG

## 2022-11-28 DIAGNOSIS — N40.1 BENIGN PROSTATIC HYPERPLASIA WITH URINARY OBSTRUCTION: ICD-10-CM

## 2022-11-28 DIAGNOSIS — N13.8 BENIGN PROSTATIC HYPERPLASIA WITH URINARY OBSTRUCTION: ICD-10-CM

## 2022-11-28 DIAGNOSIS — Z85.51 PERSONAL HISTORY OF BLADDER CANCER: Primary | ICD-10-CM

## 2022-11-28 RX ORDER — ALFUZOSIN HYDROCHLORIDE 10 MG/1
10 TABLET, EXTENDED RELEASE ORAL DAILY
Qty: 90 TABLET | Refills: 3 | Status: SHIPPED | OUTPATIENT
Start: 2022-11-28

## 2022-11-28 NOTE — LETTER
November 28, 2022     [facility], [address] [facility] [address]  [place]   [address]  [phone]    Patient: Anastasiia Thomas   YOB: 1950   Date of Visit: 11/28/2022       Dear Dr Ro Navarrete: Thank you for referring Vivienne Solitario to me for evaluation  Below are my notes for this consultation  If you have questions, please do not hesitate to call me  I look forward to following your patient along with you  Sincerely,        Dara Mills MD        CC: No Recipients  Dara Mills MD  11/28/2022  2:16 PM  Sign when Signing Visit  Assessment/Plan:    Benign prostatic hyperplasia with urinary obstruction  Patient's symptoms are getting worse  AUA symptom score is 25 and he is not happy with his voiding pattern  Options were discussed  We did discuss procedure such as urolift, GreenLight laser and transurethral resection of the prostate  We also discussed medical therapy  At the conclusion were discussion he elected a trial of medical therapy  I prescribed Uroxatral   I also gave the patient information regarding the GreenLight laser and urolift  He will return in 2 months or so for follow-up  In the interim we will obtain a bladder ultrasound with postvoid residual and measure prostate size  Personal history of bladder cancer  Urinalysis is negative  Cystoscopy does not reveal any evidence of recurrence  We will screen the upper tracts with a renal ultrasound  He will have a repeat cystoscopy 1 year  Diagnoses and all orders for this visit:    Personal history of bladder cancer  -     POCT urine dip auto non-scope  -     Cystoscopy  -     US kidney and bladder with pvr; Future    Benign prostatic hyperplasia with urinary obstruction  -     POCT urine dip auto non-scope  -     alfuzosin (UROXATRAL) 10 mg 24 hr tablet; Take 1 tablet (10 mg total) by mouth daily  -     PSA Total, Diagnostic;  Future  -     US kidney and bladder with pvr; Future         Subjective: Patient ID: Angela Pat is a 67 y o  male  Benign Prostatic Hypertrophy  This is a chronic problem  The current episode started more than 1 year ago  The problem has been gradually worsening since onset  Irritative symptoms include nocturia (Nocturia x 2-3)  Irritative symptoms do not include frequency or urgency  Obstructive symptoms include incomplete emptying, an intermittent stream, a slower stream and straining  Obstructive symptoms do not include dribbling  Associated symptoms include hesitancy  Pertinent negatives include no chills, dysuria, genital pain, hematuria, nausea or vomiting  AUA score is 20-35  His sexual activity is non-contributory to the current illness  Nothing aggravates the symptoms  Past treatments include nothing  The following portions of the patient's history were reviewed and updated as appropriate: allergies, current medications, past family history, past medical history, past social history, past surgical history and problem list     Review of Systems   Constitutional: Negative for chills, diaphoresis, fatigue and fever  HENT: Negative  Eyes: Negative  Respiratory: Negative  Cardiovascular: Negative  Gastrointestinal: Negative for nausea and vomiting  Endocrine: Negative  Genitourinary: Positive for hesitancy, incomplete emptying and nocturia (Nocturia x 2-3)  Negative for dysuria, frequency, hematuria and urgency  See HPI   Musculoskeletal: Negative  Skin: Negative  Allergic/Immunologic: Negative  Neurological: Negative  Hematological: Negative  Psychiatric/Behavioral: Negative         AUA SYMPTOM SCORE    Flowsheet Row Most Recent Value   AUA SYMPTOM SCORE    How often have you had a sensation of not emptying your bladder completely after you finished urinating? 4 (P)     How often have you had to urinate again less than two hours after you finished urinating? 3 (P)     How often have you found you stopped and started again several times when you urinate? 4 (P)     How often have you found it difficult to postpone urination? 2 (P)     How often have you had a weak urinary stream? 4 (P)     How often have you had to push or strain to begin urination? 4 (P)     How many times did you most typically get up to urinate from the time you went to bed at night until the time you got up in the morning? 4 (P)     Quality of Life: If you were to spend the rest of your life with your urinary condition just the way it is now, how would you feel about that? 4 (P)     AUA SYMPTOM SCORE 25 (P)         Objective:      /76 (BP Location: Left arm, Patient Position: Sitting, Cuff Size: Adult)   Pulse (!) 51   Ht 5' 8" (1 727 m)   Wt 78 2 kg (172 lb 6 4 oz)   SpO2 99%   BMI 26 21 kg/m²         Physical Exam  Vitals reviewed  Constitutional:       General: He is not in acute distress  Appearance: Normal appearance  He is well-developed, normal weight and well-nourished  He is not ill-appearing, toxic-appearing or diaphoretic  HENT:      Head: Normocephalic and atraumatic  Eyes:      General: No scleral icterus  Conjunctiva/sclera: Conjunctivae normal    Cardiovascular:      Rate and Rhythm: Normal rate  Pulmonary:      Effort: Pulmonary effort is normal    Abdominal:      General: Bowel sounds are normal  There is no distension  Palpations: Abdomen is soft  There is no mass  Tenderness: There is no abdominal tenderness  There is no CVA tenderness, right CVA tenderness, left CVA tenderness, guarding or rebound  Hernia: No hernia is present  Genitourinary:     Penis: Normal  No phimosis or hypospadias  Testes: Normal          Right: Mass not present  Left: Mass not present  Rectum: Normal       Comments: Prostate 2 X enlarged and palpably benign  Musculoskeletal:         General: No edema  Normal range of motion  Cervical back: Normal range of motion and neck supple     Skin:     General: Skin is warm and dry  Neurological:      General: No focal deficit present  Mental Status: He is alert and oriented to person, place, and time  Psychiatric:         Mood and Affect: Mood and affect and mood normal          Behavior: Behavior normal          Thought Content: Thought content normal          Judgment: Judgment normal            Cystoscopy     Date/Time 11/28/2022 2:02 PM     Performed by  Adrian Sevilla MD     Authorized by Adrian Sevilla MD      Universal Protocol:  Consent: Verbal consent obtained  Written consent obtained  Risks and benefits: risks, benefits and alternatives were discussed  Consent given by: patient  Patient understanding: patient states understanding of the procedure being performed  Patient consent: the patient's understanding of the procedure matches consent given  Procedure consent: procedure consent matches procedure scheduled  Patient identity confirmed: verbally with patient        Procedure Details:  Procedure type: cystoscopy    Patient tolerance: Patient tolerated the procedure well with no immediate complications    Additional Procedure Details:      Patient presents for cystoscopy  I have discussed the reasons for doing the exam, and the potential risks and complications  Patient expressed understanding, and signed informed consent document  The patient was carefully  positioned supine on the examining table  Sterile preparation was performed on the urethra  Xylocaine jelly was instilled and left  Indwelling for the procedure  The 13 [place]ian flexible cystoscope was passed with the following findings:      Urethra:  Normal without stricture    Prostate:  lateral lobes -bilobar obstruction approximately 30 g, and no significant median lobe                   Bladder:  Trabeculated, no lesions, tumor, or stones  Scar from previous resection is evident    No evidence of recurrence     Residual urine:  Small    Patient tolerated the procedure well and was escorted from the examining table

## 2022-11-28 NOTE — PROGRESS NOTES
Assessment/Plan:    Benign prostatic hyperplasia with urinary obstruction  Patient's symptoms are getting worse  AUA symptom score is 25 and he is not happy with his voiding pattern  Options were discussed  We did discuss procedure such as urolift, GreenLight laser and transurethral resection of the prostate  We also discussed medical therapy  At the conclusion were discussion he elected a trial of medical therapy  I prescribed Uroxatral   I also gave the patient information regarding the GreenLight laser and urolift  He will return in 2 months or so for follow-up  In the interim we will obtain a bladder ultrasound with postvoid residual and measure prostate size  Personal history of bladder cancer  Urinalysis is negative  Cystoscopy does not reveal any evidence of recurrence  We will screen the upper tracts with a renal ultrasound  He will have a repeat cystoscopy 1 year  Diagnoses and all orders for this visit:    Personal history of bladder cancer  -     POCT urine dip auto non-scope  -     Cystoscopy  -     US kidney and bladder with pvr; Future    Benign prostatic hyperplasia with urinary obstruction  -     POCT urine dip auto non-scope  -     alfuzosin (UROXATRAL) 10 mg 24 hr tablet; Take 1 tablet (10 mg total) by mouth daily  -     PSA Total, Diagnostic; Future  -     US kidney and bladder with pvr; Future          Subjective:      Patient ID: Wayne Nicholson is a 67 y o  male  Benign Prostatic Hypertrophy  This is a chronic problem  The current episode started more than 1 year ago  The problem has been gradually worsening since onset  Irritative symptoms include nocturia (Nocturia x 2-3)  Irritative symptoms do not include frequency or urgency  Obstructive symptoms include incomplete emptying, an intermittent stream, a slower stream and straining  Obstructive symptoms do not include dribbling  Associated symptoms include hesitancy   Pertinent negatives include no chills, dysuria, genital pain, hematuria, nausea or vomiting  AUA score is 20-35  His sexual activity is non-contributory to the current illness  Nothing aggravates the symptoms  Past treatments include nothing  The following portions of the patient's history were reviewed and updated as appropriate: allergies, current medications, past family history, past medical history, past social history, past surgical history and problem list     Review of Systems   Constitutional: Negative for chills, diaphoresis, fatigue and fever  HENT: Negative  Eyes: Negative  Respiratory: Negative  Cardiovascular: Negative  Gastrointestinal: Negative for nausea and vomiting  Endocrine: Negative  Genitourinary: Positive for hesitancy, incomplete emptying and nocturia (Nocturia x 2-3)  Negative for dysuria, frequency, hematuria and urgency  See HPI   Musculoskeletal: Negative  Skin: Negative  Allergic/Immunologic: Negative  Neurological: Negative  Hematological: Negative  Psychiatric/Behavioral: Negative          AUA SYMPTOM SCORE    Flowsheet Row Most Recent Value   AUA SYMPTOM SCORE    How often have you had a sensation of not emptying your bladder completely after you finished urinating? 4 (P)     How often have you had to urinate again less than two hours after you finished urinating? 3 (P)     How often have you found you stopped and started again several times when you urinate? 4 (P)     How often have you found it difficult to postpone urination? 2 (P)     How often have you had a weak urinary stream? 4 (P)     How often have you had to push or strain to begin urination? 4 (P)     How many times did you most typically get up to urinate from the time you went to bed at night until the time you got up in the morning? 4 (P)     Quality of Life: If you were to spend the rest of your life with your urinary condition just the way it is now, how would you feel about that? 4 (P)     AUA SYMPTOM SCORE 25 (P) Objective:      /76 (BP Location: Left arm, Patient Position: Sitting, Cuff Size: Adult)   Pulse (!) 51   Ht 5' 8" (1 727 m)   Wt 78 2 kg (172 lb 6 4 oz)   SpO2 99%   BMI 26 21 kg/m²          Physical Exam  Vitals reviewed  Constitutional:       General: He is not in acute distress  Appearance: Normal appearance  He is well-developed, normal weight and well-nourished  He is not ill-appearing, toxic-appearing or diaphoretic  HENT:      Head: Normocephalic and atraumatic  Eyes:      General: No scleral icterus  Conjunctiva/sclera: Conjunctivae normal    Cardiovascular:      Rate and Rhythm: Normal rate  Pulmonary:      Effort: Pulmonary effort is normal    Abdominal:      General: Bowel sounds are normal  There is no distension  Palpations: Abdomen is soft  There is no mass  Tenderness: There is no abdominal tenderness  There is no CVA tenderness, right CVA tenderness, left CVA tenderness, guarding or rebound  Hernia: No hernia is present  Genitourinary:     Penis: Normal  No phimosis or hypospadias  Testes: Normal          Right: Mass not present  Left: Mass not present  Rectum: Normal       Comments: Prostate 2 X enlarged and palpably benign  Musculoskeletal:         General: No edema  Normal range of motion  Cervical back: Normal range of motion and neck supple  Skin:     General: Skin is warm and dry  Neurological:      General: No focal deficit present  Mental Status: He is alert and oriented to person, place, and time  Psychiatric:         Mood and Affect: Mood and affect and mood normal          Behavior: Behavior normal          Thought Content: Thought content normal          Judgment: Judgment normal             Cystoscopy     Date/Time 11/28/2022 2:02 PM     Performed by  Katherin Shone, MD     Authorized by Katherin Shone, MD      Universal Protocol:  Consent: Verbal consent obtained  Written consent obtained    Risks and benefits: risks, benefits and alternatives were discussed  Consent given by: patient  Patient understanding: patient states understanding of the procedure being performed  Patient consent: the patient's understanding of the procedure matches consent given  Procedure consent: procedure consent matches procedure scheduled  Patient identity confirmed: verbally with patient        Procedure Details:  Procedure type: cystoscopy    Patient tolerance: Patient tolerated the procedure well with no immediate complications    Additional Procedure Details:      Patient presents for cystoscopy  I have discussed the reasons for doing the exam, and the potential risks and complications  Patient expressed understanding, and signed informed consent document  The patient was carefully  positioned supine on the examining table  Sterile preparation was performed on the urethra  Xylocaine jelly was instilled and left  Indwelling for the procedure  The 13 Thai flexible cystoscope was passed with the following findings:      Urethra:  Normal without stricture    Prostate:  lateral lobes -bilobar obstruction approximately 30 g, and no significant median lobe                   Bladder:  Trabeculated, no lesions, tumor, or stones  Scar from previous resection is evident  No evidence of recurrence     Residual urine:  Small    Patient tolerated the procedure well and was escorted from the examining table

## 2022-11-28 NOTE — ASSESSMENT & PLAN NOTE
Urinalysis is negative  Cystoscopy does not reveal any evidence of recurrence  We will screen the upper tracts with a renal ultrasound  He will have a repeat cystoscopy 1 year

## 2022-11-28 NOTE — ASSESSMENT & PLAN NOTE
Patient's symptoms are getting worse  AUA symptom score is 25 and he is not happy with his voiding pattern  Options were discussed  We did discuss procedure such as urolift, GreenLight laser and transurethral resection of the prostate  We also discussed medical therapy  At the conclusion were discussion he elected a trial of medical therapy  I prescribed Uroxatral   I also gave the patient information regarding the GreenLight laser and urolift  He will return in 2 months or so for follow-up  In the interim we will obtain a bladder ultrasound with postvoid residual and measure prostate size

## 2022-12-01 ENCOUNTER — HOSPITAL ENCOUNTER (OUTPATIENT)
Dept: ULTRASOUND IMAGING | Facility: HOSPITAL | Age: 72
Discharge: HOME | End: 2022-12-01
Attending: UROLOGY

## 2022-12-01 DIAGNOSIS — N13.8 BENIGN PROSTATIC HYPERPLASIA WITH URINARY OBSTRUCTION: ICD-10-CM

## 2022-12-01 DIAGNOSIS — Z85.51 PERSONAL HISTORY OF BLADDER CANCER: ICD-10-CM

## 2022-12-01 DIAGNOSIS — N40.1 BENIGN PROSTATIC HYPERPLASIA WITH URINARY OBSTRUCTION: ICD-10-CM

## 2022-12-22 ENCOUNTER — TELEPHONE (OUTPATIENT)
Dept: UROLOGY | Facility: MEDICAL CENTER | Age: 72
End: 2022-12-22

## 2022-12-22 NOTE — TELEPHONE ENCOUNTER
Left message for pt to call back re: appt on 2-3-23 with dr Serenity Alcazar needs to be rescheduled due to dr being out of office a couple months  When pt calls back he can be rescheduled with either dr Serenity Alcazar in April or another provider around the same time frame of early February

## 2023-01-19 NOTE — TELEPHONE ENCOUNTER
3rd msg left: advised pt this appt will be cancelled  Gave office # for pt to call back if he wants to r/s: he can either wait for dr Yue Hawley in April or r/s with a different provider sooner

## 2023-03-06 ENCOUNTER — OFFICE VISIT (OUTPATIENT)
Dept: FAMILY MEDICINE CLINIC | Facility: CLINIC | Age: 73
End: 2023-03-06

## 2023-03-06 VITALS
BODY MASS INDEX: 26.83 KG/M2 | WEIGHT: 177 LBS | HEIGHT: 68 IN | SYSTOLIC BLOOD PRESSURE: 130 MMHG | DIASTOLIC BLOOD PRESSURE: 74 MMHG | HEART RATE: 68 BPM

## 2023-03-06 DIAGNOSIS — I10 ESSENTIAL HYPERTENSION: ICD-10-CM

## 2023-03-06 DIAGNOSIS — H53.2 DOUBLE VISION: Primary | ICD-10-CM

## 2023-03-06 DIAGNOSIS — F33.42 MAJOR DEPRESSIVE DISORDER, RECURRENT, IN FULL REMISSION (HCC): ICD-10-CM

## 2023-03-06 DIAGNOSIS — R42 DIZZINESS: ICD-10-CM

## 2023-03-06 NOTE — PATIENT INSTRUCTIONS
1  Double vision  -     MRI brain wo mra head and neck wo; Future; Expected date: 03/06/2023  -     MRI brain and orbits wo and w contrast; Future; Expected date: 03/06/2023    2  Dizziness  -     MRI brain wo mra head and neck wo; Future; Expected date: 03/06/2023  -     MRI brain and orbits wo and w contrast; Future; Expected date: 03/06/2023    3  Major depressive disorder, recurrent, in full remission [facility])  Assessment & Plan:  Sees psych  4  Essential hypertension  Assessment & Plan:  BP very stable today

## 2023-03-06 NOTE — PROGRESS NOTES
Name: Hair Floyd      : 1950      MRN: [id number]  Encounter Provider: Inocencio Wilkins PA-C  Encounter Date: 3/6/2023   Encounter department: [facility] PRIMARY CARE    Assessment & Plan   Three episodes of double vision with dizziness  Must rule out TIA/CVA and so will have pt get ASAP MRI and MRA  1  Double vision  -     MRI brain wo mra head and neck wo; Future; Expected date: 2023  -     MRI brain and orbits wo and w contrast; Future; Expected date: 2023    2  Dizziness  -     MRI brain wo mra head and neck wo; Future; Expected date: 2023  -     MRI brain and orbits wo and w contrast; Future; Expected date: 2023    3  Major depressive disorder, recurrent, in full remission [facility])  Assessment & Plan:  Sees psych  4  Essential hypertension  Assessment & Plan:  BP very stable today  BMI Counseling: Body mass index is 26 91 kg/m²  The BMI is above normal  Nutrition recommendations include decreasing portion sizes, encouraging healthy choices of fruits and vegetables and decreasing fast food intake  Rationale for BMI follow-up plan is due to patient being overweight or obese  Subjective      PT here today with c/o double vision and dizziness for two weeks mainly  Patient states the first episode happened roughly 2 weeks ago after he been driving for a long period of time and stopped at a rest area he states as soon as he got into the facility he had to hold onto something because he had severe double vision felt like he was drunk and could not move for roughly 3 minutes with a severe headache  He was not with anybody else to document any other symptoms that he may not have known he was experiencing like facial features or speech  Yesterday he states that he had similar findings that only lasted a shorter amount of time and then this morning he had a recurrence that lasted even shorter time    Patient's wife states that she notices him shaking with his hands head and body lately  Patient only wears reading glasses  Does not complain about any difficulty in thought process speech or strength that he is aware of  Review of Systems   Constitutional: Negative  HENT: Negative  Eyes: Positive for visual disturbance  Respiratory: Negative  Cardiovascular: Negative  Gastrointestinal: Negative  Endocrine: Negative  Genitourinary: Negative  Musculoskeletal: Negative  Skin: Negative  Allergic/Immunologic: Negative  Neurological: Positive for dizziness  Hematological: Negative  Psychiatric/Behavioral: Negative  Current Outpatient Medications on File Prior to Visit   Medication Sig   • alfuzosin (UROXATRAL) 10 mg 24 hr tablet Take 1 tablet (10 mg total) by mouth daily   • ALPRAZolam (XANAX) 0 25 mg tablet daily as needed   • buPROPion (WELLBUTRIN SR) 150 mg 12 hr tablet Take 150 mg by mouth 2 (two) times a day   • escitalopram (LEXAPRO) 5 mg tablet Take 1 tablet by mouth daily   • fluticasone (FLONASE) 50 mcg/act nasal spray 1 spray into each nostril daily   • lamoTRIgine (LaMICtal) 200 MG tablet Take 1 tablet by mouth 2 (two) times a day   • levothyroxine 125 mcg tablet Take 1 tablet (125 mcg total) by mouth daily   • losartan (COZAAR) 50 mg tablet Take 1 tablet (50 mg total) by mouth daily   • traZODone (DESYREL) 50 mg tablet Take 50 mg by mouth     • [DISCONTINUED] ibuprofen (MOTRIN) 800 mg tablet Take 1 tablet (800 mg total) by mouth every 8 (eight) hours as needed for mild pain       Objective     /74   Pulse 68   Ht 5' 8" (1 727 m)   Wt 80 3 kg (177 lb)   BMI 26 91 kg/m²     Physical Exam  Vitals and nursing note reviewed  Constitutional:       General: He is not in acute distress  Appearance: He is well-developed  He is not diaphoretic  HENT:      Head: Normocephalic and atraumatic  Eyes:      General: Lids are normal  Vision grossly intact  Gaze aligned appropriately           Right eye: No discharge  Left eye: No discharge  Extraocular Movements: Extraocular movements intact  Conjunctiva/sclera: Conjunctivae normal    Neck:      Vascular: No carotid bruit  Cardiovascular:      Rate and Rhythm: Normal rate and regular rhythm  Heart sounds: Normal heart sounds  No murmur heard  No friction rub  No gallop  Pulmonary:      Effort: Pulmonary effort is normal  No respiratory distress  Breath sounds: Normal breath sounds  No wheezing or rales  Musculoskeletal:      Comments: Pt is noted to be slightly off balance when walking to check out  Fine tremor to head and hands bilaterally  Strength WNL upper and lower extremities robert  Skin:     General: Skin is warm and dry  Neurological:      Mental Status: He is alert and oriented to person, place, and time  Cranial Nerves: Cranial nerves 2-12 are intact  Motor: Tremor present  No pronator drift  Coordination: Romberg sign negative  Finger-Nose-Finger Test and Heel to [facility] Test normal       Gait: Gait normal       Deep Tendon Reflexes: Reflexes are normal and symmetric  Comments: Slight off balance upon walking to check out      Psychiatric:         Judgment: Judgment normal        True SANDRA Salinas

## 2023-03-15 ENCOUNTER — HOSPITAL ENCOUNTER (OUTPATIENT)
Dept: RADIOLOGY | Facility: IMAGING CENTER | Age: 73
Discharge: HOME | End: 2023-03-15

## 2023-03-15 DIAGNOSIS — H53.2 DOUBLE VISION: ICD-10-CM

## 2023-03-15 DIAGNOSIS — R42 DIZZINESS: ICD-10-CM

## 2023-03-15 RX ADMIN — GADOBUTROL 8 ML: 604.72 INJECTION INTRAVENOUS at 14:56

## 2023-03-18 ENCOUNTER — OFFICE VISIT (OUTPATIENT)
Dept: URGENT CARE | Facility: MEDICAL CENTER | Age: 73
End: 2023-03-18

## 2023-03-18 VITALS
DIASTOLIC BLOOD PRESSURE: 72 MMHG | HEIGHT: 68 IN | TEMPERATURE: 98.8 F | SYSTOLIC BLOOD PRESSURE: 138 MMHG | BODY MASS INDEX: 26.83 KG/M2 | RESPIRATION RATE: 20 BRPM | HEART RATE: 60 BPM | WEIGHT: 177 LBS

## 2023-03-18 DIAGNOSIS — H10.33 ACUTE CONJUNCTIVITIS OF BOTH EYES, UNSPECIFIED ACUTE CONJUNCTIVITIS TYPE: Primary | ICD-10-CM

## 2023-03-18 DIAGNOSIS — J06.9 VIRAL URI WITH COUGH: ICD-10-CM

## 2023-03-18 RX ORDER — POLYMYXIN B SULFATE AND TRIMETHOPRIM 1; 10000 MG/ML; [USP'U]/ML
1 SOLUTION OPHTHALMIC
Qty: 10 ML | Refills: 0 | Status: SHIPPED | OUTPATIENT
Start: 2023-03-18

## 2023-03-18 NOTE — PROGRESS NOTES
[facility]        NAME: Tai Parr is a 67 y o  male  : 1950    MRN: [id number]  DATE: 2023  TIME: 11:47 AM    Assessment and Plan   Acute conjunctivitis of both eyes, unspecified acute conjunctivitis type [H10 33]  1  Acute conjunctivitis of both eyes, unspecified acute conjunctivitis type  polymyxin b-trimethoprim (POLYTRIM) ophthalmic solution      2  Viral URI with cough          Prescribed eye drops/ointment - take as directed  Warm washcloth to the eyes in the morning to wipe off discharge  Cool compresses to the eyes for pain  Avoid touching eyes as much as possible, this is contagious! Wash hands frequently  For cold symptoms, treat symptomatically as below  Fever/Body Aches: We recommend you take 600mg ibuprofen every 6 hours or tylenol 650mg every 6 hours as needed for fever  If needed, you can alternate these medications so that you take one medication every 3 hours  For instance, at noon take ibuprofen, then at 3pm take tylenol, then at 6pm take ibuprofen  Cough: Delsym, an over the counter cough medication may be used every 12 hours as needed  Mucinex XR (guafenisen) 600 mg tablets may be used to thin out the mucous to make it easier to cough up  You may take 1-2 tablets twice per day as needed  Sore Throat: Salt water gargles with 1 teaspoon of salt dissolved in 6-8 oz of water as needed can help with a sore throat, as can honey, drinking plenty of liquids, eating soft foods  If severe, can utilize OTC chloraseptic spray  Nasal Congestion: Over the counter allergy medication like Claritin, Allegra or Zyrtec can help with nasal congestion and post nasal drip  Over the counter saline or steroid nasal sprays like Flonase can help with nasal congestion and post nasal drip as well  Over the counter decongestants such as Sudafed may also help      Go to the Emergency Department if you experience worsening cough, fever 100 4 ° F or greater  that is not controlled by Tylenol or Ibuprofen, recurrent vomiting, chest pain, shortness of breath, or any other concerning symptoms  Follow up with PCP in 3-5 days  Patient Instructions       Follow up with PCP in 3-5 days  Proceed to  ER if symptoms worsen  Chief Complaint     Chief Complaint   Patient presents with   • Eye Problem     Patient has had a cough and cold and noticed his eyes were red for 2 days and crusted today         History of Present Illness       Eye Problem   Both eyes are affected  This is a new problem  Episode onset: 2 days ago  The problem has been gradually worsening  There was no injury mechanism  There is known exposure to pink eye  He does not wear contacts  Associated symptoms include an eye discharge (eyes crusted shut this AM), eye redness and itching  Pertinent negatives include no blurred vision, double vision, fever, nausea, photophobia or vomiting  He has tried nothing for the symptoms  Cough  This is a new problem  Episode onset: over one week ago  The problem has been gradually worsening  The cough is productive of purulent sputum  Associated symptoms include eye redness, postnasal drip, rhinorrhea and a sore throat  Pertinent negatives include no chills, ear pain, fever, myalgias, rash, shortness of breath or wheezing  Treatments tried: OTC cough medicine  The treatment provided mild relief  Review of Systems   Review of Systems   Constitutional: Positive for fatigue  Negative for appetite change, chills and fever  HENT: Positive for congestion, postnasal drip, rhinorrhea and sore throat  Negative for ear discharge, ear pain, sinus pressure and sinus pain  Eyes: Positive for discharge (eyes crusted shut this AM), redness and itching  Negative for blurred vision, double vision, photophobia and pain  Respiratory: Positive for cough  Negative for chest tightness, shortness of breath and wheezing      Gastrointestinal: Negative for abdominal pain, diarrhea, nausea and vomiting  Musculoskeletal: Negative for myalgias  Skin: Negative for rash           Current Medications       Current Outpatient Medications:   •  polymyxin b-trimethoprim (POLYTRIM) ophthalmic solution, Administer 1 drop to both eyes every 4 (four) hours while awake, Disp: 10 mL, Rfl: 0  •  alfuzosin (UROXATRAL) 10 mg 24 hr tablet, Take 1 tablet (10 mg total) by mouth daily, Disp: 90 tablet, Rfl: 3  •  ALPRAZolam (XANAX) 0 25 mg tablet, daily as needed, Disp: , Rfl:   •  buPROPion (WELLBUTRIN SR) 150 mg 12 hr tablet, Take 150 mg by mouth 2 (two) times a day, Disp: , Rfl: 0  •  escitalopram (LEXAPRO) 5 mg tablet, Take 1 tablet by mouth daily, Disp: , Rfl:   •  fluticasone (FLONASE) 50 mcg/act nasal spray, 1 spray into each nostril daily, Disp: 11 1 mL, Rfl: 11  •  lamoTRIgine (LaMICtal) 200 MG tablet, Take 1 tablet by mouth 2 (two) times a day, Disp: , Rfl:   •  levothyroxine 125 mcg tablet, Take 1 tablet (125 mcg total) by mouth daily, Disp: 90 tablet, Rfl: 3  •  losartan (COZAAR) 50 mg tablet, Take 1 tablet (50 mg total) by mouth daily, Disp: 90 tablet, Rfl: 1  •  traZODone (DESYREL) 50 mg tablet, Take 50 mg by mouth  , Disp: , Rfl:     Current Allergies     Allergies as of 03/18/2023   • (No Known Allergies)            The following portions of the patient's history were reviewed and updated as appropriate: allergies, current medications, past family history, past medical history, past social history, past surgical history and problem list      Past Medical History:   Diagnosis Date   • Anemia    • Bladder cancer ([facility] [address] ) 2002   • BPH with obstruction/lower urinary tract symptoms    • Depression    • Erectile dysfunction    • Gross hematuria    • Hypertension    • Hypothyroid    • Malignant neoplasm of lateral wall of urinary bladder ([facility] [address] )    • Nocturia        Past Surgical History:   Procedure Laterality Date   • BLADDER SURGERY  05/2002    bladder cancer surgery   • CHOLECYSTECTOMY      Laparoscopic, age 36 something, per allscripts   • CYSTECTOMY, PARTIAL     • CYSTOSCOPY  2010    w/ biopsy   • CYSTOSCOPY  2012,2013,2015,2016   • KNEE ARTHROSCOPY      Therapeutic, Last assessed: 5/25/16   • MOUTH SURGERY  09/2020   • OK TENDON SHEATH INCISION Left 11/11/2022    Procedure: RELEASE LEFT SMALL TRIGGER FINGER;  Surgeon: Jaimee Kirkpatrick;  Location:  MAIN OR;  Service: Orthopedics       Family History   Problem Relation Age of Onset   • Alzheimer's disease Mother    • Breast cancer Mother    • Anxiety disorder Mother         generalized   • Stroke Mother         syndrome   • Cancer Brother         bladder   • Prostate cancer Brother    • Hypertension Family    • Cancer Family    • Alzheimer's disease Father    • Hypertension Father          Medications have been verified  Objective   /72   Pulse 60   Temp 98 8 °F (37 1 °C)   Resp 20   Ht 5' 8" (1 727 m)   Wt 80 3 kg (177 lb)   BMI 26 91 kg/m²        Physical Exam     Physical Exam  Vitals and nursing note reviewed  Constitutional:       General: He is not in acute distress  Appearance: Normal appearance  He is not ill-appearing  HENT:      Right Ear: Tympanic membrane, ear canal and external ear normal       Left Ear: Tympanic membrane, ear canal and external ear normal       Nose: Congestion and rhinorrhea present  Mouth/Throat:      Mouth: Mucous membranes are moist       Pharynx: No oropharyngeal exudate or posterior oropharyngeal erythema  Eyes:      General:         Right eye: Discharge present  Left eye: Discharge present  Extraocular Movements: Extraocular movements intact  Conjunctiva/sclera:      Right eye: Right conjunctiva is injected  Left eye: Left conjunctiva is injected  Pupils: Pupils are equal, round, and reactive to light  Cardiovascular:      Rate and Rhythm: Normal rate and regular rhythm  Pulses: Normal pulses  Heart sounds: Normal heart sounds     Pulmonary:      Effort: Pulmonary effort is normal  No respiratory distress  Breath sounds: Normal breath sounds  No wheezing, rhonchi or rales  Musculoskeletal:      Cervical back: Neck supple  No tenderness  Lymphadenopathy:      Cervical: No cervical adenopathy  Skin:     General: Skin is warm and dry  Neurological:      Mental Status: He is alert

## 2023-03-18 NOTE — PATIENT INSTRUCTIONS
Prescribed eye drops/ointment - take as directed  Warm washcloth to the eyes in the morning to wipe off discharge  Cool compresses to the eyes for pain  Avoid touching eyes as much as possible, this is contagious! Wash hands frequently  For cold symptoms, treat symptomatically as below  Fever/Body Aches: We recommend you take 600mg ibuprofen every 6 hours or tylenol 650mg every 6 hours as needed for fever  If needed, you can alternate these medications so that you take one medication every 3 hours  For instance, at noon take ibuprofen, then at 3pm take tylenol, then at 6pm take ibuprofen  Cough: Delsym, an over the counter cough medication may be used every 12 hours as needed  Mucinex XR (guafenisen) 600 mg tablets may be used to thin out the mucous to make it easier to cough up  You may take 1-2 tablets twice per day as needed  Sore Throat: Salt water gargles with 1 teaspoon of salt dissolved in 6-8 oz of water as needed can help with a sore throat, as can honey, drinking plenty of liquids, eating soft foods  If severe, can utilize OTC chloraseptic spray  Nasal Congestion: Over the counter allergy medication like Claritin, Allegra or Zyrtec can help with nasal congestion and post nasal drip  Over the counter saline or steroid nasal sprays like Flonase can help with nasal congestion and post nasal drip as well  Over the counter decongestants such as Sudafed may also help  Go to the Emergency Department if you experience worsening cough, fever 100 4 ° F or greater  that is not controlled by Tylenol or Ibuprofen, recurrent vomiting, chest pain, shortness of breath, or any other concerning symptoms  Follow up with PCP in 3-5 days  Conjunctivitis   AMBULATORY CARE:   Conjunctivitis , or pink eye, is inflammation of your conjunctiva  The conjunctiva is a thin tissue that covers the front of your eye and the back of your eyelids   The conjunctiva helps protect your eye and keep it moist  Conjunctivitis may be caused by bacteria, allergies, or a virus  If your conjunctivitis is caused by bacteria, it may get better on its own in about 7 days  Viral conjunctivitis can last up to 3 weeks  Common symptoms may include any of the following: You will usually have symptoms in both eyes if your conjunctivitis is caused by allergies  You may also have other allergic symptoms, such as a rash or runny nose  Symptoms will usually start in 1 eye if your conjunctivitis is caused by a virus or bacteria  Redness in the whites of your eye    Itching in your eye or around your eye    Feeling like there is something in your eye    Watery or thick, sticky discharge    Crusty eyelids when you wake up in the morning    Burning, stinging, or swelling in your eye    Pain when you see bright light    Seek care immediately if:   You have worsening eye pain  The swelling in your eye gets worse, even after treatment  Your vision suddenly becomes worse or you cannot see at all  Call your doctor if:   You develop a fever and ear pain  You have tiny bumps or spots of blood on your eye  You have questions or concerns about your condition or care  Treatment  will depend on the cause of your conjunctivitis  You may need antibiotics or allergy medicine as a pill, eye drop, or eye ointment  Manage your symptoms:   Apply a cool compress  Wet a washcloth with cold water and place it on your eye  This will help decrease itching and irritation  Do not wear contact lenses  They can irritate your eye  Throw away the pair you are using and ask when you can wear them again  Use a new pair of lenses when your provider says it is okay  Avoid irritants  Stay away from smoke filled areas  Shield your eyes from wind and sun  Flush your eye  You may need to flush your eye with saline to help decrease your symptoms  Ask for more information on how to flush your eye      Medicines:  Treatment depends on what is causing your conjunctivitis  You may be given any of the following: Allergy medicine  helps decrease itchy, red, swollen eyes caused by allergies  It may be given as a pill, eye drops, or nasal spray  Antibiotics  may be needed if your conjunctivitis is caused by bacteria  This medicine may be given as a pill, eye drops, or eye ointment  Take your medicine as directed  Contact your healthcare provider if you think your medicine is not helping or if you have side effects  Tell your provider if you are allergic to any medicine  Keep a list of the medicines, vitamins, and herbs you take  Include the amounts, and when and why you take them  Bring the list or the pill bottles to follow-up visits  Carry your medicine list with you in case of an emergency  Prevent the spread of conjunctivitis:   Wash your hands with soap and water often  Wash your hands before and after you touch your eyes  Also wash your hands before you prepare or eat food and after you use the bathroom or change a diaper  Avoid allergens  Try to avoid the things that cause your allergies, such as pets, dust, or grass  Avoid contact with others  Do not share towels or washcloths  Try to stay away from others as much as possible  Ask when you can return to work or school  Throw away eye makeup  The bacteria that caused your conjunctivitis can stay in eye makeup  Throw away your current mascara and other eye makeup  Never share mascara or other eye makeup with anyone  Follow up with your doctor as directed:  Write down your questions so you remember to ask them during your visits  © Copyright Jaye [employer]ger 2022 Information is for End User's use only and may not be sold, redistributed or otherwise used for commercial purposes  The above information is an  only  It is not intended as medical advice for individual conditions or treatments   Talk to your doctor, nurse or pharmacist before following any medical regimen to see if it is safe and effective for you  Upper Respiratory Infection   AMBULATORY CARE:   An upper respiratory infection  is also called a cold  Your nose, throat, ears, and sinuses may be affected  You are more likely to get a cold in the winter  Your risk of getting a cold may be increased if you smoke cigarettes or have allergies, such as hay fever  What causes a cold? A cold is caused by a virus  Many viruses can cause a cold, and each is contagious  This means the virus can be easily spread to another person when the sick person coughs or sneezes  The virus can also be spread if you touch an object the virus is on and then touch your eyes, mouth, or nose  Cold symptoms  are usually worst for the first 3 to 5 days  You may have any of the following:  Runny or stuffy nose    Sneezing and coughing    Sore throat or hoarseness    Red, watery, and sore eyes    Fatigue (you feel more tired than usual)    Chills and fever    Headache, body aches, or sore muscles    Call your local emergency number (911 in the [address] [place] [address],[address]) if:   You have chest pain or trouble breathing  Seek care immediately if:   You have a fever over 102ºF (39ºC)  Call your doctor if:   You have a low fever  Your sore throat gets worse or you see white or yellow spots in your throat  Your symptoms get worse after 3 to 5 days or are not better in 14 days  You have a rash anywhere on your skin  You have large, tender lumps in your neck  You have thick, green, or yellow drainage from your nose  You cough up thick yellow, green, or bloody mucus  You have a bad earache  You have questions or concerns about your condition or care  Treatment:  Colds are caused by viruses and do not get better with antibiotics  Most people get better in 7 to 14 days  You may continue to cough for 2 to 3 weeks  The following may help decrease your symptoms:  Decongestants  help reduce nasal congestion and help you breathe more easily   If you take decongestant pills, they may make you feel restless or not able to sleep  Do not use decongestant sprays for more than a few days  Cough suppressants  help reduce coughing  Ask your healthcare provider which type of cough medicine is best for you  NSAIDs , such as ibuprofen, help decrease swelling, pain, and fever  NSAIDs can cause stomach bleeding or kidney problems in certain people  If you take blood thinner medicine, always ask your healthcare provider if NSAIDs are safe for you  Always read the medicine label and follow directions  Acetaminophen  decreases pain and fever  It is available without a doctor's order  Ask how much to take and how often to take it  Follow directions  Read the labels of all other medicines you are using to see if they also contain acetaminophen, or ask your doctor or pharmacist  Acetaminophen can cause liver damage if not taken correctly  Manage a cold:   Rest as much as possible  Slowly start to do more each day  Drink more liquids as directed  Liquids will help thin and loosen mucus so you can cough it up  Liquids will also help prevent dehydration  Liquids that help prevent dehydration include water, fruit juice, and broth  Do not drink liquids that contain caffeine  Caffeine can increase your risk for dehydration  Ask your healthcare provider how much liquid to drink each day  Soothe a sore throat  Gargle with warm salt water  Make salt water by dissolving ¼ teaspoon salt in 1 cup warm water  You may also suck on hard candy or throat lozenges  You may use a sore throat spray  Use a humidifier or vaporizer  Use a cool mist humidifier or a vaporizer to increase air moisture in your home  This may make it easier for you to breathe and help decrease your cough  Use saline nasal drops as directed  These help relieve congestion  Apply petroleum-based jelly around the outside of your nostrils    This can decrease irritation from blowing your nose     Do not smoke  Nicotine and other chemicals in cigarettes and cigars can make your symptoms worse  They can also cause infections such as bronchitis or pneumonia  Ask your healthcare provider for information if you currently smoke and need help to quit  E-cigarettes or smokeless tobacco still contain nicotine  Talk to your healthcare provider before you use these products  Prevent a cold: Wash your hands often  Use soap and water every time you wash your hands  Rub your soapy hands together, lacing your fingers  Use the fingers of one hand to scrub under the nails of the other hand  Wash for at least 20 seconds  Rinse with warm, running water for several seconds  Then dry your hands  Use hand  gel if soap and water are not available  Do not touch your eyes or mouth without washing your hands first          Cover a sneeze or cough  Use a tissue that covers your mouth and nose  Put the used tissue in the trash right away  Use the bend of your arm if a tissue is not available  Wash your hands well with soap and water or use a hand   Do not stand close to anyone who is sneezing or coughing  Try to stay away from others while you are sick  This is especially important during the first 2 to 3 days when the virus is more easily spread  Wait until a fever, cough, or other symptoms are gone before you return to work or other regular activities  Do not share items while you are sick  This includes food, drinks, eating utensils, and dishes  Follow up with your doctor as directed:  Write down your questions so you remember to ask them during your visits  © Copyright [facility] 2022 Information is for End User's use only and may not be sold, redistributed or otherwise used for commercial purposes  The above information is an  only  It is not intended as medical advice for individual conditions or treatments   Talk to your doctor, nurse or pharmacist before following any medical regimen to see if it is safe and effective for you

## 2023-03-20 ENCOUNTER — APPOINTMENT (OUTPATIENT)
Dept: LAB | Facility: MEDICAL CENTER | Age: 73
End: 2023-03-20
Attending: UROLOGY

## 2023-03-20 DIAGNOSIS — N40.1 BENIGN PROSTATIC HYPERPLASIA WITH URINARY OBSTRUCTION: ICD-10-CM

## 2023-03-20 DIAGNOSIS — N13.8 BENIGN PROSTATIC HYPERPLASIA WITH URINARY OBSTRUCTION: ICD-10-CM

## 2023-03-20 LAB — PSA SERPL-MCNC: 5.1 NG/ML (ref 0–4)

## 2023-03-22 ENCOUNTER — TELEPHONE (OUTPATIENT)
Dept: NEUROLOGY | Facility: CLINIC | Age: 73
End: 2023-03-22

## 2023-03-22 DIAGNOSIS — H53.2 DOUBLE VISION: ICD-10-CM

## 2023-03-22 DIAGNOSIS — H47.099 DISORDER OF OPTIC NERVE, UNSPECIFIED LATERALITY: Primary | ICD-10-CM

## 2023-03-22 DIAGNOSIS — R42 DIZZINESS: ICD-10-CM

## 2023-03-22 NOTE — TELEPHONE ENCOUNTER
Johana Palacio  Pt has a STAT referral to be seen I made the appt on 4/18 @ 1:30 with Dr Dalila Lind but if anything opens up sooner can we please try to move him up? Thanks so much

## 2023-03-28 ENCOUNTER — CONSULT (OUTPATIENT)
Dept: NEUROLOGY | Facility: CLINIC | Age: 73
End: 2023-03-28

## 2023-03-28 VITALS
SYSTOLIC BLOOD PRESSURE: 124 MMHG | HEIGHT: 67 IN | DIASTOLIC BLOOD PRESSURE: 82 MMHG | WEIGHT: 177 LBS | HEART RATE: 67 BPM | BODY MASS INDEX: 27.78 KG/M2

## 2023-03-28 DIAGNOSIS — G25.0 ESSENTIAL TREMOR: ICD-10-CM

## 2023-03-28 DIAGNOSIS — H53.2 DOUBLE VISION: ICD-10-CM

## 2023-03-28 DIAGNOSIS — G62.9 NEUROPATHY: ICD-10-CM

## 2023-03-28 DIAGNOSIS — H47.099 DISORDER OF OPTIC NERVE, UNSPECIFIED LATERALITY: ICD-10-CM

## 2023-03-28 DIAGNOSIS — E23.7 PITUITARY LESION (HCC): ICD-10-CM

## 2023-03-28 DIAGNOSIS — H53.2 DIPLOPIA: Primary | ICD-10-CM

## 2023-03-28 DIAGNOSIS — G93.2 IDIOPATHIC INTRACRANIAL HYPERTENSION: ICD-10-CM

## 2023-03-28 DIAGNOSIS — R26.89 OTHER ABNORMALITIES OF GAIT AND MOBILITY: ICD-10-CM

## 2023-03-28 DIAGNOSIS — G60.9 HEREDITARY AND IDIOPATHIC NEUROPATHY, UNSPECIFIED: ICD-10-CM

## 2023-03-28 DIAGNOSIS — R42 DIZZINESS: ICD-10-CM

## 2023-03-28 DIAGNOSIS — R26.89 BALANCE PROBLEM: ICD-10-CM

## 2023-03-28 NOTE — TELEPHONE ENCOUNTER
[place] -  Case reviewed - patient requires evaluation for Idiopathic intracranial hypertension  This is not stat consult to [facility] team, Headache team is the one who provide services for that condition

## 2023-03-28 NOTE — TELEPHONE ENCOUNTER
ADD ON  Spoke with Pts wife  They accepted the appt with Dr Beth Jackson in [facility] today at 1:00

## 2023-03-28 NOTE — TELEPHONE ENCOUNTER
Called Pt and LMOM offering appt today with Dr Chen Wilkes @ 1:00 in [facility]  Appt is on hold   If not please offer  STAT appt with headache team

## 2023-03-28 NOTE — PATIENT INSTRUCTIONS
When looking at your eyes, your right eye lags behind the left eye when looking to the right and when looking all the way to the left  Please see Ophthalmology and have them forward their notes to us   Would like them to look at your visual fields, eye movements, swelling in the back of the eye, and overall eye health  Repeat MRI in 3 months to reassess pituitary gland  Please get labs done at your convenience to check for other causes of neuropathy  Balance physical therapy  Follow up in 3 months

## 2023-03-28 NOTE — TELEPHONE ENCOUNTER
Greatly appreciated - patient will be urgently evaluated by Dr Eloisa Martinez with Dr Willian Harp ( headache team)

## 2023-04-06 ENCOUNTER — APPOINTMENT (OUTPATIENT)
Dept: LAB | Facility: MEDICAL CENTER | Age: 73
End: 2023-04-06

## 2023-04-06 DIAGNOSIS — E03.9 ACQUIRED HYPOTHYROIDISM: ICD-10-CM

## 2023-04-06 DIAGNOSIS — G62.9 NEUROPATHY: ICD-10-CM

## 2023-04-06 DIAGNOSIS — Z85.51 PERSONAL HISTORY OF BLADDER CANCER: ICD-10-CM

## 2023-04-06 DIAGNOSIS — G60.9 HEREDITARY AND IDIOPATHIC NEUROPATHY, UNSPECIFIED: ICD-10-CM

## 2023-04-06 DIAGNOSIS — E78.2 MIXED HYPERLIPIDEMIA: ICD-10-CM

## 2023-04-06 DIAGNOSIS — R73.9 HYPERGLYCEMIA: ICD-10-CM

## 2023-04-06 DIAGNOSIS — R26.89 OTHER ABNORMALITIES OF GAIT AND MOBILITY: ICD-10-CM

## 2023-04-06 LAB
ALBUMIN SERPL BCP-MCNC: 3.7 G/DL (ref 3.5–5)
ALP SERPL-CCNC: 85 U/L (ref 46–116)
ALT SERPL W P-5'-P-CCNC: 23 U/L (ref 12–78)
ANION GAP SERPL CALCULATED.3IONS-SCNC: 1 MMOL/L (ref 4–13)
AST SERPL W P-5'-P-CCNC: 32 U/L (ref 5–45)
BASOPHILS # BLD AUTO: 0.06 THOUSANDS/ÂΜL (ref 0–0.1)
BASOPHILS NFR BLD AUTO: 1 % (ref 0–1)
BILIRUB SERPL-MCNC: 0.37 MG/DL (ref 0.2–1)
BUN SERPL-MCNC: 18 MG/DL (ref 5–25)
CALCIUM SERPL-MCNC: 9.1 MG/DL (ref 8.3–10.1)
CHLORIDE SERPL-SCNC: 107 MMOL/L (ref 96–108)
CHOLEST SERPL-MCNC: 190 MG/DL
CO2 SERPL-SCNC: 29 MMOL/L (ref 21–32)
CREAT SERPL-MCNC: 1.25 MG/DL (ref 0.6–1.3)
CRP SERPL QL: <3 MG/L
EOSINOPHIL # BLD AUTO: 0.21 THOUSAND/ÂΜL (ref 0–0.61)
EOSINOPHIL NFR BLD AUTO: 3 % (ref 0–6)
ERYTHROCYTE [DISTWIDTH] IN BLOOD BY AUTOMATED COUNT: 12.8 % (ref 11.6–15.1)
ERYTHROCYTE [SEDIMENTATION RATE] IN BLOOD: 29 MM/HOUR (ref 0–19)
FOLATE SERPL-MCNC: 7.8 NG/ML (ref 3.1–17.5)
GFR SERPL CREATININE-BSD FRML MDRD: 57 ML/MIN/1.73SQ M
GLUCOSE P FAST SERPL-MCNC: 106 MG/DL (ref 65–99)
HCT VFR BLD AUTO: 45.3 % (ref 36.5–49.3)
HDLC SERPL-MCNC: 56 MG/DL
HGB BLD-MCNC: 14.1 G/DL (ref 12–17)
IMM GRANULOCYTES # BLD AUTO: 0.02 THOUSAND/UL (ref 0–0.2)
IMM GRANULOCYTES NFR BLD AUTO: 0 % (ref 0–2)
LDLC SERPL CALC-MCNC: 115 MG/DL (ref 0–100)
LYMPHOCYTES # BLD AUTO: 2.07 THOUSANDS/ÂΜL (ref 0.6–4.47)
LYMPHOCYTES NFR BLD AUTO: 32 % (ref 14–44)
MCH RBC QN AUTO: 29 PG (ref 26.8–34.3)
MCHC RBC AUTO-ENTMCNC: 31.1 G/DL (ref 31.4–37.4)
MCV RBC AUTO: 93 FL (ref 82–98)
MONOCYTES # BLD AUTO: 0.6 THOUSAND/ÂΜL (ref 0.17–1.22)
MONOCYTES NFR BLD AUTO: 9 % (ref 4–12)
NEUTROPHILS # BLD AUTO: 3.43 THOUSANDS/ÂΜL (ref 1.85–7.62)
NEUTS SEG NFR BLD AUTO: 55 % (ref 43–75)
NRBC BLD AUTO-RTO: 0 /100 WBCS
PLATELET # BLD AUTO: 341 THOUSANDS/UL (ref 149–390)
PMV BLD AUTO: 9.4 FL (ref 8.9–12.7)
POTASSIUM SERPL-SCNC: 4.8 MMOL/L (ref 3.5–5.3)
PROT SERPL-MCNC: 7.9 G/DL (ref 6.4–8.4)
RBC # BLD AUTO: 4.87 MILLION/UL (ref 3.88–5.62)
SODIUM SERPL-SCNC: 137 MMOL/L (ref 135–147)
TRIGL SERPL-MCNC: 93 MG/DL
TSH SERPL DL<=0.05 MIU/L-ACNC: 4.36 UIU/ML (ref 0.45–4.5)
VIT B12 SERPL-MCNC: 225 PG/ML (ref 100–900)
WBC # BLD AUTO: 6.39 THOUSAND/UL (ref 4.31–10.16)

## 2023-04-07 LAB
EST. AVERAGE GLUCOSE BLD GHB EST-MCNC: 120 MG/DL
HBA1C MFR BLD: 5.8 %

## 2023-04-08 LAB
ALBUMIN SERPL ELPH-MCNC: 5.36 G/DL (ref 3.5–5)
ALBUMIN SERPL ELPH-MCNC: 71.4 % (ref 52–65)
ALBUMIN UR ELPH-MCNC: 100 %
ALPHA1 GLOB MFR UR ELPH: 0 %
ALPHA1 GLOB SERPL ELPH-MCNC: 0.25 G/DL (ref 0.1–0.4)
ALPHA1 GLOB SERPL ELPH-MCNC: 3.3 % (ref 2.5–5)
ALPHA2 GLOB MFR UR ELPH: 0 %
ALPHA2 GLOB SERPL ELPH-MCNC: 0.56 G/DL (ref 0.4–1.2)
ALPHA2 GLOB SERPL ELPH-MCNC: 7.4 % (ref 7–13)
B-GLOBULIN MFR UR ELPH: 0 %
BETA GLOB ABNORMAL SERPL ELPH-MCNC: 0.29 G/DL (ref 0.4–0.8)
BETA1 GLOB SERPL ELPH-MCNC: 3.9 % (ref 5–13)
BETA2 GLOB SERPL ELPH-MCNC: 4.8 % (ref 2–8)
BETA2+GAMMA GLOB SERPL ELPH-MCNC: 0.36 G/DL (ref 0.2–0.5)
GAMMA GLOB ABNORMAL SERPL ELPH-MCNC: 0.69 G/DL (ref 0.5–1.6)
GAMMA GLOB MFR UR ELPH: 0 %
GAMMA GLOB SERPL ELPH-MCNC: 9.2 % (ref 12–22)
IGG/ALB SER: 2.5 {RATIO} (ref 1.1–1.8)
PROT PATTERN SERPL ELPH-IMP: ABNORMAL
PROT PATTERN UR ELPH-IMP: NORMAL
PROT SERPL-MCNC: 7.5 G/DL (ref 6.4–8.2)
PROT UR-MCNC: 7 MG/DL

## 2023-04-09 LAB — METHYLMALONATE SERPL-SCNC: 344 NMOL/L (ref 0–378)

## 2023-04-16 NOTE — RESULT ENCOUNTER NOTE
Please let the patient know that the labs I ordered are stable  I do note the patient did see neurology and had imaging done and has follow-up already scheduled  I have ordered fasting blood work for 6 months to recheck his slightly elevated sugar and thyroid

## 2023-04-18 PROBLEM — R97.20 ELEVATED PSA: Status: ACTIVE | Noted: 2023-04-18

## 2023-04-25 ENCOUNTER — OFFICE VISIT (OUTPATIENT)
Dept: FAMILY MEDICINE CLINIC | Facility: CLINIC | Age: 73
End: 2023-04-25

## 2023-04-25 ENCOUNTER — TELEPHONE (OUTPATIENT)
Dept: NEUROLOGY | Facility: CLINIC | Age: 73
End: 2023-04-25

## 2023-04-25 ENCOUNTER — OFFICE VISIT (OUTPATIENT)
Dept: PHYSICAL THERAPY | Facility: REHABILITATION | Age: 73
End: 2023-04-25

## 2023-04-25 VITALS
BODY MASS INDEX: 27.47 KG/M2 | HEART RATE: 63 BPM | HEIGHT: 67 IN | DIASTOLIC BLOOD PRESSURE: 64 MMHG | OXYGEN SATURATION: 93 % | TEMPERATURE: 98.2 F | WEIGHT: 175 LBS | SYSTOLIC BLOOD PRESSURE: 118 MMHG

## 2023-04-25 DIAGNOSIS — F41.9 ANXIETY: ICD-10-CM

## 2023-04-25 DIAGNOSIS — E78.2 MIXED HYPERLIPIDEMIA: ICD-10-CM

## 2023-04-25 DIAGNOSIS — R26.89 BALANCE PROBLEM: Primary | ICD-10-CM

## 2023-04-25 DIAGNOSIS — H47.099 DISORDER OF OPTIC NERVE, UNSPECIFIED LATERALITY: ICD-10-CM

## 2023-04-25 DIAGNOSIS — R73.9 HYPERGLYCEMIA: ICD-10-CM

## 2023-04-25 DIAGNOSIS — E03.9 ACQUIRED HYPOTHYROIDISM: ICD-10-CM

## 2023-04-25 DIAGNOSIS — G57.93 NEUROPATHY INVOLVING BOTH LOWER EXTREMITIES: ICD-10-CM

## 2023-04-25 DIAGNOSIS — E23.7 PITUITARY LESION (HCC): ICD-10-CM

## 2023-04-25 DIAGNOSIS — N18.31 STAGE 3A CHRONIC KIDNEY DISEASE (HCC): ICD-10-CM

## 2023-04-25 DIAGNOSIS — I10 ESSENTIAL HYPERTENSION: Primary | ICD-10-CM

## 2023-04-25 DIAGNOSIS — G62.9 NEUROPATHY: ICD-10-CM

## 2023-04-25 NOTE — ASSESSMENT & PLAN NOTE
Patient continues to have higher than normal fasting sugars currently at 106 with an A1c of 5 8  Continue to observe  Prediabetic

## 2023-04-25 NOTE — TELEPHONE ENCOUNTER
Patient called to speak with someone to go over his recent labs and to also schedule a follow up appointment  I advised him I will send a message to the nurses team to get back to him to discuss his results  # [phone]      As for his appointment I advised him that I have Dr Nelson Morocho as of right now does not have any openings and I added him to her wait list and he asked if he can be scheduled with her supervising MD and informed him that I will reach out to the management of the residents to get him an answer and call him back

## 2023-04-25 NOTE — ASSESSMENT & PLAN NOTE
Lab Results   Component Value Date    EGFR 57 04/06/2023    EGFR 60 10/11/2022    EGFR 65 07/12/2021    CREATININE 1 25 04/06/2023    CREATININE 1 20 10/11/2022    CREATININE 1 13 07/12/2021   GFR has declined under 60 for the first time with a normal BUN/creatinine increase fluids avoid anti-inflammatories

## 2023-04-25 NOTE — PATIENT INSTRUCTIONS
1  Essential hypertension  Assessment & Plan:  Blood pressure well controlled continue current medication  2  Acquired hypothyroidism  Assessment & Plan:  TSH within normal limits      3  Hyperglycemia  Assessment & Plan:  Patient continues to have higher than normal fasting sugars currently at 106 with an A1c of 5 8  Continue to observe  Prediabetic  4  Stage 3a chronic kidney disease [facility])  Assessment & Plan:  Lab Results   Component Value Date    EGFR 57 04/06/2023    EGFR 60 10/11/2022    EGFR 65 07/12/2021    CREATININE 1 25 04/06/2023    CREATININE 1 20 10/11/2022    CREATININE 1 13 07/12/2021   GFR has declined under 60 for the first time with a normal BUN/creatinine increase fluids avoid anti-inflammatories  5  Mixed hyperlipidemia  Assessment & Plan:  LDL has improved from 123 down to 115  HDL triglycerides normal continue to observe yearly  6  Pituitary lesion [facility])  Assessment & Plan:  Patient is undergoing assessment by neurology  7  Anxiety  Assessment & Plan:  Continue psychiatry  8  Disorder of optic nerve, unspecified laterality  Assessment & Plan:  Pt  is seeing a eye specialist        9  Neuropathy involving both lower extremities  Assessment & Plan:  Check EMG robert LE  Orders:  -     EMG 2 limb lower extremity;  Future

## 2023-04-25 NOTE — PROGRESS NOTES
Name: Natasha Jolley      : 1950      MRN: [id number]  Encounter Provider: Matilde Morrow PA-C  Encounter Date: 2023   Encounter department: [facility] PRIMARY CARE    Assessment & Plan     1  Essential hypertension  Assessment & Plan:  Blood pressure well controlled continue current medication  2  Acquired hypothyroidism  Assessment & Plan:  TSH within normal limits      3  Hyperglycemia  Assessment & Plan:  Patient continues to have higher than normal fasting sugars currently at 106 with an A1c of 5 8  Continue to observe  Prediabetic  4  Stage 3a chronic kidney disease [facility])  Assessment & Plan:  Lab Results   Component Value Date    EGFR 57 2023    EGFR 60 10/11/2022    EGFR 65 2021    CREATININE 1 25 2023    CREATININE 1 20 10/11/2022    CREATININE 1 13 2021   GFR has declined under 60 for the first time with a normal BUN/creatinine increase fluids avoid anti-inflammatories  5  Mixed hyperlipidemia  Assessment & Plan:  LDL has improved from 123 down to 115  HDL triglycerides normal continue to observe yearly  6  Pituitary lesion [facility])  Assessment & Plan:  Patient is undergoing assessment by neurology  7  Anxiety  Assessment & Plan:  Continue psychiatry  8  Disorder of optic nerve, unspecified laterality  Assessment & Plan:  Pt  is seeing a eye specialist        9  Neuropathy involving both lower extremities  Assessment & Plan:  Check EMG robert LE  Orders:  -     EMG 2 limb lower extremity; Future  -     Lyme Antibody Profile with reflex to WB; Future  -     Sjogren's Antibodies; Future        Falls Plan of Care: balance, strength, and gait training instructions were provided  Depression Screening Follow-up Plan: Patient's depression screening was positive with a PHQ-2 score of   Their PHQ-9 score was 8   Continue regular follow-up with their psychologist/therapist/psychiatrist who is managing their mental health condition(s)  Subjective        Sonja Méndez is here for chronic conditions f/u  Pt is c/o not being able to feel the bottom of his feet well, no pain, balance issue  Feels like he has shoes on when he does not  This was evaluated at neuro but no specific testing was ordered      Pt  had labs done prior to today's visit which included Recent Results (from the past 672 hour(s))  -Protein electrophoresis, urine:   Collection Time: 04/06/23  9:42 AM       Result                      Value             Ref Range           Urine Protein               7 0               2 0 - 17 5 m*       Albumin ELP, Urine          100 0             %                   Alpha 1, Urine              0 0               %                   Alpha 2, Urine              0 0               %                   Beta, Urine                 0 0               %                   Gamma Globulin, Urine       0 0               %                   UPEP Interp                 See Comment                      -Comprehensive metabolic panel:   Collection Time: 04/06/23  9:42 AM       Result                      Value             Ref Range           Sodium                      137               135 - 147 mm*       Potassium                   4 8               3 5 - 5 3 mm*       Chloride                    107               96 - 108 mmo*       CO2                         29                21 - 32 mmol*       ANION GAP                   1 (L)             4 - 13 mmol/L       BUN                         18                5 - 25 mg/dL        Creatinine                  1 25              0 60 - 1 30 *       Glucose, Fasting            106 (H)           65 - 99 mg/dL       Calcium                     9 1               8 3 - 10 1 m*       AST                         32                5 - 45 U/L          ALT                         23                12 - 78 U/L         Alkaline Phosphatase        85                46 - 116 U/L        Total Protein               7 9 6 4 - 8 4 g/*       Albumin                     3 7               3 5 - 5 0 g/*       Total Bilirubin             0 37              0 20 - 1 00 *       eGFR                        57                ml/min/1 73s*  -Hemoglobin A1C:   Collection Time: 04/06/23  9:42 AM       Result                      Value             Ref Range           Hemoglobin A1C              5 8 (H)           Normal 3 8-5*       EAG                         120               mg/dl          -Lipid Panel with Direct LDL reflex:   Collection Time: 04/06/23  9:42 AM       Result                      Value             Ref Range           Cholesterol                 190               See Comment *       Triglycerides               93                See Comment *       HDL, Direct                 56                >=40 mg/dL          LDL Calculated              115 (H)           0 - 100 mg/dL  -CBC and differential:   Collection Time: 04/06/23  9:42 AM       Result                      Value             Ref Range           WBC                         6 39              4 31 - 10 16*       RBC                         4 87              3 88 - 5 62 *       Hemoglobin                  14 1              12 0 - 17 0 *       Hematocrit                  45 3              36 5 - 49 3 %       MCV                         93                82 - 98 fL          MCH                         29 0              26 8 - 34 3 *       MCHC                        31 1 (L)          31 4 - 37 4 *       RDW                         12 8              11 6 - 15 1 %       MPV                         9 4               8 9 - 12 7 fL       Platelets                   341               149 - 390 Th*       nRBC                        0                 /100 WBCs           Neutrophils Relative        55                43 - 75 %           Immat GRANS %               0                 0 - 2 %             Lymphocytes Relative        32                14 - 44 %           Monocytes Relative          9                 4 - 12 %            Eosinophils Relative        3                 0 - 6 %             Basophils Relative          1                 0 - 1 %             Neutrophils Absolute        3 43              1 85 - 7 62 *       Immature Grans Absolute     0 02              0 00 - 0 20 *       Lymphocytes Absolute        2 07              0 60 - 4 47 *       Monocytes Absolute          0 60              0 17 - 1 22 *       Eosinophils Absolute        0 21              0 00 - 0 61 *       Basophils Absolute          0 06              0 00 - 0 10 *  -TSH, 3rd generation with Free T4 reflex:   Collection Time: 04/06/23  9:42 AM       Result                      Value             Ref Range           TSH 3RD GENERATON           4 360             0 450 - 4 50*  -Vitamin B12:   Collection Time: 04/06/23  9:42 AM       Result                      Value             Ref Range           Vitamin B-12                225               100 - 900 pg*  -Vitamin B1, whole blood:   Collection Time: 04/06/23  9:42 AM       Result                      Value             Ref Range           Vitamin B1, Whole Blood     126 8             66 5 - 200 0*  -Vitamin B6:   Collection Time: 04/06/23  9:42 AM       Result                      Value             Ref Range           Vitamin B6                  6 0               3 4 - 65 2 u*  -Sedimentation rate, automated:   Collection Time: 04/06/23  9:42 AM       Result                      Value             Ref Range           Sed Rate                    29 (H)            0 - 19 mm/ho*  -C-reactive protein:   Collection Time: 04/06/23  9:42 AM       Result                      Value             Ref Range           CRP                         <3 0              <3 0 mg/L      -Protein electrophoresis, serum:   Collection Time: 04/06/23  9:42 AM       Result                      Value             Ref Range           A/G Ratio                   2 50 (H)          1 10 - 1 80 Albumin Electrophoresis     71 4 (H)          52 0 - 65 0 %       Albumin CONC                5 36 (H)          3 50 - 5 00 *       Alpha 1                     3 3               2 5 - 5 0 %         ALPHA 1 CONC                0 25              0 10 - 0 40 *       Alpha 2                     7 4               7 0 - 13 0 %        ALPHA 2 CONC                0 56              0 40 - 1 20 *       Beta-1                      3 9 (L)           5 0 - 13 0 %        BETA 1 CONC                 0 29 (L)          0 40 - 0 80 *       Beta-2                      4 8               2 0 - 8 0 %         BETA 2 CONC                 0 36              0 20 - 0 50 *       Gamma Globulin              9 2 (L)           12 0 - 22 0 %       GAMMA CONC                  0 69              0 50 - 1 60 *       Total Protein               7 5               6 4 - 8 2 g/*       SPEP Interpretation         See Comment                      -Methylmalonic acid, serum:   Collection Time: 04/06/23  9:42 AM       Result                      Value             Ref Range           Methylmalonic Acid, S       344               0 - 378 nmol*  -Folate:   Collection Time: 04/06/23  9:42 AM       Result                      Value             Ref Range           Folate                      7 8               3 1 - 17 5 n*      Review of Systems   Constitutional: Negative  HENT: Negative  Eyes: Negative  Respiratory: Negative  Cardiovascular: Negative  Gastrointestinal: Negative  Endocrine: Negative  Genitourinary: Negative  Musculoskeletal: Negative  Skin: Negative  Allergic/Immunologic: Negative  Neurological: Negative  Hematological: Negative  Psychiatric/Behavioral: Negative          Current Outpatient Medications on File Prior to Visit   Medication Sig   • alfuzosin (UROXATRAL) 10 mg 24 hr tablet Take 1 tablet (10 mg total) by mouth daily   • ALPRAZolam (XANAX) 0 25 mg tablet daily as needed   • buPROPion "(WELLBUTRIN SR) 150 mg 12 hr tablet Take 150 mg by mouth 2 (two) times a day   • escitalopram (LEXAPRO) 5 mg tablet Take 1 tablet by mouth daily   • fluticasone (FLONASE) 50 mcg/act nasal spray 1 spray into each nostril daily (Patient taking differently: 1 spray into each nostril if needed)   • lamoTRIgine (LaMICtal) 200 MG tablet Take 1 tablet by mouth 2 (two) times a day   • levothyroxine 125 mcg tablet Take 1 tablet (125 mcg total) by mouth daily   • losartan (COZAAR) 50 mg tablet Take 1 tablet (50 mg total) by mouth daily   • traZODone (DESYREL) 50 mg tablet Take 50 mg by mouth         Objective     /64 (BP Location: Right arm, Patient Position: Sitting, Cuff Size: Standard)   Pulse 63   Temp 98 2 °F (36 8 °C) (Temporal)   Ht 5' 7\" (1 702 m)   Wt 79 4 kg (175 lb)   SpO2 93%   BMI 27 41 kg/m²     Physical Exam  Vitals and nursing note reviewed  Constitutional:       General: He is not in acute distress  Appearance: He is well-developed  He is not diaphoretic  HENT:      Head: Normocephalic and atraumatic  Eyes:      General:         Right eye: No discharge  Left eye: No discharge  Conjunctiva/sclera: Conjunctivae normal    Neck:      Vascular: No carotid bruit  Cardiovascular:      Rate and Rhythm: Normal rate and regular rhythm  Heart sounds: Normal heart sounds  No murmur heard  No friction rub  No gallop  Pulmonary:      Effort: Pulmonary effort is normal  No respiratory distress  Breath sounds: Normal breath sounds  No wheezing or rales  Skin:     General: Skin is warm and dry  Neurological:      Mental Status: He is alert and oriented to person, place, and time     Psychiatric:         Judgment: Judgment normal        Era Drafts, PA-C  "

## 2023-04-25 NOTE — PROGRESS NOTES
"Daily Note     Today's date: 2023  Patient name: Rabon Bumpers  : 1950  MRN: [id number]  Referring provider: Wolf Cowan DO  Dx:   Encounter Diagnosis     ICD-10-CM    1  Balance problem  R26 89       2  Neuropathy  G62 9                      Subjective: No changes or new complaints  Objective: See treatment diary below  [id number] self-directed  [id number] group  [id number] 1:1  [id number] 1:1    Assessment:  Improved tolerance for session today as seen by less LOBs and posterior leaning  Continues with most difficulty with foam surfaces  Improved step length noted with bwd walking today  Patient would benefit from continued PT      Plan: Slip  next visit  Precautions: fall risk      Manuals                                                               Neuro Re-Ed             Tidal wave On foam incline fwd x5, side x3 ea  On foam incline fwd x5, side x3 ea          Hurdles, fwd/side High 3 laps ea High 3 laps ea High 3 laps ea          Walking with HTs, H/V On TM below; bwd 4x20' ea On TM below; bwd 4x20' ea On TM below; bwd 4x20' ea          Step-ups with holds onto foam 8\" fwd x20 ea 8\" fwd x20 ea           Foam EC FT 3x30s FT 3x30s FT 3x30s          Foam beams, fwd/side 4 laps ea 4 laps ea 3 laps ea          Slip  x5min  NV          Ther Ex                                                                                                                     Ther Activity                                       Gait Training             TM (no solo) fwd 2 5 mph UE prn x6min total HTs H/V 2x30s ea SOLO fwd x6min 2 5 mph 2x30s ea HTs H/V; bwd   8mph x5min SOLO fwd x7min 2 5 mph 2x30s ea HTs H/V; bwd   8mph x5min                       Modalities                                            "

## 2023-04-26 ENCOUNTER — TELEPHONE (OUTPATIENT)
Dept: NEUROLOGY | Facility: CLINIC | Age: 73
End: 2023-04-26

## 2023-04-26 NOTE — TELEPHONE ENCOUNTER
I called and spoke with Mr  Uche Conti; advised him that his office note should be completed by tomorrow  The patient verbalized understanding with no further questions

## 2023-04-26 NOTE — TELEPHONE ENCOUNTER
Per encounter 3/22:    Yuridia Rodriguez MD  to Jerome Case • Raffy Summers DO • Arabella Alas        8:08 AM  Note   Greatly appreciated - patient will be urgently evaluated by Dr Chen Wilkes with Dr Charmaine Schultz ( headache team)

## 2023-04-26 NOTE — TELEPHONE ENCOUNTER
Called patient and advised him of what Arabella advised of the scheduling and informed him to call back if he has any questions

## 2023-04-27 NOTE — ASSESSMENT & PLAN NOTE
Slowly progressive postural and action of the hands without any parkinsonian symptoms, consistent with a diagnosis of essential tremor  Disrupts his ability to do crossword puzzles but otherwise does not bother him  We discussed that there are medication treatment options available  Will continue to monitor clinically for now

## 2023-04-27 NOTE — ASSESSMENT & PLAN NOTE
Noted on recent MRI to have 8 mm fluid-appearing lesion along the infundibulum  From radiology report, may be artifact but repeat imaging recommended  Additionally there appears to be a partially empty sella  He denies positional headaches or changes to visual fields  Additionally no papilledema on my exam  Recommend repeating MRI pituitary in 3 months to reassess pituitary gland

## 2023-04-27 NOTE — ASSESSMENT & PLAN NOTE
Length-dependent acral decrease in sensation to the lower extremities, worse on the right, suggesting an underlying peripheral neuropathy and may also have a superimposed right-sided radiculopathy  Have ordered additional labs to evaluate for reversible causes of neuropathy  Reports difficulty with balance and will refer to PT for balance therapy

## 2023-04-27 NOTE — ASSESSMENT & PLAN NOTE
Two separate episodes of diplopia with resolution of symptoms  On exam, noted that the right eye lags behind left eye movement in all directions though no true gaze palsy or incomplete eye movements  No diplopia from the eye movement differences  Unclear etiology, do not suspect stroke  Has not been to an eye doctor in many years  Recommend evaluation with Neuro-Ophthalmology and have asked patient to have his records forwarded to us for review

## 2023-04-28 ENCOUNTER — OFFICE VISIT (OUTPATIENT)
Dept: PHYSICAL THERAPY | Facility: REHABILITATION | Age: 73
End: 2023-04-28

## 2023-04-28 DIAGNOSIS — G62.9 NEUROPATHY: ICD-10-CM

## 2023-04-28 DIAGNOSIS — R26.89 BALANCE PROBLEM: Primary | ICD-10-CM
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
## 2023-05-02 ENCOUNTER — OFFICE VISIT (OUTPATIENT)
Dept: PHYSICAL THERAPY | Facility: REHABILITATION | Age: 73
End: 2023-05-02

## 2023-05-02 DIAGNOSIS — R26.89 BALANCE PROBLEM: Primary | ICD-10-CM

## 2023-05-02 DIAGNOSIS — G62.9 NEUROPATHY: ICD-10-CM

## 2023-05-02 NOTE — PROGRESS NOTES
"Daily Note     Today's date: 2023  Patient name: Joe Barton  : 1950  MRN: [id number]  Referring provider: Gaurav Razo DO  Dx:   Encounter Diagnosis     ICD-10-CM    1  Balance problem  R26 89       2  Neuropathy  G62 9           Start Time: 1200  Stop Time: 1300  Total time in clinic (min): 60 minutes    Subjective: Is feeling pretty good! Has been trying to practice backwards walking  Objective: See treatment diary below      Assessment: Tolerated treatment well  Has increased challenge and overall difficulty with backwards ambulation that is most noticeable when he is on the treadmill with backwards amb, although seemingly is showing improvements in this activity and overall somatosensory integration  High degree of difficulty with increased single leg stance time that was more notable with patient performing high knee marching  Has tendency to be impuslvie with movements  Some frustration with his balance reactions and difficulties with prolonged single leg stance times  Patient demonstrated fatigue post treatment, exhibited good technique with therapeutic exercises and would benefit from continued PT      Plan: Continue per plan of care  Progress treatment as tolerated         Precautions: fall risk      Manuals                                                              Neuro Re-Ed             Tidal wave On foam incline fwd x5, side x3 ea  On foam incline fwd x5, side x3 ea On foam incline fwd x5, side x3 ea Fwd x3 laps HKM with pause     Side x3   bwd x3         Hurdles, fwd/side High 3 laps ea High 3 laps ea High 3 laps ea Over beams High 3 laps fwd + lat         Walking with HTs, H/V On TM below; bwd 4x20' ea On TM below; bwd 4x20' ea On TM below; bwd 4x20' ea On TM below; bwd 4x20' ea On TM below, 4x20\" ea        Step-ups with holds onto foam 8\" fwd x20 ea 8\" fwd x20 ea           Foam EC FT 3x30s FT 3x30s FT 3x30s FT x30s  HTs H/V 2x30s ea FT x30s HT " "  3x30s ea     Std march foam EC 3x30\"         Foam beams, fwd/side 4 laps ea 4 laps ea 3 laps ea With hurdles above 3 laps ea         Slip  x5min  NV x5min         Ther Ex                                                                                                                     Ther Activity                                       Gait Training             TM (no solo) fwd 2 5 mph UE prn x6min total HTs H/V 2x30s ea SOLO fwd x6min 2 5 mph 2x30s ea HTs H/V; bwd   8mph x5min SOLO fwd x7min 2 5 mph 2x30s ea HTs H/V; bwd   8mph x5min SOLO fwd x4min 3 1 mph 2x30s ea HTs H/V; bwd  1  5mph x5min SOLO fwd x5 min   2x30s ea HT H/V     bwd 1 2 mph x5 min                     Modalities                                            "

## 2023-05-03 ENCOUNTER — OFFICE VISIT (OUTPATIENT)
Dept: PHYSICAL THERAPY | Facility: REHABILITATION | Age: 73
End: 2023-05-03

## 2023-05-03 DIAGNOSIS — R26.89 BALANCE PROBLEM: Primary | ICD-10-CM

## 2023-05-03 DIAGNOSIS — G62.9 NEUROPATHY: ICD-10-CM

## 2023-05-03 NOTE — PROGRESS NOTES
"Daily Note     Today's date: 5/3/2023  Patient name: Mike Mejia  : 1950  MRN: [id number]  Referring provider: Melissa Dominguez DO  Dx:   Encounter Diagnosis     ICD-10-CM    1  Balance problem  R26 89       2  Neuropathy  G62 9                      Subjective: No new complaints  Denies significant sciatica pain  Objective: See treatment diary below  30-9 1:1  5118-1184 1:1  6466-0485 group  6980-7424 self-directed    Assessment: Improved control with step ups today as seen by less LOBs and stepping strategies to correct sway  Improved stability noted on TM as well with bwd walking showing progress  Continues with decreased foot clearance with hurdles  Discussed progress update next week  Plan:  Attempt tidal wave with foam beams NV         Precautions: fall risk      Manuals 4/18 4/21 4/25 4/28 5/2  5/3                                                           Neuro Re-Ed             Tidal wave On foam incline fwd x5, side x3 ea  On foam incline fwd x5, side x3 ea On foam incline fwd x5, side x3 ea Fwd x3 laps HKM with pause     Side x3   bwd x3         Hurdles, fwd/side High 3 laps ea High 3 laps ea High 3 laps ea Over beams High 3 laps fwd + lat  High 3 laps ea       Walking with HTs, H/V On TM below; bwd 4x20' ea On TM below; bwd 4x20' ea On TM below; bwd 4x20' ea On TM below; bwd 4x20' ea On TM below, 4x20\" ea On TM below; bwd 4x20' ea       Step-ups with holds onto foam 8\" fwd x20 ea 8\" fwd x20 ea    8\" fwd x20 ea       Foam EC FT 3x30s FT 3x30s FT 3x30s FT x30s  HTs H/V 2x30s ea FT x30s HT   3x30s ea      foam EC 3x30\"  FT x30s  HTs H/V 2x30s ea       Foam beams, fwd/side 4 laps ea 4 laps ea 3 laps ea With hurdles above 3 laps ea  3 laps ea       Slip  x5min  NV x5min  x5min       Ther Ex                                                                                                                     Ther Activity                                       Gait Training  " TM (no solo) fwd 2 5 mph UE prn x6min total HTs H/V 2x30s ea SOLO fwd x6min 2 5 mph 2x30s ea HTs H/V; bwd   8mph x5min SOLO fwd x7min 2 5 mph 2x30s ea HTs H/V; bwd   8mph x5min SOLO fwd x4min 3 1 mph 2x30s ea HTs H/V; bwd  1  5mph x5min SOLO fwd x5 min   2x30s ea HT H/V     bwd 1 2 mph x5 min SOLO fwd 3  3mph x5 min   2x30s ea HT H/V; bwd 1mph x5min                     Modalities

## 2023-05-05 ENCOUNTER — APPOINTMENT (OUTPATIENT)
Dept: PHYSICAL THERAPY | Facility: REHABILITATION | Age: 73
End: 2023-05-05
Payer: MEDICARE

## 2023-05-08 DIAGNOSIS — I10 ESSENTIAL HYPERTENSION: ICD-10-CM

## 2023-05-08 RX ORDER — LOSARTAN POTASSIUM 50 MG/1
TABLET ORAL
Qty: 90 TABLET | Refills: 1 | Status: SHIPPED | OUTPATIENT
Start: 2023-05-08

## 2023-05-09 ENCOUNTER — OFFICE VISIT (OUTPATIENT)
Dept: PHYSICAL THERAPY | Facility: REHABILITATION | Age: 73
End: 2023-05-09

## 2023-05-09 DIAGNOSIS — G62.9 NEUROPATHY: ICD-10-CM

## 2023-05-09 DIAGNOSIS — R26.89 BALANCE PROBLEM: Primary | ICD-10-CM

## 2023-05-09 NOTE — PROGRESS NOTES
"Daily Note     Today's date: 2023  Patient name: Jono Forrester  : 1950  MRN: [id number]  Referring provider: Edison Rubio DO  Dx:   Encounter Diagnosis     ICD-10-CM    1  Balance problem  R26 89       2  Neuropathy  G62 9                      Subjective: No changes or new complaints  Objective: See treatment diary below  5544-8026 1:1  8483-2882 1:1  3225-3817 group  [id number] self-directed    Assessment: Able to add tidal wave with foam beams today progressing challenge on balance deficits  Pt required increased time to complete and had frequent LOBs, but able to do the activity  Also progressed to step-overs, after multiple reps able to demonstrate improved control  Continues to require SOLO for fall prevention due to balance and coordination deficits  Plan:  Progress update NV       Precautions: fall risk      Manuals 4/18 4/21 4/25 4/28 5/2  5/3 5/9                                                          Neuro Re-Ed             Tidal wave On foam incline fwd x5, side x3 ea  On foam incline fwd x5, side x3 ea On foam incline fwd x5, side x3 ea Fwd x3 laps HKM with pause     Side x3   bwd x3   On foam beam below      Hurdles, fwd/side High 3 laps ea High 3 laps ea High 3 laps ea Over beams High 3 laps fwd + lat  High 3 laps ea High 3 laps ea      Walking with HTs, H/V On TM below; bwd 4x20' ea On TM below; bwd 4x20' ea On TM below; bwd 4x20' ea On TM below; bwd 4x20' ea On TM below, 4x20\" ea On TM below; bwd 4x20' ea On TM below; bwd 4x20' ea      Step-ups with holds onto foam 8\" fwd x20 ea 8\" fwd x20 ea    8\" fwd x20 ea 8\" & foam fwd step-overs x10 ea; side x10 ea      Foam EC FT 3x30s FT 3x30s FT 3x30s FT x30s  HTs H/V 2x30s ea FT x30s HT   3x30s ea      foam EC 3x30\"  FT x30s  HTs H/V 2x30s ea       Foam beams, fwd/side 4 laps ea 4 laps ea 3 laps ea With hurdles above 3 laps ea  3 laps ea 3 laps ea with tidal wave      Slip  x5min  NV x5min  x5min       Ther Ex    " Ther Activity                                       Gait Training             TM (no solo) fwd 2 5 mph UE prn x6min total HTs H/V 2x30s ea SOLO fwd x6min 2 5 mph 2x30s ea HTs H/V; bwd   8mph x5min SOLO fwd x7min 2 5 mph 2x30s ea HTs H/V; bwd   8mph x5min SOLO fwd x4min 3 1 mph 2x30s ea HTs H/V; bwd  1  5mph x5min SOLO fwd x5 min   2x30s ea HT H/V     bwd 1 2 mph x5 min SOLO fwd 3  3mph x5 min   2x30s ea HT H/V; bwd 1mph x5min  SOLO fwd 3  3mph x5 min   2x30s ea HT H/V; bwd 1mph x5min                   Modalities

## 2023-05-10 ENCOUNTER — LAB (OUTPATIENT)
Dept: LAB | Facility: MEDICAL CENTER | Age: 73
End: 2023-05-10

## 2023-05-10 DIAGNOSIS — G57.93 NEUROPATHY INVOLVING BOTH LOWER EXTREMITIES: ICD-10-CM

## 2023-05-10 DIAGNOSIS — R97.20 ELEVATED PSA: ICD-10-CM

## 2023-05-10 LAB — B BURGDOR IGG+IGM SER-ACNC: <0.2 AI

## 2023-05-12 ENCOUNTER — EVALUATION (OUTPATIENT)
Dept: PHYSICAL THERAPY | Facility: REHABILITATION | Age: 73
End: 2023-05-12

## 2023-05-12 DIAGNOSIS — G62.9 NEUROPATHY: ICD-10-CM

## 2023-05-12 DIAGNOSIS — R26.89 BALANCE PROBLEM: Primary | ICD-10-CM

## 2023-05-12 LAB
ENA SS-A AB SER-ACNC: <0.2 AI (ref 0–0.9)
ENA SS-B AB SER-ACNC: <0.2 AI (ref 0–0.9)
PSA FREE MFR SERPL: 21 %
PSA FREE SERPL-MCNC: 0.86 NG/ML
PSA SERPL-MCNC: 4.1 NG/ML (ref 0–4)

## 2023-05-12 NOTE — PROGRESS NOTES
Progress Update Note     Today's date: 2023  Patient name: Alla Figueredo  : 1950  MRN: [id number]  Referring provider: Jose Enrique Cavazos DO  Dx:   Encounter Diagnosis     ICD-10-CM    1  Balance problem  R26 89       2  Neuropathy  G62 9                      Subjective: Feels like he's more balanced at home  Prior to therapy wasn't able to use a shovel outside with hie gardening, would lose his balance  Was able to do it yesterday  Knows he needs to be walking more at home  Hasn't tried hiking due to his balance/neuropathy  Denies any new changes or complaints since last visit  Objective: See treatment diary below  Functional outcomes   6 minute walk test: 1800' without AD on eval; 1800' without AD today (many obstacles in his way)  Gait speed: 1 4 m/s without AD on eval; 1 6 m/s without AD today  5x sit to stand: 12 sec (seconds) on eval; NT today  FGA:  on eval;  today    Assessment: Progress update completed with pt making significant gains in his FGA score  No longer in fall risk category  Other outcomes maintained, however pt was WNL on eval with these  Discussed incorporating more vestibular exercises like eyes closer and HTs at home while walking  Also discussed trying walking sticks to resume hiking  Pt educated on importance of having a regular exercise program at home such as walking  Pt does not require PT services at this time  Will follow-up with PT as needed  Declined treatment session after assessment today  Plan:  Discharge       Precautions: fall risk      Manuals  5  5/3 5/9                                                          Neuro Re-Ed             Tidal wave On foam incline fwd x5, side x3 ea  On foam incline fwd x5, side x3 ea On foam incline fwd x5, side x3 ea Fwd x3 laps HKM with pause     Side x3   bwd x3   On foam beam below      Hurdles, fwd/side High 3 laps ea High 3 laps ea High 3 laps ea Over beams High 3 laps fwd + "lat  High 3 laps ea High 3 laps ea      Walking with HTs, H/V On TM below; bwd 4x20' ea On TM below; bwd 4x20' ea On TM below; bwd 4x20' ea On TM below; bwd 4x20' ea On TM below, 4x20\" ea On TM below; bwd 4x20' ea On TM below; bwd 4x20' ea      Step-ups with holds onto foam 8\" fwd x20 ea 8\" fwd x20 ea    8\" fwd x20 ea 8\" & foam fwd step-overs x10 ea; side x10 ea      Foam EC FT 3x30s FT 3x30s FT 3x30s FT x30s  HTs H/V 2x30s ea FT x30s HT   3x30s ea     Std march foam EC 3x30\"  FT x30s  HTs H/V 2x30s ea       Foam beams, fwd/side 4 laps ea 4 laps ea 3 laps ea With hurdles above 3 laps ea  3 laps ea 3 laps ea with tidal wave      Slip  x5min  NV x5min  x5min       Ther Ex                                                                                                                     Ther Activity                                       Gait Training             TM (no solo) fwd 2 5 mph UE prn x6min total HTs H/V 2x30s ea SOLO fwd x6min 2 5 mph 2x30s ea HTs H/V; bwd   8mph x5min SOLO fwd x7min 2 5 mph 2x30s ea HTs H/V; bwd   8mph x5min SOLO fwd x4min 3 1 mph 2x30s ea HTs H/V; bwd  1  5mph x5min SOLO fwd x5 min   2x30s ea HT H/V     bwd 1 2 mph x5 min SOLO fwd 3  3mph x5 min   2x30s ea HT H/V; bwd 1mph x5min  SOLO fwd 3  3mph x5 min   2x30s ea HT H/V; bwd 1mph x5min                   Modalities                                            "

## 2023-05-16 ENCOUNTER — NEW PATIENT (OUTPATIENT)
Dept: URBAN - METROPOLITAN AREA CLINIC 6 | Facility: CLINIC | Age: 73
End: 2023-05-16

## 2023-05-16 DIAGNOSIS — R97.20 ELEVATED PSA: Primary | ICD-10-CM

## 2023-05-16 DIAGNOSIS — Z96.1: ICD-10-CM

## 2023-05-16 DIAGNOSIS — H53.2: ICD-10-CM

## 2023-05-16 DIAGNOSIS — H25.812: ICD-10-CM

## 2023-05-16 PROCEDURE — 99204 OFFICE O/P NEW MOD 45 MIN: CPT

## 2023-05-16 PROCEDURE — 92083 EXTENDED VISUAL FIELD XM: CPT

## 2023-05-16 ASSESSMENT — TONOMETRY
OD_IOP_MMHG: 16
OS_IOP_MMHG: 10

## 2023-05-16 ASSESSMENT — VISUAL ACUITY
OS_SC: 20/40-1
OD_SC: 20/30-1

## 2023-05-16 NOTE — RESULT ENCOUNTER NOTE
Inform pt of abnormal test result  The PSA is improved but still a little high  Free PSA is 21% associated with a 20% risk of prostate cancer  We could recheck in 3 months or proceed with MRI  Either option is ok   If he wishes to proceed with MRI let me know and I will order the test

## 2023-05-17 ENCOUNTER — TELEPHONE (OUTPATIENT)
Dept: UROLOGY | Facility: MEDICAL CENTER | Age: 73
End: 2023-05-17

## 2023-05-17 NOTE — TELEPHONE ENCOUNTER
Called patient and transferred directly to [facility] to arrange at his convenience

## 2023-05-17 NOTE — TELEPHONE ENCOUNTER
----- Message from Charbel Samuel MD sent at 5/16/2023  3:29 PM EDT -----  I placed the order for MRI prostate- please schedule   ----- Message -----  From: Sixto Pineda MA  Sent: 5/16/2023   2:18 PM EDT  To: Charbel Samuel MD    Call placed to Pt and he was given his results and recommendations  Pt wishes to go ahead with the MRI

## 2023-05-17 NOTE — TELEPHONE ENCOUNTER
Called patient and spoke with wife - PSA results are given and they wish to proceed with the MRI of the prostate  Clerical - Please call patient after 11:00 today to schedule MRI of the prostate  I offered to give them central scheduling number but they wished for us to arrange

## 2023-06-01 ENCOUNTER — HOSPITAL ENCOUNTER (OUTPATIENT)
Facility: MEDICAL CENTER | Age: 73
Discharge: HOME | End: 2023-06-01
Attending: UROLOGY

## 2023-06-01 DIAGNOSIS — R97.20 ELEVATED PSA: ICD-10-CM

## 2023-06-01 RX ADMIN — GADOBUTROL 9 ML: 604.72 INJECTION INTRAVENOUS at 13:17

## 2023-06-01 NOTE — LETTER
[address] [place] [address]  [address] [facility] [address] [address]      June 8, 2023    MRN: [id number]     Phone: [phone]     Dear Mr Ra Russell recently had a(n) MRI performed on 6/1/2023 at  [address] [place] [address] that was requested by Cadence Thayer MD  The study was reviewed by a radiologist, which is a physician who specializes in medical imaging  The radiologist issued a report describing his or her findings  In that report there was a finding that the radiologist felt warranted further discussion with your health care provider and that discussion would be beneficial to you  The results were sent to Cadence Thayer MD on 06/05/2023 12:47 PM  We recommend that you contact Cadence Thayer MD at [phone] or set up an appointment to discuss the results of the imaging test  If you have already heard from Cadence Thayer MD regarding the results of your study, you can disregard this letter  This letter is not meant to alarm you, but intended to encourage you to follow-up on your results with the provider that sent you for the imaging study  In addition, we have enclosed answers to frequently asked questions by other patients who have also received a letter to review results with their health care provider (see page two)  Thank you for choosing [facility] for your medical imaging needs  FREQUENTLY ASKED QUESTIONS    Why am I receiving this letter? [address] [place] [address] requires us to notify patients who have findings on imaging exams that may require more testing or follow-up with a health professional within the next 3 months  How serious is the finding on the imaging test?  This letter is sent to all patients who may need follow-up or more testing within the next 3 months  Receiving this letter does not necessarily mean you have a life-threatening imaging finding or disease  Recommendations in the radiologist’s imaging report are general in nature and it is up to your healthcare provider to say whether those recommendations make sense for your situation  You are strongly encouraged to talk to your health care provider about the results and ask whether additional steps need to be taken  Where can I get a copy of the final report for my recent radiology exam?  To get a full copy of the report you can access your records online at [email or web address] or please contact [facility] Medical Records Department at [phone] Monday through Friday between 8 am and 6 pm          What do I need to do now? Please contact your health care provider who requested the imaging study to discuss what further actions (if any) are needed  You may have already heard from (your ordering provider) in regard to this test in which case you can disregard this letter  NOTICE IN ACCORDANCE WITH THE [place] STATE “PATIENT TEST RESULT INFORMATION ACT OF 2018”    You are receiving this notice as a result of a determination by your diagnostic imaging service that further discussions of your test results are warranted and would be beneficial to you  The complete results of your test or tests have been or will be sent to the health care practitioner that ordered the test or tests  It is recommended that you contact your health care practitioner to discuss your results as soon as possible

## 2023-06-05 ENCOUNTER — TELEPHONE (OUTPATIENT)
Dept: UROLOGY | Facility: MEDICAL CENTER | Age: 73
End: 2023-06-05

## 2023-06-06 ENCOUNTER — PREP FOR PROCEDURE (OUTPATIENT)
Dept: UROLOGY | Facility: MEDICAL CENTER | Age: 73
End: 2023-06-06

## 2023-06-06 DIAGNOSIS — R93.5 ABNORMAL MRI, PELVIS: ICD-10-CM

## 2023-06-06 DIAGNOSIS — R97.20 ELEVATED PSA: Primary | ICD-10-CM

## 2023-06-06 NOTE — RESULT ENCOUNTER NOTE
Spoke to the patient about the MRI results  Please schedule him for a transperineal fusion biopsy  Orders will be placed in epic    Thanks

## 2023-06-09 ENCOUNTER — TELEPHONE (OUTPATIENT)
Dept: UROLOGY | Facility: MEDICAL CENTER | Age: 73
End: 2023-06-09

## 2023-06-09 ENCOUNTER — TELEPHONE (OUTPATIENT)
Dept: NEUROLOGY | Facility: CLINIC | Age: 73
End: 2023-06-09

## 2023-06-09 DIAGNOSIS — G93.2 IDIOPATHIC INTRACRANIAL HYPERTENSION: Primary | ICD-10-CM

## 2023-06-09 DIAGNOSIS — E03.9 ACQUIRED HYPOTHYROIDISM: ICD-10-CM

## 2023-06-09 RX ORDER — LEVOTHYROXINE SODIUM 0.12 MG/1
TABLET ORAL
Qty: 90 TABLET | Refills: 3 | Status: SHIPPED | OUTPATIENT
Start: 2023-06-09

## 2023-06-09 NOTE — TELEPHONE ENCOUNTER
I spoke to the patient and scheduled his procedure for 8/1/2023 at [facility] with Dr Herbert Becker     -instructions given verbally and mailed  -patient aware to be NPO, needs a  and use an enema 1 hour prior to leaving the house morning of procedure  -patient aware to avoid any potentially blood thinning medications 7 days prior  -CBC, CMP, Urine C&S and EKG 2 weeks prior  -PO/JG - 8/11/2023

## 2023-06-09 NOTE — TELEPHONE ENCOUNTER
Patient left voicemail requesting order for labs pre MRI  Call returned to patient, [phone]  Advised labs are ordered  Patient requesting follow up visit  Scheduled as requested

## 2023-06-13 ENCOUNTER — APPOINTMENT (OUTPATIENT)
Dept: LAB | Facility: MEDICAL CENTER | Age: 73
End: 2023-06-13
Attending: UROLOGY
Payer: MEDICARE

## 2023-06-13 ENCOUNTER — TELEPHONE (OUTPATIENT)
Dept: NEUROLOGY | Facility: CLINIC | Age: 73
End: 2023-06-13

## 2023-06-13 DIAGNOSIS — Z01.812 PRE-OPERATIVE LABORATORY EXAMINATION: ICD-10-CM

## 2023-06-13 DIAGNOSIS — R93.5 ABNORMAL MRI, PELVIS: ICD-10-CM

## 2023-06-13 DIAGNOSIS — R39.89 SUSPECTED UTI: ICD-10-CM

## 2023-06-13 DIAGNOSIS — G93.2 IDIOPATHIC INTRACRANIAL HYPERTENSION: ICD-10-CM

## 2023-06-13 DIAGNOSIS — R97.20 ELEVATED PSA: ICD-10-CM

## 2023-06-13 LAB
ALBUMIN SERPL BCP-MCNC: 3.8 G/DL (ref 3.5–5)
ALP SERPL-CCNC: 72 U/L (ref 46–116)
ALT SERPL W P-5'-P-CCNC: 21 U/L (ref 12–78)
ANION GAP SERPL CALCULATED.3IONS-SCNC: 1 MMOL/L (ref 4–13)
AST SERPL W P-5'-P-CCNC: 23 U/L (ref 5–45)
BASOPHILS # BLD AUTO: 0.06 THOUSANDS/ÂΜL (ref 0–0.1)
BASOPHILS NFR BLD AUTO: 1 % (ref 0–1)
BILIRUB SERPL-MCNC: 0.47 MG/DL (ref 0.2–1)
BUN SERPL-MCNC: 17 MG/DL (ref 5–25)
CALCIUM SERPL-MCNC: 9.1 MG/DL (ref 8.3–10.1)
CHLORIDE SERPL-SCNC: 108 MMOL/L (ref 96–108)
CO2 SERPL-SCNC: 28 MMOL/L (ref 21–32)
CREAT SERPL-MCNC: 1.15 MG/DL (ref 0.6–1.3)
EOSINOPHIL # BLD AUTO: 0.25 THOUSAND/ÂΜL (ref 0–0.61)
EOSINOPHIL NFR BLD AUTO: 3 % (ref 0–6)
ERYTHROCYTE [DISTWIDTH] IN BLOOD BY AUTOMATED COUNT: 13.2 % (ref 11.6–15.1)
GFR SERPL CREATININE-BSD FRML MDRD: 63 ML/MIN/1.73SQ M
GLUCOSE SERPL-MCNC: 89 MG/DL (ref 65–140)
HCT VFR BLD AUTO: 42.8 % (ref 36.5–49.3)
HGB BLD-MCNC: 14.1 G/DL (ref 12–17)
IMM GRANULOCYTES # BLD AUTO: 0.04 THOUSAND/UL (ref 0–0.2)
IMM GRANULOCYTES NFR BLD AUTO: 1 % (ref 0–2)
LYMPHOCYTES # BLD AUTO: 2.31 THOUSANDS/ÂΜL (ref 0.6–4.47)
LYMPHOCYTES NFR BLD AUTO: 30 % (ref 14–44)
MCH RBC QN AUTO: 30 PG (ref 26.8–34.3)
MCHC RBC AUTO-ENTMCNC: 32.9 G/DL (ref 31.4–37.4)
MCV RBC AUTO: 91 FL (ref 82–98)
MONOCYTES # BLD AUTO: 0.82 THOUSAND/ÂΜL (ref 0.17–1.22)
MONOCYTES NFR BLD AUTO: 11 % (ref 4–12)
NEUTROPHILS # BLD AUTO: 4.25 THOUSANDS/ÂΜL (ref 1.85–7.62)
NEUTS SEG NFR BLD AUTO: 54 % (ref 43–75)
NRBC BLD AUTO-RTO: 0 /100 WBCS
PLATELET # BLD AUTO: 285 THOUSANDS/UL (ref 149–390)
PMV BLD AUTO: 10.4 FL (ref 8.9–12.7)
POTASSIUM SERPL-SCNC: 4.3 MMOL/L (ref 3.5–5.3)
PROT SERPL-MCNC: 7.7 G/DL (ref 6.4–8.4)
RBC # BLD AUTO: 4.7 MILLION/UL (ref 3.88–5.62)
SODIUM SERPL-SCNC: 137 MMOL/L (ref 135–147)
WBC # BLD AUTO: 7.73 THOUSAND/UL (ref 4.31–10.16)

## 2023-06-13 PROCEDURE — 36415 COLL VENOUS BLD VENIPUNCTURE: CPT

## 2023-06-13 PROCEDURE — 80053 COMPREHEN METABOLIC PANEL: CPT

## 2023-06-13 PROCEDURE — 87086 URINE CULTURE/COLONY COUNT: CPT

## 2023-06-13 PROCEDURE — 85025 COMPLETE CBC W/AUTO DIFF WBC: CPT

## 2023-06-13 NOTE — TELEPHONE ENCOUNTER
Called patient back no answer, LMOM to call us back if he needs any further assistance as I see he is scheduled with Dr Lim on 09/26/2023, Yuridia Feliciano at 4:30 pm     Thank you,     Perla Hirsch

## 2023-06-13 NOTE — TELEPHONE ENCOUNTER
Called patient back no answer, LMOM to call us back if he needs any further assistance as I see he is scheduled with Dr Lim on 09/26/2023, Lety Bryson at 4:30 pm     Thank you,     Brittany Amaya

## 2023-06-14 ENCOUNTER — TELEPHONE (OUTPATIENT)
Dept: NEUROLOGY | Facility: CLINIC | Age: 73
End: 2023-06-14

## 2023-06-14 LAB — BACTERIA UR CULT: NORMAL

## 2023-06-14 NOTE — TELEPHONE ENCOUNTER
received vm from 6/13 at 3:29pm-A hi, my name is Rose Leija, I am calling about a, a verbal, our order I got from Dr Maya Villalba, It wasn't entered actually onto my chart  I was told to get a bun and creat test 2 weeks prior to my MRI, which is June 28th  And I just was at the lab, they had a BMP (then unable to hear vm)  And my number is [phone] date of birth, october 30th 1950  Thank you   ------------------------------------------------  per chart, pt had CMP completed yesterday that was ordered by another provider        Nothing further needed as bun/creat are part on CMP    Left detailed message for pt per communication consent regarding above

## 2023-06-28 ENCOUNTER — HOSPITAL ENCOUNTER (OUTPATIENT)
Dept: MRI IMAGING | Facility: HOSPITAL | Age: 73
Discharge: HOME | End: 2023-06-28
Payer: MEDICARE

## 2023-06-28 DIAGNOSIS — E23.7 PITUITARY LESION (HCC): ICD-10-CM

## 2023-06-28 DIAGNOSIS — H53.2 DIPLOPIA: ICD-10-CM

## 2023-06-28 PROCEDURE — A9585 GADOBUTROL INJECTION: HCPCS | Performed by: PSYCHIATRY & NEUROLOGY

## 2023-06-28 PROCEDURE — 70553 MRI BRAIN STEM W/O & W/DYE: CPT

## 2023-06-28 PROCEDURE — G1004 CDSM NDSC: HCPCS

## 2023-06-28 RX ADMIN — GADOBUTROL 7.5 ML: 604.72 INJECTION INTRAVENOUS at 15:41

## 2023-07-08 PROCEDURE — NC001 PR NO CHARGE: Performed by: UROLOGY

## 2023-07-08 NOTE — H&P
H&P Exam - Urology   Barbara Vick 67 y.o. male MRN: [id number]  Unit/Bed#:  Encounter: [id number]    Assessment/Plan     Assessment:  Elevated PSA 5.1  PI-RADS 4 lesion of the prostate on multiparametric MRI  Plan:  MRI fusion guided transrectal ultrasound-guided transperineal needle biopsies of the prostate    History of Present Illness   HPI:  Barbara Vick is a 67 y.o. male who presents with an elevated PSA. In February 2021 his PSA was 3.7 and leonardo to a PSA of 5.13 2023 and then on repeat in May 2023 was back down to 4.1. Multiparametric MRI of the prostate however revealed a 1.0 x 0.7 x 0.5 cm lesion in the apical posterior lateral left peripheral zone of the prostate, PI-RADS 4. The patient now presents for MR fusion prostate biopsy. I met the patient in the holding area discussed the procedure as proposed step-by-step answered all patient questions performed history and physical and obtained verbal and signed informed consent. The patient is aware the possibility of bleeding infection false negative biopsies urinary retention rectal injury possible need for Bai catheter or operative intervention. Patient agreed to the procedure. .    Review of Systems    Historical Information   Past Medical History:   Diagnosis Date   • Anemia    • Bladder cancer ([address]) 2002   • BPH with obstruction/lower urinary tract symptoms    • Depression    • Erectile dysfunction    • Gross hematuria    • Hypertension    • Hypothyroid    • Malignant neoplasm of lateral wall of urinary bladder (HCC)    • Nocturia      Past Surgical History:   Procedure Laterality Date   • BLADDER SURGERY  05/2002    bladder cancer surgery   • CHOLECYSTECTOMY      Laparoscopic, age 36 something, per allscripts   • CYSTECTOMY, PARTIAL     • CYSTOSCOPY  2010    w/ biopsy   • CYSTOSCOPY  2012,2013,2015,2016   • KNEE ARTHROSCOPY      Therapeutic, Last assessed: 5/25/16   • MOUTH SURGERY  09/2020   • WA TENDON SHEATH INCISION Left 11/11/2022 Procedure: RELEASE LEFT SMALL TRIGGER FINGER;  Surgeon: Sushil Renee;  Location:  MAIN OR;  Service: Orthopedics     Social History   Social History     Substance and Sexual Activity   Alcohol Use No     Social History     Substance and Sexual Activity   Drug Use No     Social History     Tobacco Use   Smoking Status Former   • Types: Cigarettes   • Quit date: 1990   • Years since quittin.9   Smokeless Tobacco Never   Tobacco Comments    quit in , per allscripts, No secondhand smoke exposure, per allscripts     Family History:   Family History   Problem Relation Age of Onset   • Alzheimer's disease Mother    • Breast cancer Mother    • Anxiety disorder Mother         generalized   • Stroke Mother         syndrome   • Cancer Brother         bladder   • Prostate cancer Brother    • Hypertension Family    • Cancer Family    • Alzheimer's disease Father    • Hypertension Father        Meds/Allergies   all medications and allergies reviewed  No Known Allergies    Objective   Vitals: There were no vitals taken for this visit. No intake/output data recorded. Invasive Devices     None                 Physical Exam  Vitals reviewed. Constitutional:       General: He is not in acute distress. Appearance: Normal appearance. He is normal weight. He is not ill-appearing, toxic-appearing or diaphoretic. HENT:      Head: Normocephalic and atraumatic. Nose: Nose normal.   Eyes:      Extraocular Movements: Extraocular movements intact. Cardiovascular:      Rate and Rhythm: Normal rate and regular rhythm. Pulmonary:      Effort: Pulmonary effort is normal. No respiratory distress. Abdominal:      General: There is no distension. Palpations: Abdomen is soft. Tenderness: There is no abdominal tenderness. There is no guarding or rebound. Musculoskeletal:         General: Normal range of motion. Skin:     General: Skin is dry.    Neurological:      Mental Status: He is alert and oriented to person, place, and time. Psychiatric:         Mood and Affect: Mood normal.         Behavior: Behavior normal.         Thought Content: Thought content normal.         Judgment: Judgment normal.         Lab Results: I have personally reviewed pertinent reports. Imaging: I have personally reviewed pertinent reports. and I have personally reviewed pertinent films in PACS  EKG, Pathology, and Other Studies: I have personally reviewed pertinent reports. and I have personally reviewed pertinent films in PACS  VTE Prophylaxis: Sequential compression device Annette Landry     Code Status: No Order  Advance Directive and Living Will:      Power of :    POLST:      Counseling / Coordination of Care  Total floor / unit time spent today 30 minutes. Greater than 50% of total time was spent with the patient and / or family counseling and / or coordination of care. A description of the counseling / coordination of care: Destini Frances

## 2023-07-08 NOTE — DISCHARGE INSTRUCTIONS
Vigorous oral fluid intake and limited activity for the next 24 to 48 hours. Report any excessive bleeding difficulty urinating or fevers       prostate Biopsy   WHAT YOU NEED TO KNOW:   A prostate biopsy is a procedure to remove samples of tissue from your prostate gland. The prostate is a male sex gland that makes fluid found in semen. It is located just below the bladder. After the samples are removed, they are sent to a lab and tested for cancer. DISCHARGE INSTRUCTIONS:   Seek care immediately if:   You have heavy bleeding from your rectum. You urinate very little or not at all. You have pain from your procedure that gets worse, even after you take pain medicine. Call your urologist or doctor if:   You have a fever or chills. You feel pain or burning when you urinate. Your urine is cloudy or smells bad. You have questions or concerns about your condition or care. Medicines: You may need any of the following:  Antibiotics  help prevent or treat a bacterial infection. Acetaminophen  decreases pain and fever. It is available without a doctor's order. Ask how much to take and how often to take it. Follow directions. Read the labels of all other medicines you are using to see if they also contain acetaminophen, or ask your doctor or pharmacist. Acetaminophen can cause liver damage if not taken correctly. Prescription pain medicine  may be given. Ask your healthcare provider how to take this medicine safely. Some prescription pain medicines contain acetaminophen. Do not take other medicines that contain acetaminophen without talking to your healthcare provider. Too much acetaminophen may cause liver damage. Prescription pain medicine may cause constipation. Ask your healthcare provider how to prevent or treat constipation. Take your medicine as directed. Contact your healthcare provider if you think your medicine is not helping or if you have side effects.  Tell your provider if you are allergic to any medicine. Keep a list of the medicines, vitamins, and herbs you take. Include the amounts, and when and why you take them. Bring the list or the pill bottles to follow-up visits. Carry your medicine list with you in case of an emergency. Follow up with your urologist or doctor as directed: You may need to return for more tests or procedures. Write down your questions so you remember to ask them during your visits. © Copyright Jorge Noconnie 2022 Information is for End User's use only and may not be sold, redistributed or otherwise used for commercial purposes. The above information is an  only. It is not intended as medical advice for individual conditions or treatments. Talk to your doctor, nurse or pharmacist before following any medical regimen to see if it is safe and effective for you.

## 2023-07-10 ENCOUNTER — OFFICE VISIT (OUTPATIENT)
Dept: LAB | Facility: HOSPITAL | Age: 73
End: 2023-07-10
Payer: MEDICARE

## 2023-07-10 DIAGNOSIS — Z01.810 PRE-OPERATIVE CARDIOVASCULAR EXAMINATION: ICD-10-CM

## 2023-07-10 DIAGNOSIS — R97.20 ELEVATED PSA: ICD-10-CM

## 2023-07-10 LAB
ATRIAL RATE: 60 BPM
P AXIS: 37 DEGREES
PR INTERVAL: 140 MS
QRS AXIS: -1 DEGREES
QRSD INTERVAL: 76 MS
QT INTERVAL: 388 MS
QTC INTERVAL: 388 MS
T WAVE AXIS: 5 DEGREES
VENTRICULAR RATE: 60 BPM

## 2023-07-10 PROCEDURE — 93010 ELECTROCARDIOGRAM REPORT: CPT

## 2023-07-10 PROCEDURE — 93005 ELECTROCARDIOGRAM TRACING: CPT

## 2023-07-11 ENCOUNTER — TELEPHONE (OUTPATIENT)
Dept: UROLOGY | Facility: MEDICAL CENTER | Age: 73
End: 2023-07-11

## 2023-07-11 DIAGNOSIS — N13.8 BENIGN PROSTATIC HYPERPLASIA WITH URINARY OBSTRUCTION: Primary | ICD-10-CM

## 2023-07-11 DIAGNOSIS — N40.1 BENIGN PROSTATIC HYPERPLASIA WITH URINARY OBSTRUCTION: Primary | ICD-10-CM

## 2023-07-11 DIAGNOSIS — R97.20 ELEVATED PSA: ICD-10-CM

## 2023-07-12 ENCOUNTER — TELEPHONE (OUTPATIENT)
Dept: UROLOGY | Facility: MEDICAL CENTER | Age: 73
End: 2023-07-12

## 2023-07-12 DIAGNOSIS — R35.0 FREQUENCY OF MICTURITION: ICD-10-CM

## 2023-07-12 DIAGNOSIS — R97.20 ELEVATED PSA: Primary | ICD-10-CM

## 2023-07-13 ENCOUNTER — TELEPHONE (OUTPATIENT)
Dept: UROLOGY | Facility: MEDICAL CENTER | Age: 73
End: 2023-07-13

## 2023-07-17 ENCOUNTER — TELEPHONE (OUTPATIENT)
Dept: UROLOGY | Facility: MEDICAL CENTER | Age: 73
End: 2023-07-17

## 2023-07-17 NOTE — TELEPHONE ENCOUNTER
Pt's chart was sent  to the pat for the anesthesiologist to look over his chart with regard to possible need for cardiac clearance s/p pre-op EKG

## 2023-07-20 ENCOUNTER — TELEPHONE (OUTPATIENT)
Dept: UROLOGY | Facility: MEDICAL CENTER | Age: 73
End: 2023-07-20

## 2023-07-24 ENCOUNTER — APPOINTMENT (OUTPATIENT)
Dept: LAB | Facility: MEDICAL CENTER | Age: 73
End: 2023-07-24
Attending: UROLOGY
Payer: MEDICARE

## 2023-07-24 DIAGNOSIS — N40.1 BENIGN PROSTATIC HYPERPLASIA WITH URINARY OBSTRUCTION: ICD-10-CM

## 2023-07-24 DIAGNOSIS — N13.8 BENIGN PROSTATIC HYPERPLASIA WITH URINARY OBSTRUCTION: ICD-10-CM

## 2023-07-24 PROCEDURE — 87147 CULTURE TYPE IMMUNOLOGIC: CPT

## 2023-07-24 PROCEDURE — 87086 URINE CULTURE/COLONY COUNT: CPT

## 2023-07-25 ENCOUNTER — TELEPHONE (OUTPATIENT)
Dept: UROLOGY | Facility: MEDICAL CENTER | Age: 73
End: 2023-07-25

## 2023-07-25 LAB — BACTERIA UR CULT: ABNORMAL

## 2023-07-25 NOTE — TELEPHONE ENCOUNTER
Called pt and left msg on VM per comm consent asking if he was contacted by [facility] referral.  Pt is scheduled for fusion biopsy on 8/1/23.

## 2023-07-26 ENCOUNTER — ANESTHESIA EVENT (OUTPATIENT)
Dept: GASTROENTEROLOGY | Facility: HOSPITAL | Age: 73
End: 2023-07-26
Payer: MEDICARE

## 2023-07-26 NOTE — TELEPHONE ENCOUNTER
I returned the patients call and spoke to his wife to advise that he has Medicare primary and it will not require any authorization. Per patients wife, he is actually asking if his EKG was going to be an issue for surgery. I do not see any evidence that surgical [facility] has reached out to him yet regarding referral placed 7/20/2023.

## 2023-07-27 ENCOUNTER — TELEPHONE (OUTPATIENT)
Dept: UROLOGY | Facility: MEDICAL CENTER | Age: 73
End: 2023-07-27

## 2023-07-27 NOTE — TELEPHONE ENCOUNTER
Patient transferred to me, I did again explain that [facility] will be contacting him to schedule.

## 2023-07-27 NOTE — TELEPHONE ENCOUNTER
Pt returning Ananth Morales call and would like a call back.     Please call pt back at [phone]

## 2023-07-27 NOTE — TELEPHONE ENCOUNTER
MD Sammy Mlulins CRNP; Raphael Eli MA; Radha Conklin Nurse; Tej Abdul  I think we are ok to proceed without additional cardiac testing     Edwige Singh           Previous Messages       ----- Message -----   From: PETTY Villafuerte   Sent: 7/27/2023  12:30 PM EDT   To: PETTY Villafuerte; Abel Botello MD; *   Subject: RE: cardiac clearance                             Shashi,       Assistance needed with this case. Noris Patel asked to review patient 2.2 abnormal EKG   Spoke to patient on the phone today   He admits being in well health.  Denies Cardiac HX.  +HTN controlled   METS is 8.     Denies chest pain and denies shortness of breath   States. .. [employer] years ago he had a similar experience (abnorm EKG)- I can't find it   He had a stress test (same time) - I found that from 5 years ago. It reads odd . [facility] I can't distinguish if it was + or- (can you review it, in epic 2017)   Besides that. .. its old > 2 years ago   Patient states he was told he was fine and started running. Question is. ... should he have clearances for this procedure ?

## 2023-07-27 NOTE — TELEPHONE ENCOUNTER
Patient calling to speak to [facility] about medical clearance. Transferred call to [facility].

## 2023-07-27 NOTE — TELEPHONE ENCOUNTER
Cole Sheehan  to [facility] • Soc Md/Np • Bam Fiore • Me • Lazarus Main, MD      7/26/23  9:14 AM   Hello Team,     EKG reviewed- as requested, SOC would also like to see patient for evaluation.  Thanks for the referral.       Ector Zayas, Can you reach out to patient and schedule SOC apt. with me please.  Let me know if there are any concerns.

## 2023-07-31 NOTE — ANESTHESIA PREPROCEDURE EVALUATION
Procedure:  TRANSPERINEAL MRI FUSION BIOPSY PROSTATE (Perineum)    Relevant Problems   CARDIO   (+) Essential hypertension   (+) Mixed hyperlipidemia      ENDO   (+) Acquired hypothyroidism      GI/HEPATIC   (+) GERD without esophagitis      /RENAL   (+) Benign prostatic hyperplasia with urinary obstruction   (+) Stage 3a chronic kidney disease (HCC)      MUSCULOSKELETAL   (+) Osteoarthritis of knee      NEURO/PSYCH   (+) Anxiety   (+) Diplopia   (+) Major depressive disorder, recurrent, in full remission ([address])        Physical Exam    Airway    Mallampati score: II  TM Distance: >3 FB  Neck ROM: full     Dental   No notable dental hx     Cardiovascular  Rhythm: regular, Rate: normal, Cardiovascular exam normal    Pulmonary  Pulmonary exam normal Breath sounds clear to auscultation,     Other Findings        Anesthesia Plan  ASA Score- 2     Anesthesia Type- IV sedation with anesthesia with ASA Monitors. Additional Monitors:   Airway Plan:     Comment: Discussed risks/benefits, including medication reactions, awareness, aspiration, and serious/life threatening complications. Plan to maintain native airway with IVGA, monitored with EtCO2. Plan Factors-Exercise tolerance (METS): >4 METS. Patient summary reviewed. Patient instructed to abstain from smoking on day of procedure. Patient did not smoke on day of surgery. Induction- intravenous. Postoperative Plan-     Informed Consent- Anesthetic plan and risks discussed with patient. I personally reviewed this patient with the CRNA. Discussed and agreed on the Anesthesia Plan with the CRNA. Kesha Valentin

## 2023-08-01 ENCOUNTER — ANESTHESIA (OUTPATIENT)
Dept: GASTROENTEROLOGY | Facility: HOSPITAL | Age: 73
End: 2023-08-01
Payer: MEDICARE

## 2023-08-01 ENCOUNTER — HOSPITAL ENCOUNTER (OUTPATIENT)
Facility: HOSPITAL | Age: 73
Setting detail: OUTPATIENT SURGERY
Discharge: HOME | End: 2023-08-01
Attending: UROLOGY | Admitting: UROLOGY
Payer: MEDICARE

## 2023-08-01 VITALS
SYSTOLIC BLOOD PRESSURE: 143 MMHG | HEART RATE: 58 BPM | HEIGHT: 67 IN | DIASTOLIC BLOOD PRESSURE: 67 MMHG | RESPIRATION RATE: 20 BRPM | TEMPERATURE: 96.8 F | WEIGHT: 180 LBS | OXYGEN SATURATION: 94 % | BODY MASS INDEX: 28.25 KG/M2

## 2023-08-01 DIAGNOSIS — R93.5 ABNORMAL MRI, PELVIS: ICD-10-CM

## 2023-08-01 DIAGNOSIS — R97.20 ELEVATED PSA: ICD-10-CM

## 2023-08-01 PROCEDURE — 55700 PR PROSTATE NEEDLE BIOPSY ANY APPROACH: CPT | Performed by: UROLOGY

## 2023-08-01 PROCEDURE — G0416 PROSTATE BIOPSY, ANY MTHD: HCPCS | Performed by: PATHOLOGY

## 2023-08-01 PROCEDURE — 76942 ECHO GUIDE FOR BIOPSY: CPT | Performed by: UROLOGY

## 2023-08-01 RX ORDER — EPHEDRINE SULFATE 50 MG/ML
INJECTION INTRAVENOUS AS NEEDED
Status: DISCONTINUED | OUTPATIENT
Start: 2023-08-01 | End: 2023-08-01

## 2023-08-01 RX ORDER — BUPIVACAINE HYDROCHLORIDE 5 MG/ML
INJECTION, SOLUTION EPIDURAL; INTRACAUDAL AS NEEDED
Status: DISCONTINUED | OUTPATIENT
Start: 2023-08-01 | End: 2023-08-01 | Stop reason: HOSPADM

## 2023-08-01 RX ORDER — PROPOFOL 10 MG/ML
INJECTION, EMULSION INTRAVENOUS CONTINUOUS PRN
Status: DISCONTINUED | OUTPATIENT
Start: 2023-08-01 | End: 2023-08-01

## 2023-08-01 RX ORDER — SODIUM CHLORIDE 9 MG/ML
INJECTION, SOLUTION INTRAVENOUS CONTINUOUS PRN
Status: DISCONTINUED | OUTPATIENT
Start: 2023-08-01 | End: 2023-08-01

## 2023-08-01 RX ORDER — HYDROCODONE BITARTRATE AND ACETAMINOPHEN 5; 325 MG/1; MG/1
1 TABLET ORAL EVERY 6 HOURS PRN
Status: CANCELLED | OUTPATIENT
Start: 2023-08-01

## 2023-08-01 RX ORDER — PROPOFOL 10 MG/ML
INJECTION, EMULSION INTRAVENOUS AS NEEDED
Status: DISCONTINUED | OUTPATIENT
Start: 2023-08-01 | End: 2023-08-01

## 2023-08-01 RX ORDER — FENTANYL CITRATE 50 UG/ML
INJECTION, SOLUTION INTRAMUSCULAR; INTRAVENOUS AS NEEDED
Status: DISCONTINUED | OUTPATIENT
Start: 2023-08-01 | End: 2023-08-01

## 2023-08-01 RX ORDER — LIDOCAINE HCL/PF 100 MG/5ML
SYRINGE (ML) INJECTION AS NEEDED
Status: DISCONTINUED | OUTPATIENT
Start: 2023-08-01 | End: 2023-08-01

## 2023-08-01 RX ORDER — LIDOCAINE HYDROCHLORIDE AND EPINEPHRINE 20; 5 MG/ML; UG/ML
INJECTION, SOLUTION EPIDURAL; INFILTRATION; INTRACAUDAL; PERINEURAL AS NEEDED
Status: DISCONTINUED | OUTPATIENT
Start: 2023-08-01 | End: 2023-08-01 | Stop reason: HOSPADM

## 2023-08-01 RX ADMIN — PROPOFOL 20 MG: 10 INJECTION, EMULSION INTRAVENOUS at 09:13

## 2023-08-01 RX ADMIN — PROPOFOL 20 MG: 10 INJECTION, EMULSION INTRAVENOUS at 09:14

## 2023-08-01 RX ADMIN — EPHEDRINE SULFATE 5 MG: 50 INJECTION INTRAVENOUS at 09:34

## 2023-08-01 RX ADMIN — PROPOFOL 80 MCG/KG/MIN: 10 INJECTION, EMULSION INTRAVENOUS at 09:10

## 2023-08-01 RX ADMIN — LIDOCAINE HYDROCHLORIDE 100 MG: 20 INJECTION INTRAVENOUS at 09:11

## 2023-08-01 RX ADMIN — PROPOFOL 80 MG: 10 INJECTION, EMULSION INTRAVENOUS at 09:11

## 2023-08-01 RX ADMIN — EPHEDRINE SULFATE 10 MG: 50 INJECTION INTRAVENOUS at 09:25

## 2023-08-01 RX ADMIN — FENTANYL CITRATE 50 MCG: 50 INJECTION INTRAMUSCULAR; INTRAVENOUS at 09:10

## 2023-08-01 RX ADMIN — SODIUM CHLORIDE: 0.9 INJECTION, SOLUTION INTRAVENOUS at 09:00

## 2023-08-01 RX ADMIN — CEFTRIAXONE SODIUM 1000 MG: 10 INJECTION, POWDER, FOR SOLUTION INTRAVENOUS at 08:55

## 2023-08-01 NOTE — OP NOTE
OPERATIVE REPORT  PATIENT NAME: Delora Boxer    :  1950  MRN: [id number]  Pt Location: [facility] ROOM 01    SURGERY DATE: 2023    Surgeon(s) and Role:     * Reyna Diana MD - Primary    Preop Diagnosis:  Elevated PSA [R97.20]  Abnormal MRI, pelvis [R93.5]-PI-RADS 4 lesion of the prostate on multiparametric MRI    Post-Op Diagnosis Codes:     * Elevated PSA [R97.20]     * Abnormal MRI, pelvis [R93.5]-PI-RADS 4 lesion of the prostate on multiparametric MRI    Procedure(s):  TRANSPERINEAL MRI FUSION BIOPSY PROSTATE-MR fusion guided transrectal ultrasound-guided transperineal needle biopsies of the prostate    Specimen(s):  ID Type Source Tests Collected by Time Destination   1 : OLGA x6 Tissue Prostate TISSUE Jesse Diana MD 2023 09    2 : Left Anterior Medial x2 Tissue Prostate TISSUE EXAM Reyna Diana MD 2023 0920    3 : Left Anterior Lateral x2 Tissue Prostate TISSUE EXAM Reyna Diana MD 2023 0920    4 : Left Transitional Zone x2 Tissue Prostate TISSUE EXAM Reyna Diana MD 2023 0920    5 : Right Anterior Medial x2 Tissue Prostate TISSUE EXAM Reyna Diana MD 202320    6 : Right Anterior Lateral x2 Tissue Prostate TISSUE EXAM Reyna Diana MD 2023 0920    7 : Right Transitional Zone x2 Tissue Prostate TISSUE EXAM Reyna Diana MD 2023 0920    8 : Right Posterior Medial x2 Tissue Prostate TISSUE EXAM Reyna Diana MD 2023 0920    9 : Right Posterior Lateral x2 Tissue Prostate TISSUE EXAM Reyna Diana MD 2023 0920    10 : Right Posterior Base x2 Tissue Prostate TISSUE EXAM Reyna Diana MD 2023 0920    11 : Left Posterior Medial x2 Tissue Prostate TISSUE Jesse Diana MD 2023 09    12 : Left Posterior Lateral x2 Tissue Prostate TISSUE EXAM Reyna Diana MD 2023 0920    13 : Left Posterior Base x2 Tissue Prostate TISSUE EXAM Reyna Diana MD 2023 9648 Estimated Blood Loss:   Minimal    Drains:  * No LDAs found *    Anesthesia Type:   Choice    Operative Indications:  Elevated PSA [R97.20]  Abnormal MRI, pelvis [R93.5]  -PI-RADS 4 lesion on multiparametric MRI of the prostate    Operative Findings:  Left peripheral zone calcifications, normal seminal vesicles, no hypoechoic peripheral zone lesions noted on ultrasound. Region of interest biopsied x6 cores under MR fusion guidance with 24 cores, 2 cores per each region, of regional biopsies. Grand total 30 cores obtained    Complications:   None    Procedure and Technique:  I spoke to the patient in the holding area discussed the proposed procedure step-by-step answered all patient questions performed history and physical and explained risks and complications as per history and physical.  The patient expressed understanding and provided both verbal and signed informed consent. The patient was taken to the GI suite placed in the dorsolithotomy position prepped and draped in usual sterile fashion after intravenous sedation was induced and after appropriate timeout. Using lidocaine 2% with epinephrine and 0.5% Marcaine without epinephrine in equal amounts the skin of the perineum in the midline into the left and right of the midline was anesthetized. Additional anesthetic mixture was placed in the subcutaneous plane and then after placement of a condom covered lubricated transrectal ultrasound probe additional anesthetic mixture was placed in the subcutaneous periapical tissues of the prostate on the left and right. After completion of the anesthesia administration a transverse ultrasound sweep of the prostate seminal vesicles took place and was provided for MR fusion matching. Using precision point apparatus 6 core needle biopsies of the region of interest was then obtained. An additional 24 cores were obtained in a regional fashion as outlined above. There is no evidence of complication.   After completion of the biopsies the ultrasound probe was used to tamponade the prostate for 3 minutes. Pressure was released and the patient was transferred to the PACU in good condition having tolerated the procedure well. There were no complications. The patient will meet with his urologic provider in 1 to 2 weeks to discuss biopsy results. I was present for the entire procedure. and I was present for all critical portions of the procedure.     Patient Disposition:  PACU         SIGNATURE: Rajan Lopez MD  DATE: August 1, 2023  TIME: 9:39 AM

## 2023-08-01 NOTE — PROGRESS NOTES
Discussed with Dr Anna Viera pt was NOT able to void after the procedure. Bladder scanned pt, total of 58ml in bladder at this time. Went over urinary rentention symptoms and when to return to hospital for lund catheter placement.  Pt acknowledges instructions

## 2023-08-01 NOTE — INTERVAL H&P NOTE
H&P reviewed. After examining the patient I find no changes in the patients condition since the H&P had been written.     Vitals:    08/01/23 0802   BP:    Pulse:    Resp:    Temp: (!) 96.9 °F (36.1 °C)   SpO2:

## 2023-08-01 NOTE — ANESTHESIA POSTPROCEDURE EVALUATION
Post-Op Assessment Note    CV Status:  Stable  Pain Score: 0    Pain management: adequate     Mental Status:  Sleepy and arousable   Hydration Status:  Stable   PONV Controlled:  Controlled   Airway Patency:  Patent      Post Op Vitals Reviewed: Yes      Staff: CRNA         There were no known notable events for this encounter.     BP   115/56   Temp     Pulse 56   Resp   16   SpO2   98

## 2023-08-02 PROCEDURE — G0416 PROSTATE BIOPSY, ANY MTHD: HCPCS | Performed by: PATHOLOGY

## 2023-08-11 ENCOUNTER — TELEPHONE (OUTPATIENT)
Dept: UROLOGY | Facility: MEDICAL CENTER | Age: 73
End: 2023-08-11

## 2023-08-11 ENCOUNTER — OFFICE VISIT (OUTPATIENT)
Dept: UROLOGY | Facility: MEDICAL CENTER | Age: 73
End: 2023-08-11
Payer: MEDICARE

## 2023-08-11 VITALS
DIASTOLIC BLOOD PRESSURE: 64 MMHG | HEART RATE: 54 BPM | HEIGHT: 67 IN | WEIGHT: 174 LBS | OXYGEN SATURATION: 93 % | BODY MASS INDEX: 27.31 KG/M2 | SYSTOLIC BLOOD PRESSURE: 122 MMHG

## 2023-08-11 DIAGNOSIS — C61 PROSTATE CANCER (HCC): Primary | ICD-10-CM

## 2023-08-11 PROCEDURE — 99214 OFFICE O/P EST MOD 30 MIN: CPT | Performed by: UROLOGY

## 2023-08-11 NOTE — PROGRESS NOTES
Chief complaint: Follow-up post prostate biopsy      HPI: 70-year-old male followed for an elevated PSA. He underwent transperineal prostate biopsies recently. He tolerated procedure well. He notes he is voiding well. There is no fever or gross hematuria. He returns to the office today with his wife to discuss his biopsy report. PSA: 4.1 on May 10, 2023    Multiparametric MRI (June 1, 2023): 43 g prostate with PI-RADS  4 lesion. No evidence of extraprostatic tumor, seminal vesicle invasion, pelvic lymphadenopathy or pelvic osseous metastatic disease. Transperineal biopsies revealed prostate cancer Mindy 3+3=6 in the index lesion. A random biopsy was positive for Mindy 3+4 equal 7. Impression: Prostate cancer- favorable intermediate risk    Plan: Long discussion patient and his wife regarding the presence of favorable risk intermediate grade prostate cancer. We discussed the [facility] nomograms which revealed excellent 15-year disease specific survival.  We also discussed the risk of extra prostatic  extension, seminal vesicle invasion and lymph node metastasis (2%). With the low risk of lymph node metastasis no further studies are indicated. We then had a long discussion regarding NCCN guidelines and the recommended treatment options which include active surveillance, robotic assisted radical prostatectomy and radiation therapy. The pros and cons of each were discussed. At the conclusion of her discussion I recommended the patient that he seek consultation with a robotic urologic surgeon, a radiation oncologist and that we obtain a Prolaris study to help with his treatment decision. We also discussed whether to proceed with a genetic oncology consultation as there is a family history of prostate cancer. At this point we will hold off on this. The patient will return here should he wish to discuss treatment options further.     40 minutes was spent in face-to-face discussion.

## 2023-08-25 ENCOUNTER — LAB REQUISITION (OUTPATIENT)
Dept: LAB | Facility: HOSPITAL | Age: 73
End: 2023-08-25

## 2023-08-25 DIAGNOSIS — R97.20 ELEVATED PROSTATE SPECIFIC ANTIGEN (PSA): ICD-10-CM

## 2023-08-25 DIAGNOSIS — R93.5 ABNORMAL FINDINGS ON DIAGNOSTIC IMAGING OF OTHER ABDOMINAL REGIONS, INCLUDING RETROPERITONEUM: ICD-10-CM

## 2023-09-07 ENCOUNTER — RADIATION ONCOLOGY CONSULT (OUTPATIENT)
Dept: RADIATION ONCOLOGY | Facility: CLINIC | Age: 73
End: 2023-09-07
Attending: RADIOLOGY
Payer: MEDICARE

## 2023-09-07 VITALS
OXYGEN SATURATION: 96 % | HEIGHT: 67 IN | TEMPERATURE: 98 F | DIASTOLIC BLOOD PRESSURE: 84 MMHG | HEART RATE: 48 BPM | SYSTOLIC BLOOD PRESSURE: 155 MMHG | BODY MASS INDEX: 28.55 KG/M2 | WEIGHT: 181.88 LBS

## 2023-09-07 DIAGNOSIS — C61 PROSTATE CANCER (HCC): Primary | ICD-10-CM

## 2023-09-07 DIAGNOSIS — Z85.51 PERSONAL HISTORY OF BLADDER CANCER: ICD-10-CM

## 2023-09-07 LAB — SCAN RESULT: NORMAL

## 2023-09-07 PROCEDURE — 99205 OFFICE O/P NEW HI 60 MIN: CPT | Performed by: RADIOLOGY

## 2023-09-07 PROCEDURE — 99211 OFF/OP EST MAY X REQ PHY/QHP: CPT | Performed by: RADIOLOGY

## 2023-09-07 NOTE — PROGRESS NOTES
Consultation - Radiation Oncology     VLV:[id number] : 1950  Encounter: [id number]  Patient Information: [address]  Chief Complaint   Patient presents with   • Consult     Cancer Staging   Prostate cancer [facility])  Staging form: Prostate, AJCC 8th Edition  - Clinical stage from 2023: Stage IIB (cT1c, cN0, cM0, PSA: 5.1, Grade Group: 2) - Signed by Mis Goyal MD on 2023  Histopathologic type: Adenocarcinoma, NOS  Stage prefix: Initial diagnosis  Prostate specific antigen (PSA) range: Less than 10  Mindy primary pattern: 3  Mindy secondary pattern: 4  Mindy score: 7  Histologic grading system: 5 grade system  Location of positive needle core biopsies: One side           History of Present Illness   Taqueria Spencer is a 67y.o. year old male who presents for discussion of RT for recently diagnosed [place] 7 (3+4) prostate cancer. Referred by Dr. Gita Phoenix. Additional medical problems include GERD, hypothyroidism, HTN, and he was diagnosed with bladder cancer . He underwent transurethral resection with no further treatment required. He has been under surveillance with yearly cystoscopy and has had no recurrences.       PSA Trend    PSA, Total   Latest Ref Rng 0.0 - 4.0 ng/mL   2019 2.7    12/10/2019 3.5    2/15/2021 3.7    3/20/2023 5.1 (H)    5/10/2023 4.1 (H)       23 Dr. Leigh De Souza  First elevated PSA several weeks ago. Will repeat and order MRI if still elevated      23  MRI of Prostate-First MRI  IMPRESSION:  1. PI-RADSv2.1 Category 4 -High (clinically significant cancer is likely to be present). 1.0 cm lesion in the posterior left peripheral zone at the apex. 2. No extraprostatic tumor, seminal vesicle invasion, pelvic lymphadenopathy, or pelvic osseous metastatic disease. 3. Calculated prostate volume of 43 cc.     23  Tissue Exam-First Biopsy  A.  Prostate, OLGA x6, needle biopsy:    - Prostatic adenocarcinoma,   -Mindy grade 3 + 3 = score  6  -Tumor involves  3 of 6 submitted cores  -Tumor involves approximately 15%, 30%, 80%% of  each core biopsy  - Perineural invasion:  Present.       B. Prostate, Left Anterior Medial x2, needle biopsy:    -Benign prostatic tissue.      C. Prostate, Left Anterior Lateral x2, needle biopsy:    -Benign prostatic tissue with mild chronic inflammation      D. Prostate, Left Transitional Zone x2, needle biopsy:    -Benign prostatic tissue      E. Prostate, Right Anterior Medial x2, needle biopsy:    -Benign prostatic tissue         F. Prostate, Right Anterior Lateral x2, needle biopsy:    -Benign prostatic tissue      G. Prostate, Right Transitional Zone x2, needle biopsy:    -Benign prostatic tissue      H. Prostate, Right Posterior Medial x2, needle biopsy:    -Benign prostatic tissue         I. Prostate, Right Posterior Lateral x2, needle biopsy:    -Benign prostatic tissue with moderate chronic inflammation      J. Prostate, Right Posterior Base x2, needle biopsy:    -Benign prostatic tissue      K. Prostate, Left Posterior Medial x2, needle biopsy:    -Benign prostatic tissue with moderate  acute & chronic inflammation      L. Prostate, Left Posterior Lateral x2, eedle biopsy:    - Prostatic adenocarcinoma,   -[place] grade 3 + 4 = score  7  -Percentage of grade 4:  ~5%  -Cribriform pattern : not identified  -Tumor involves  2 submitted cores  -Tumor involves approximately 5% and 10%, of  each core biopsy  - Perineural invasion:  Present.       M. Prostate, Left Posterior Base x2, needle biopsy:    -Benign prostatic tissue      8/11/23  Dr. Telma Gardner  Favorable intermediate risk prostate cancer. Treatment options including active surveillance, surgery and RT discussed. Recommend consult with surgeon and radiation oncology. Prolaris ordered to aid with decision. Declined genetic counselor at this time. Patient seen for consultation today with his wife.   He reports stable nocturia x2 with no dysuria. He has been on Uroxatrol now for just over 1 year with an improved urinary stream.  He has not been sexually active for several years with no erections. He also has no a.m. erections. He remains active in his garden daily. He was a runner in the past but is no longer running. He denies any previous history of cancer including no skin cancers. He denies any previous radiation therapy and no chemotherapy. He and his wife have 2 children both sons. His sons are healthy. His father had no history of prostate carcinoma. He  from Alzheimer's disease at the age of 67. His mother  at the age of 80 from metastatic breast cancer. He has 1 sister who is healthy. He has a 80-year-old brother with a Me[place] score 4+3=7 prostate adenocarcinoma who had surgical resection 8 years ago. He developed recurrent disease in the prostate bed and is now undergoing salvage radiation therapy along with androgen deprivation therapy. He previously smoked cigarettes but quit in . He has not drank alcohol for many years. He is a retired . Upcomin23  Dr. Padmini Hedrick consultation to discuss surgery     Historical Information   Oncology History   Prostate cancer [facility])   2023 Initial Diagnosis    Prostate cancer ([address] [facility])     2023 Biopsy    A. Prostate, OLGA x6, needle biopsy:    - Prostatic adenocarcinoma,   -Meigs grade 3 + 3 = score  6  -Tumor involves  3 of 6 submitted cores  -Tumor involves approximately 15%, 30%, 80%% of  each core biopsy  - Perineural invasion:  Present. B. Prostate, Left Anterior Medial x2, needle biopsy:    -Benign prostatic tissue. C. Prostate, Left Anterior Lateral x2, needle biopsy:    -Benign prostatic tissue with mild chronic inflammation      D.  Prostate, Left Transitional Zone x2, needle biopsy:    -Benign prostatic tissue         E. Prostate, Right Anterior Medial x2, needle biopsy:    -Benign prostatic tissue         F. Prostate, Right Anterior Lateral x2, needle biopsy:    -Benign prostatic tissue         G. Prostate, Right Transitional Zone x2, needle biopsy:    -Benign prostatic tissue      H. Prostate, Right Posterior Medial x2, needle biopsy:    -Benign prostatic tissue         I. Prostate, Right Posterior Lateral x2, needle biopsy:    -Benign prostatic tissue with moderate chronic inflammation         J. Prostate, Right Posterior Base x2, needle biopsy:    -Benign prostatic tissue      K. Prostate, Left Posterior Medial x2, needle biopsy:    -Benign prostatic tissue with moderate  acute & chronic inflammation         L. Prostate, Left Posterior Lateral x2, eedle biopsy:    - Prostatic adenocarcinoma,   -[place] grade 3 + 4 = score  7  -Percentage of grade 4:  ~5%  -Cribriform pattern : not identified  -Tumor involves  2 submitted cores  -Tumor involves approximately 5% and 10%, of  each core biopsy  - Perineural invasion:  Present. M. Prostate, Left Posterior Base x2, needle biopsy:    -Benign prostatic tissue                     9/7/2023 -  Cancer Staged    Staging form: Prostate, AJCC 8th Edition  - Clinical stage from 9/7/2023: Stage IIB (cT1c, cN0, cM0, PSA: 5.1, Grade Group: 2) - Signed by Sylvain Rouse MD on 9/7/2023  Histopathologic type:  Adenocarcinoma, NOS  Stage prefix: Initial diagnosis  Prostate specific antigen (PSA) range: Less than 10  [place] primary pattern: 3  [place] secondary pattern: 4  [place] score: 7  Histologic grading system: 5 grade system  Location of positive needle core biopsies: One side         Past Medical History:   Diagnosis Date   • Anemia    • Bladder cancer ([address]) 2002   • BPH with obstruction/lower urinary tract symptoms    • Depression    • Erectile dysfunction    • Gross hematuria    • Hypertension    • Hypothyroid    • Malignant neoplasm of lateral wall of urinary bladder (HCC)    • Nocturia    • Prostate cancer [facility])      Past Surgical History:   Procedure Laterality Date   • BLADDER SURGERY  2002    bladder cancer surgery   • CHOLECYSTECTOMY      Laparoscopic, age 36 something, per allscripts   • CYSTECTOMY, PARTIAL     • CYSTOSCOPY      w/ biopsy   • CYSTOSCOPY  ,,,2016   • KNEE ARTHROSCOPY      Therapeutic, Last assessed: 16   • MOUTH SURGERY  2020   • CT BX PROSTATE STRTCTC SATURATION SAMPLING IMG GID N/A 2023    Procedure: TRANSPERINEAL MRI FUSION BIOPSY PROSTATE;  Surgeon: Kana Gastelum MD;  Location: [facility];  Service: Urology   • CT TENDON SHEATH INCISION Left 2022    Procedure: RELEASE LEFT SMALL TRIGGER FINGER;  Surgeon: Amy Ortez;  Location: [facility];  Service: Orthopedics       Family History   Problem Relation Age of Onset   • Alzheimer's disease Mother    • Breast cancer Mother    • Anxiety disorder Mother         generalized   • Stroke Mother         syndrome   • Cancer Brother         bladder   • Prostate cancer Brother    • Hypertension Family    • Cancer Family    • Alzheimer's disease Father    • Hypertension Father      Social History   Social History     Substance and Sexual Activity   Alcohol Use No     Social History     Substance and Sexual Activity   Drug Use Never     Social History     Tobacco Use   Smoking Status Former   • Packs/day: 1.50   • Years: 15.00   • Total pack years: 22.50   • Types: Cigarettes   • Quit date:    • Years since quittin.7   Smokeless Tobacco Never   Tobacco Comments    quit in , per allscripts, No secondhand smoke exposure, per allscripts     Meds/Allergies     Current Outpatient Medications:   •  alfuzosin (UROXATRAL) 10 mg 24 hr tablet, Take 1 tablet (10 mg total) by mouth daily, Disp: 90 tablet, Rfl: 3  •  ALPRAZolam (XANAX) 0.25 mg tablet, daily as needed, Disp: , Rfl:   •  buPROPion (WELLBUTRIN SR) 150 mg 12 hr tablet, Take 150 mg by mouth 2 (two) times a day, Disp: , Rfl: 0  •  escitalopram (LEXAPRO) 5 mg tablet, Take 1 tablet by mouth daily, Disp: , Rfl:   • fluticasone (FLONASE) 50 mcg/act nasal spray, 1 spray into each nostril daily (Patient taking differently: 1 spray into each nostril if needed), Disp: 11.1 mL, Rfl: 11  •  lamoTRIgine (LaMICtal) 200 MG tablet, Take 1 tablet by mouth 2 (two) times a day, Disp: , Rfl:   •  levothyroxine 125 mcg tablet, TAKE 1 TABLET BY MOUTH EVERY DAY, Disp: 90 tablet, Rfl: 3  •  losartan (COZAAR) 50 mg tablet, TAKE 1 TABLET BY MOUTH EVERY DAY, Disp: 90 tablet, Rfl: 1  •  traZODone (DESYREL) 50 mg tablet, Take 50 mg by mouth  , Disp: , Rfl:   No Known Allergies    Review of Systems  Constitutional: Negative. HENT: Negative. Eyes: Positive for visual disturbance (2 episodes of double vision March and April. Eye exam completed). Reading glassed   Respiratory: Negative. Cardiovascular: Negative. Gastrointestinal: Negative. Genitourinary: Negative for dysuria and hematuria. Nocturia x2   Musculoskeletal: Positive for back pain. Skin: Negative. Allergic/Immunologic: Positive for food allergies (possible lactose allergies). Neurological: Positive for numbness (bilateral lower legs and feet). Psychiatric/Behavioral: Negative.       Clinical Trial: no     IPSS Questionnaire (AUA-7): Over the past month…     1)  How often have you had a sensation of not emptying your bladder completely after you finish urinating? 0 - Not at all   2)  How often have you had to urinate again less than two hours after you finished urinating? 2 - Less than half the time   3)  How often have you found you stopped and started again several times when you urinated? 3 - About half the time   4) How difficult have you found it to postpone urination? 1 - Less than 1 time in 5   5) How often have you had a weak urinary stream?  1 - Less than 1 time in 5   6) How often have you had to push or strain to begin urination?   2 - Less than half the time   7) How many times did you most typically get up to urinate from the time you went to bed until the time you got up in the morning? 2 - 2 times   Total Score:  11      OBJECTIVE:   /84 (BP Location: Left arm, Patient Position: Sitting, Cuff Size: Large)   Pulse (!) 48   Temp 98 °F (36.7 °C) (Temporal)   Ht 5' 7" (1.702 m)   Wt 82.5 kg (181 lb 14.1 oz)   SpO2 96%   BMI 28.49 kg/m²   Pain Assessment:  0  Performance Status: ECOG/Zubrod/WHO: 0 - Asymptomatic    Physical Exam  Vitals and nursing note reviewed. Constitutional:       General: He is not in acute distress. Appearance: He is well-developed. He is not diaphoretic. HENT:      Head: Normocephalic and atraumatic. Mouth/Throat:      Pharynx: No oropharyngeal exudate. Eyes:      General: No scleral icterus. Conjunctiva/sclera: Conjunctivae normal.      Pupils: Pupils are equal, round, and reactive to light. Neck:      Thyroid: No thyromegaly. Trachea: No tracheal deviation. Cardiovascular:      Rate and Rhythm: Normal rate and regular rhythm. Heart sounds: Normal heart sounds. Pulmonary:      Effort: Pulmonary effort is normal. No respiratory distress. Breath sounds: Normal breath sounds. No stridor. No wheezing, rhonchi or rales. Chest:      Chest wall: No tenderness. Abdominal:      General: Bowel sounds are normal. There is no distension. Palpations: Abdomen is soft. There is no mass. Tenderness: There is no abdominal tenderness. Genitourinary:     Comments: Rectal examination deferred. Musculoskeletal:         General: No swelling or tenderness. Normal range of motion. Cervical back: Normal range of motion and neck supple. Lymphadenopathy:      Cervical: No cervical adenopathy. Upper Body:      Right upper body: No supraclavicular adenopathy. Left upper body: No supraclavicular adenopathy. Lower Body: No right inguinal adenopathy. No left inguinal adenopathy. Skin:     General: Skin is warm and dry. Coloration: Skin is not jaundiced or pale. Findings: No erythema or rash. Neurological:      General: No focal deficit present. Mental Status: He is alert and oriented to person, place, and time. Cranial Nerves: No cranial nerve deficit. Sensory: No sensory deficit. Motor: No weakness. Coordination: Coordination normal.   Psychiatric:         Mood and Affect: Mood normal.         Behavior: Behavior normal.         Thought Content: Thought content normal.         Judgment: Judgment normal.       RESULTS  Lab Results  Lab Results   Component Value Date    PSA 4.1 (H) 05/10/2023    PSA 5.1 (H) 03/20/2023    PSA 3.7 02/15/2021     Imaging Studies: See Above    Pathology: See Above    ASSESSMENT  1. Prostate cancer [facility])  Ambulatory Referral to Radiation Oncology    Radiation Simulation Treatment      2. Personal history of bladder cancer          Cancer Staging   Prostate cancer [facility])  Staging form: Prostate, AJCC 8th Edition  - Clinical stage from 9/7/2023: Stage IIB (cT1c, cN0, cM0, PSA: 5.1, Grade Group: 2) - Signed by Clarissa Brunner MD on 9/7/2023  Histopathologic type: Adenocarcinoma, NOS  Stage prefix: Initial diagnosis  Prostate specific antigen (PSA) range: Less than 10  [place] primary pattern: 3  [place] secondary pattern: 4  [place] score: 7  Histologic grading system: 5 grade system  Location of positive needle core biopsies: One side      Risk Category: moderate  Bone Scan: no  Androgen Deprivation Therapy: no      PLAN/DISCUSSION  Orders Placed This Encounter   Procedures   • Radiation Simulation Treatment        Sony Fan is a 67y.o. year old male with with a clinical stage IIB, T1 cN0 M0 Mindy score 3+4=7 prostate adenocarcinoma diagnosed after an elevated PSA of 5.1 NG/mL March 28, 2023 that was repeated on May 10, 2023 and was 4.1 NG/mL. He had work-up that included a prostate MRI which revealed a suspicious 1 cm lesion category 4 in the posterior left peripheral zone.   There was no extraprostatic tumor with no seminal vesicle invasion, no pelvic lymphadenopathy, and no pelvic osseous metastatic disease. Prostate volume was 43 cc. He had his first prostate biopsies on August 1, 2023 that were positive in the region of interest for [place] score 3+3=6 disease and there was an additional left posterior lateral biopsy that was positive for [place] score 3+4=7 disease. He has been seen by Dr. Kathleen Aiken for his favorable intermediate risk early stage prostate adenocarcinoma. Treatment options were discussed with him including active surveillance, robotic surgery, and definitive radiation therapy. He is not interested in active surveillance. He is seen for consultation today to discuss definitive radiation therapy. He has an appointment coming up with Dr. John Hernandez to discuss robotic surgery on September 18, 2023. Prolaris testing was ordered and results are pending at this time. He had declined meeting with a genetic counselor but is now thinking about meeting with genetics and having genetic testing. We discussed definitive external beam radiation therapy with him using intensity modulated radiation therapy over 6 weeks with a hypofractionated course of treatment versus 9 weeks with conventional radiation therapy. I would recommend a hypofractionated course of radiation therapy alone without any androgen deprivation therapy since he has grade group 2 Mindy score 3+4=7 disease. We discussed placement of fiducial markers for daily image guided radiation therapy to improve the accuracy of treatment and reduce both short-term and long-term side effects. We discussed placement of a SpaceOAR to reduce dose to the rectum and prevent short-term side effects and long-term complications. He is in good health for his age and is considering surgical resection.   We did discuss that if he has upfront surgical resection, he would be able to have salvage radiation therapy in the future for any locally recurrent disease which is what his brother is currently going through. If he has definitive radiation therapy now, then it will be difficult to undergo a prostatectomy in the future. We also discussed that if he has upfront surgery, there is a small about 5% risk of adverse features on his final pathology such that he would require postoperative radiation therapy 4 to 6 months after he heals from surgery. We answered all of his questions and his wife questions today. He will make his final decision regarding treatment after he meets with Dr. Mercedes Krishnamurthy on September 18, 2023. Emilie Morse MD  9/7/2023,9:24 AM      Portions of the record may have been created with voice recognition software.  Occasional wrong word or "sound a like" substitutions may have occurred due to the inherent limitations of voice recognition software.  Read the chart carefully and recognize, using context, where substitutions have occurred.

## 2023-09-07 NOTE — PROGRESS NOTES
Terrance Morris 1950 is a 67 y.o. male Presents for discussion of RT for recently diagnosed Mindy 7 (3+4) prostate cancer. Referred by Dr. Romy Contreras. Additional medical problems include GERD, hypothyroidism, HTN, Additionally, diagnosed with bladder cancer 2004. Underwent transurethral resection. Has been under surveillance with yearly cystoscopy. PSA Trend   PSA, Total   Latest Ref Rng 0.0 - 4.0 ng/mL   9/13/2019 2.7    12/10/2019 3.5    2/15/2021 3.7    3/20/2023 5.1 (H)    5/10/2023 4.1 (H)       4/18/23 Dr. Romy Contreras  First elevated PSA several weeks ago. Will repeat and order MRI if still elevated     6/1/23  MRI of Prostate  IMPRESSION:  1. PI-RADSv2.1 Category 4 -High (clinically significant cancer is likely to be present). 1.0 cm lesion in the posterior left peripheral zone at the apex. 2. No extraprostatic tumor, seminal vesicle invasion, pelvic lymphadenopathy, or pelvic osseous metastatic disease. 3. Calculated prostate volume of 43 cc.    8/1/23  Tissue Exam  A. Prostate, OLGA x6, needle biopsy:    - Prostatic adenocarcinoma,   -[place]indy grade 3 + 3 = score  6  -Tumor involves  3 of 6 submitted cores  -Tumor involves approximately 15%, 30%, 80%% of  each core biopsy  - Perineural invasion:  Present. B. Prostate, Left Anterior Medial x2, needle biopsy:    -Benign prostatic tissue. C. Prostate, Left Anterior Lateral x2, needle biopsy:    -Benign prostatic tissue with mild chronic inflammation      D. Prostate, Left Transitional Zone x2, needle biopsy:    -Benign prostatic tissue         E. Prostate, Right Anterior Medial x2, needle biopsy:    -Benign prostatic tissue         F. Prostate, Right Anterior Lateral x2, needle biopsy:    -Benign prostatic tissue         G.  Prostate, Right Transitional Zone x2, needle biopsy:    -Benign prostatic tissue      H. Prostate, Right Posterior Medial x2, needle biopsy:    -Benign prostatic tissue         I. Prostate, Right Posterior Lateral x2, needle biopsy:    -Benign prostatic tissue with moderate chronic inflammation         J. Prostate, Right Posterior Base x2, needle biopsy:    -Benign prostatic tissue      K. Prostate, Left Posterior Medial x2, needle biopsy:    -Benign prostatic tissue with moderate  acute & chronic inflammation         L. Prostate, Left Posterior Lateral x2, eedle biopsy:    - Prostatic adenocarcinoma,   -[place] grade 3 + 4 = score  7  -Percentage of grade 4:  ~5%  -Cribriform pattern : not identified  -Tumor involves  2 submitted cores  -Tumor involves approximately 5% and 10%, of  each core biopsy  - Perineural invasion:  Present. M. Prostate, Left Posterior Base x2, needle biopsy:    -Benign prostatic tissue     8/11/23  Dr. David Orosco  Favorable intermediate risk prostate cancer. Treatment options including active surveillance, surgery and RT discussed. Recommend consult with surgeon and radiation oncology. Prolaris ordered to aid with decision. Declined genetic testing at this time. Upcoming  9/18/23  [facility]          Oncology History   Prostate cancer ([address])   8/1/2023 Initial Diagnosis    Prostate cancer ([address])     8/1/2023 Biopsy    A. Prostate, OLGA x6, needle biopsy:    - Prostatic adenocarcinoma,   -[place] grade 3 + 3 = score  6  -Tumor involves  3 of 6 submitted cores  -Tumor involves approximately 15%, 30%, 80%% of  each core biopsy  - Perineural invasion:  Present. B. Prostate, Left Anterior Medial x2, needle biopsy:    -Benign prostatic tissue. C. Prostate, Left Anterior Lateral x2, needle biopsy:    -Benign prostatic tissue with mild chronic inflammation      D. Prostate, Left Transitional Zone x2, needle biopsy:    -Benign prostatic tissue         E. Prostate, Right Anterior Medial x2, needle biopsy:    -Benign prostatic tissue         F. Prostate, Right Anterior Lateral x2, needle biopsy:    -Benign prostatic tissue         G.  Prostate, Right Transitional Zone x2, needle biopsy: -Benign prostatic tissue      H. Prostate, Right Posterior Medial x2, needle biopsy:    -Benign prostatic tissue         I. Prostate, Right Posterior Lateral x2, needle biopsy:    -Benign prostatic tissue with moderate chronic inflammation         J. Prostate, Right Posterior Base x2, needle biopsy:    -Benign prostatic tissue      K. Prostate, Left Posterior Medial x2, needle biopsy:    -Benign prostatic tissue with moderate  acute & chronic inflammation         L. Prostate, Left Posterior Lateral x2, eedle biopsy:    - Prostatic adenocarcinoma,   -[place] grade 3 + 4 = score  7  -Percentage of grade 4:  ~5%  -Cribriform pattern : not identified  -Tumor involves  2 submitted cores  -Tumor involves approximately 5% and 10%, of  each core biopsy  - Perineural invasion:  Present. M. Prostate, Left Posterior Base x2, needle biopsy:    -Benign prostatic tissue                         Review of Systems:  Review of Systems   Constitutional: Negative. HENT: Negative. Eyes: Positive for visual disturbance (2 episodes of double vision March and April. Eye exam completed). Reading glassed   Respiratory: Negative. Cardiovascular: Negative. Gastrointestinal: Negative. Genitourinary: Negative for dysuria and hematuria. Nocturia x2   Musculoskeletal: Positive for back pain. Skin: Negative. Allergic/Immunologic: Positive for food allergies (possible lactose allergies). Neurological: Positive for numbness (bilateral lower legs and feet). Psychiatric/Behavioral: Negative. Clinical Trial: no    IPSS Questionnaire (AUA-7): Over the past month…    1)  How often have you had a sensation of not emptying your bladder completely after you finish urinating? 0 - Not at all   2)  How often have you had to urinate again less than two hours after you finished urinating?  2 - Less than half the time   3)  How often have you found you stopped and started again several times when you urinated? 3 - About half the time   4) How difficult have you found it to postpone urination? 1 - Less than 1 time in 5   5) How often have you had a weak urinary stream?  1 - Less than 1 time in 5   6) How often have you had to push or strain to begin urination? 2 - Less than half the time   7) How many times did you most typically get up to urinate from the time you went to bed until the time you got up in the morning?   2 - 2 times   Total Score:  11       Pain assessment: 0      Prior Radiation: none    Teaching  [facility] radiation oncology booklet given and reviewed    MST completed    Implantable Devices (Port, pacemaker, pain stimulator)None    Hip Replacement: none    Health Maintenance   Topic Date Due   • Hepatitis C Screening  Never done   • PT PLAN OF CARE  05/11/2023   • Influenza Vaccine (1) 09/01/2023   • Colorectal Cancer Screening  10/08/2023   • Medicare Annual Wellness Visit (AWV)  10/17/2023   • Depression Remission PHQ  10/25/2023   • BMI: Followup Plan  03/06/2024   • Fall Risk  04/11/2024   • Falls: Plan of Care  04/25/2024   • BMI: Adult  08/11/2024   • Pneumococcal Vaccine: 65+ Years  Completed   • COVID-19 Vaccine  Completed   • HIB Vaccine  Aged Out   • IPV Vaccine  Aged Out   • Hepatitis A Vaccine  Aged Out   • Meningococcal ACWY Vaccine  Aged Out   • HPV Vaccine  Aged Out       Past Medical History:   Diagnosis Date   • Anemia    • Bladder cancer ([address]) 2002   • BPH with obstruction/lower urinary tract symptoms    • Depression    • Erectile dysfunction    • Gross hematuria    • Hypertension    • Hypothyroid    • Malignant neoplasm of lateral wall of urinary bladder ([address])    • Nocturia        Past Surgical History:   Procedure Laterality Date   • BLADDER SURGERY  05/2002    bladder cancer surgery   • CHOLECYSTECTOMY      Laparoscopic, age 36 something, per allscripts   • CYSTECTOMY, PARTIAL     • CYSTOSCOPY  2010    w/ biopsy   • CYSTOSCOPY  2012,2013,2015,2016   • KNEE ARTHROSCOPY Therapeutic, Last assessed: 16   • MOUTH SURGERY  2020   • [place] BX PROSTATE STRTCTC SATURATION SAMPLING IMG GID N/A 2023    Procedure: TRANSPERINEAL MRI FUSION BIOPSY PROSTATE;  Surgeon: Molly Murillo MD;  [facility];  Service: Urology   • [place] TENDON SHEATH INCISION Left 2022    Procedure: RELEASE LEFT SMALL TRIGGER FINGER;  Surgeon: Ceci Peterson;  Location: [facility];  Service: Orthopedics       Family History   Problem Relation Age of Onset   • Alzheimer's disease Mother    • Breast cancer Mother    • Anxiety disorder Mother         generalized   • Stroke Mother         syndrome   • Cancer Brother         bladder   • Prostate cancer Brother    • Hypertension Family    • Cancer Family    • Alzheimer's disease Father    • Hypertension Father        Social History     Tobacco Use   • Smoking status: Former     Types: Cigarettes     Quit date: 1990     Years since quittin.0   • Smokeless tobacco: Never   • Tobacco comments:     quit in , per allscripts, No secondhand smoke exposure, per allscripts   Vaping Use   • Vaping Use: Never used   Substance Use Topics   • Alcohol use: No   • Drug use: No          Current Outpatient Medications:   •  alfuzosin (UROXATRAL) 10 mg 24 hr tablet, Take 1 tablet (10 mg total) by mouth daily, Disp: 90 tablet, Rfl: 3  •  ALPRAZolam (XANAX) 0.25 mg tablet, daily as needed, Disp: , Rfl:   •  buPROPion (WELLBUTRIN SR) 150 mg 12 hr tablet, Take 150 mg by mouth 2 (two) times a day, Disp: , Rfl: 0  •  escitalopram (LEXAPRO) 5 mg tablet, Take 1 tablet by mouth daily, Disp: , Rfl:   •  fluticasone (FLONASE) 50 mcg/act nasal spray, 1 spray into each nostril daily (Patient taking differently: 1 spray into each nostril if needed), Disp: 11.1 mL, Rfl: 11  •  lamoTRIgine (LaMICtal) 200 MG tablet, Take 1 tablet by mouth 2 (two) times a day, Disp: , Rfl:   •  levothyroxine 125 mcg tablet, TAKE 1 TABLET BY MOUTH EVERY DAY, Disp: 90 tablet, Rfl: 3  •  losartan (COZAAR) 50 mg tablet, TAKE 1 TABLET BY MOUTH EVERY DAY, Disp: 90 tablet, Rfl: 1  •  traZODone (DESYREL) 50 mg tablet, Take 50 mg by mouth  , Disp: , Rfl:     No Known Allergies     There were no vitals filed for this visit.

## 2023-09-18 ENCOUNTER — OFFICE VISIT (OUTPATIENT)
Dept: UROLOGY | Facility: CLINIC | Age: 73
End: 2023-09-18
Payer: MEDICARE

## 2023-09-18 VITALS
BODY MASS INDEX: 27.94 KG/M2 | HEART RATE: 56 BPM | HEIGHT: 67 IN | SYSTOLIC BLOOD PRESSURE: 130 MMHG | DIASTOLIC BLOOD PRESSURE: 72 MMHG | WEIGHT: 178 LBS

## 2023-09-18 DIAGNOSIS — C61 PROSTATE CANCER (HCC): Primary | ICD-10-CM

## 2023-09-18 PROCEDURE — 99215 OFFICE O/P EST HI 40 MIN: CPT | Performed by: UROLOGY

## 2023-09-18 NOTE — PROGRESS NOTES
UROLOGY SECOND OPINION NOTE     CHIEF COMPLAINT   Lainey Daly is a 67 y.o. male with a complaint of   Chief Complaint   Patient presents with   • Follow-up   • Prostate Cancer       History of Present Illness:   Lainey Daly is a 67 y.o. male here for evaluation of recent prostate cancer diagnosis. Patient underwent transperineal biopsy on August 1. Had follow-up with Dr. Carol Gleason on August 11. Has seen radiation oncology. Presents today for discussion of surgical intervention.     Teresa score 1    Lab Results   Component Value Date    PSA 4.1 (H) 05/10/2023    PSA 5.1 (H) 03/20/2023    PSA 3.7 02/15/2021     Urinary Score(s)                Past Medical History:     Past Medical History:   Diagnosis Date   • Anemia    • Bladder cancer ([address]) 2002   • BPH with obstruction/lower urinary tract symptoms    • Depression    • Erectile dysfunction    • Gross hematuria    • Hypertension    • Hypothyroid    • Malignant neoplasm of lateral wall of urinary bladder (H[facility])    • Nocturia    • Prostate cancer ([address])        PAST SURGICAL HISTORY:     Past Surgical History:   Procedure Laterality Date   • BLADDER SURGERY  05/2002    bladder cancer surgery   • CHOLECYSTECTOMY      Laparoscopic, age 36 something, per allscripts   • CYSTECTOMY, PARTIAL     • CYSTOSCOPY  2010    w/ biopsy   • CYSTOSCOPY  2012,2013,2015,2016   • KNEE ARTHROSCOPY      Therapeutic, Last assessed: 5/25/16   • MOUTH SURGERY  09/2020   • [place] BX PROSTATE STRTCTC SATURATION SAMPLING IMG GID N/A 8/1/2023    Procedure: TRANSPERINEAL MRI FUSION BIOPSY PROSTATE;  Surgeon: Georgia Mills MD;  Location[facility];  Service: Urology   • [place] TENDON SHEATH INCISION Left 11/11/2022    Procedure: RELEASE LEFT SMALL TRIGGER FINGER;  Surgeon: Alissa Bailey;  Location:  MAIN OR;  Service: Orthopedics       CURRENT MEDICATIONS:     Current Outpatient Medications   Medication Sig Dispense Refill   • alfuzosin (UROXATRAL) 10 mg 24 hr tablet Take 1 tablet (10 mg total) by mouth daily 90 tablet 3   • ALPRAZolam (XANAX) 0.25 mg tablet daily as needed     • buPROPion (WELLBUTRIN SR) 150 mg 12 hr tablet Take 150 mg by mouth 2 (two) times a day  0   • escitalopram (LEXAPRO) 5 mg tablet Take 1 tablet by mouth daily     • fluticasone (FLONASE) 50 mcg/act nasal spray 1 spray into each nostril daily (Patient taking differently: 1 spray into each nostril if needed) 11.1 mL 11   • lamoTRIgine (LaMICtal) 200 MG tablet Take 1 tablet by mouth 2 (two) times a day     • levothyroxine 125 mcg tablet TAKE 1 TABLET BY MOUTH EVERY DAY 90 tablet 3   • losartan (COZAAR) 50 mg tablet TAKE 1 TABLET BY MOUTH EVERY DAY 90 tablet 1   • traZODone (DESYREL) 50 mg tablet Take 50 mg by mouth         No current facility-administered medications for this visit.        ALLERGIES:   No Known Allergies    SOCIAL HISTORY:     Social History     Socioeconomic History   • Marital status: /Civil Union     Spouse name: None   • Number of children: None   • Years of education: None   • Highest education level: None   Occupational History   • None   Tobacco Use   • Smoking status: Former     Packs/day: 1.50     Years: 15.00     Total pack years: 22.50     Types: Cigarettes     Quit date: 26     Years since quittin.7   • Smokeless tobacco: Never   • Tobacco comments:     quit in , per allscripts, No secondhand smoke exposure, per allscripts   Vaping Use   • Vaping Use: Never used   Substance and Sexual Activity   • Alcohol use: No   • Drug use: Never   • Sexual activity: None   Other Topics Concern   • None   Social History Narrative    Activities, leisure reading books    Recreational activities Running         Social Determinants of Health     Financial Resource Strain: [address] [facility]  (10/17/2022)    Overall Financial Resource Strain ([facility])    • Difficulty of Paying Living Expenses: Not very hard   Food Insecurity: Not on file   Transportation Needs: No Transportation Needs (10/17/2022)    Daryn Valdovinos Transportation    • Lack of Transportation (Medical): No    • Lack of Transportation (Non-Medical): No   Physical Activity: Not on file   Stress: Not on file   Social Connections: Not on file   Intimate Partner Violence: Not on file   Housing Stability: Not on file       SOCIAL HISTORY:     Family History   Problem Relation Age of Onset   • Alzheimer's disease Mother    • Breast cancer Mother    • Anxiety disorder Mother         generalized   • Stroke Mother         syndrome   • Cancer Brother         bladder   • Prostate cancer Brother    • Hypertension Family    • Cancer Family    • Alzheimer's disease Father    • Hypertension Father        REVIEW OF SYSTEMS:     Review of Systems   Constitutional: Negative for chills and fever. HENT: Negative for ear pain and sore throat. Eyes: Negative for pain and visual disturbance. Respiratory: Negative for cough and shortness of breath. Cardiovascular: Negative for chest pain and palpitations. Gastrointestinal: Negative for abdominal pain and vomiting. Genitourinary: Negative for dysuria and hematuria. Musculoskeletal: Negative for arthralgias and back pain. Skin: Negative for color change and rash. Neurological: Negative for seizures and syncope. All other systems reviewed and are negative. PHYSICAL EXAM:     /72 (BP Location: Left arm, Patient Position: Sitting, Cuff Size: Adult)   Pulse 56   Ht 5' 7" (1.702 m)   Wt 80.7 kg (178 lb)   BMI 27.88 kg/m²     Physical Exam  Vitals reviewed. Constitutional:       General: He is not in acute distress. Appearance: He is well-developed. HENT:      Head: Normocephalic and atraumatic. Eyes:      Pupils: Pupils are equal, round, and reactive to light. Cardiovascular:      Rate and Rhythm: Normal rate. Pulmonary:      Effort: Pulmonary effort is normal. No respiratory distress. Breath sounds: Normal breath sounds. Abdominal:      General: There is no distension. Palpations: Abdomen is soft. Tenderness: There is no abdominal tenderness. Musculoskeletal:         General: Normal range of motion. Cervical back: Normal range of motion and neck supple. Skin:     General: Skin is warm and dry. Neurological:      Mental Status: He is alert and oriented to person, place, and time. Psychiatric:         Behavior: Behavior normal.         LABS:     CBC:   Lab Results   Component Value Date    WBC 7.73 2023    HGB 14.1 2023    HCT 42.8 2023    MCV 91 2023     2023       BMP:   Lab Results   Component Value Date    CALCIUM 9.1 2023    K 4.3 2023    CO2 28 2023     2023    BUN 17 2023    CREATININE 1.15 2023         IMAGIN/1/23  MULTIPARAMETRIC MRI OF THE PROSTATE WITH AND WITHOUT CONTRAST-WITH 3-D POSTPROCESSING.     INDICATION:   R97.20: Elevated prostate specific antigen (PSA).     COMPARISON: None     PSA LEVEL: 4.1 ng/mL on May 12, 2023. PRIOR BIOPSY DATE: No prior biopsy. BIOPSY RESULTS: Not applicable.     TECHNIQUE: The following pulse sequences were obtained:  Small field-of-view axial T1-weighted and multiplanar T2-weighted images; DWI axial and ADC map; large field of view axial T2 weighted images; T1w in-phase and opposed-phase axials of entire pelvis   and dynamic 3D T1w axial before and during IV contrast injection.        CONTRAST:  Gadobutrol (Gadavist) 9 mL of Gadobutrol injection (SINGLE-DOSE)     TECHNICAL LIMITATIONS: None.     FINDINGS:     PROSTATE:  Size: 5.4 x 4.1 x 4.2 cm = 42.6 cc. Post-biopsy hemorrhage:  None. Central gland enlargement (BPH): Mild.     Focal lesions - localization as follows:     Lesion: 1  Size: 1.0 x 0.7 x 0.5 cm, series 450, image 16. Location: Apical posterior lateral left peripheral zone.   T2-weighted images: Score 3: Heterogeneous signal or noncircumscribed, rounded, moderate hypointensity; includes others that do not qualify as 2, 4 or 5. Diffusion-weighted images: Score 4: Focal markedly hypointense on ADC and markedly hyperintense on high b-value DWI; less than 1.5 cm in greatest dimension. Dynamic post-contrast images: (+) Focal, earlier or contemporaneous with, enhancement of adjacent normal prostatic tissues; corresponds to a finding on T2-weighted and/or DWI. PI-RADS Assessment Category: 4, High (clinically significant cancer is likely to be present). Extra-prostatic extension (EPE): Abuts capsule without visualized EPE.     SEMINAL VESICLES: Unremarkable     Note: Clinically significant cancer is defined on pathology/histology as Mindy score greater than or equal to 7, and/or volume of greater than or equal to 0.5 mL, and/or extraprostatic extension.     URINARY BLADDER: Unremarkable.     LYMPH NODES: No pelvic lymphadenopathy.     BONES: No suspicious osseous lesion.           IMPRESSION:     1. PI-RADSv2.1 Category 4 -High (clinically significant cancer is likely to be present). 1.0 cm lesion in the posterior left peripheral zone at the apex.     2. No extraprostatic tumor, seminal vesicle invasion, pelvic lymphadenopathy, or pelvic osseous metastatic disease.     3. Calculated prostate volume of 43 cc. PATHOLOGY:     8/1/23  Final Diagnosis   A. Prostate, OLGA x6, needle biopsy:    - Prostatic adenocarcinoma,   -[place] grade 3 + 3 = score  6  -Tumor involves  3 of 6 submitted cores  -Tumor involves approximately 15%, 30%, 80%% of  each core biopsy  - Perineural invasion:  Present.       B. Prostate, Left Anterior Medial x2, needle biopsy:    -Benign prostatic tissue.         C. Prostate, Left Anterior Lateral x2, needle biopsy:    -Benign prostatic tissue with mild chronic inflammation      D. Prostate, Left Transitional Zone x2, needle biopsy:    -Benign prostatic tissue         E. Prostate, Right Anterior Medial x2, needle biopsy:    -Benign prostatic tissue         F.  Prostate, Right Anterior Lateral x2, needle biopsy:    -Benign prostatic tissue         G. Prostate, Right Transitional Zone x2, needle biopsy:    -Benign prostatic tissue      H. Prostate, Right Posterior Medial x2, needle biopsy:    -Benign prostatic tissue         I. Prostate, Right Posterior Lateral x2, needle biopsy:    -Benign prostatic tissue with moderate chronic inflammation         J. Prostate, Right Posterior Base x2, needle biopsy:    -Benign prostatic tissue      K. Prostate, Left Posterior Medial x2, needle biopsy:    -Benign prostatic tissue with moderate  acute & chronic inflammation         L. Prostate, Left Posterior Lateral x2, eedle biopsy:    - Prostatic adenocarcinoma,   -[place] grade 3 + 4 = score  7  -Percentage of grade 4:  ~5%  -Cribriform pattern : not identified  -Tumor involves  2 submitted cores  -Tumor involves approximately 5% and 10%, of  each core biopsy  - Perineural invasion:  Present.          M. Prostate, Left Posterior Base x2, needle biopsy:    -Benign prostatic tissue          PROLARIS:         ASSESSMENT:     67 y.o. male  with prostate cancer    PLAN:     I had a lengthy discussion with the patient. I personally reviewed his films and images for surgical feasibility including his recent multiparametric MRI. We had an in-depth counseling and review session of his pathology and the subsequent Prolaris genomic testing. Patient has favorable intermediate risk disease but primarily low risk Mindy 6. Portion of Mindy 3+4 equal 7 is less than 5% and 10% of the 2 cores taken from the same area. We discussed management strategies. I certainly think the patient would likely be best served by an active surveillance protocol. We discussed that with patients with low volume Mindy 3+4 equal 7 with favorable genomic testing, active surveillance is an option. This avoids the risks of an active treatment algorithm.   Active surveillance history and guideline appropriateness was discussed in the timeline for testing and additional films and biopsies was reviewed. Was provided a handout from the American [employer] Association regarding active surveillance. Patient has seen radiation oncology to review this option. He would not require androgen deprivation therapy again given the risk profile of his disease. Fiducial markers and SpaceOAR perirectal gel could be utilized. Patient understands that it would be challenging to apply secondary surgery after radiation as a primary treatment should his disease recur however given nomogram probability of treatment, patient would be at low risk of recurrent disease. The vast majority of the visit was spent discussing robotic assisted laparoscopic radical prostatectomy. We discussed how the procedures performed along with the inherent risks and benefits. Patient was primarily focused on intraoperative risks and postoperative recovery. 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 He has followed with neurology without significant concern. Given the need for steep Trendelenburg position, if the patient were to opt for surgical intervention, we would need to seriously consider nephrology clearance as Trendelenburg position can further increase intracranial pressures. Otherwise, the patient does not seem to have significant medical comorbidities that would preclude surgical intervention. We discussed the hospital stay and recovery. We primarily discussed continence and return to continence control. Patient does not have good erectile function at baseline and we would likely not perform nerve sparing for this patient should we opt for surgical intervention. After our discussion, the patient would like to further consider his options. He will contact me if and when he is interested in moving forward.   If he is interested in active surveillance protocol, he will continue with his primary urologist in this regard.

## 2023-09-20 ENCOUNTER — TELEPHONE (OUTPATIENT)
Age: 73
End: 2023-09-20

## 2023-09-20 NOTE — TELEPHONE ENCOUNTER
Pt called and he would like to talk to Dr. Mariluz Correa about his treatment.      Pt can be reached at [phone]

## 2023-09-21 DIAGNOSIS — C61 PROSTATE CANCER (HCC): Primary | ICD-10-CM

## 2023-09-26 ENCOUNTER — OFFICE VISIT (OUTPATIENT)
Dept: NEUROLOGY | Facility: CLINIC | Age: 73
End: 2023-09-26
Payer: MEDICARE

## 2023-09-26 VITALS
DIASTOLIC BLOOD PRESSURE: 80 MMHG | HEIGHT: 67 IN | BODY MASS INDEX: 27.78 KG/M2 | HEART RATE: 51 BPM | SYSTOLIC BLOOD PRESSURE: 130 MMHG | WEIGHT: 177 LBS

## 2023-09-26 DIAGNOSIS — G62.9 NEUROPATHY: ICD-10-CM

## 2023-09-26 DIAGNOSIS — E53.8 B12 DEFICIENCY: ICD-10-CM

## 2023-09-26 DIAGNOSIS — H53.2 DIPLOPIA: Primary | ICD-10-CM

## 2023-09-26 DIAGNOSIS — Z51.81 MEDICATION MONITORING ENCOUNTER: ICD-10-CM

## 2023-09-26 PROCEDURE — 99214 OFFICE O/P EST MOD 30 MIN: CPT | Performed by: PSYCHIATRY & NEUROLOGY

## 2023-09-26 RX ORDER — CYANOCOBALAMIN 1000 UG/ML
1000 INJECTION, SOLUTION INTRAMUSCULAR; SUBCUTANEOUS
Qty: 6 ML | Refills: 0 | Status: SHIPPED | OUTPATIENT
Start: 2023-09-26 | End: 2023-10-12

## 2023-09-26 NOTE — PROGRESS NOTES
NEUROLOGY RESIDENCY CLINIC     PROGRESS NOTE         Patient ID: Linnette Patton is a 67 y.o. male. Assessment/Plan:    Diplopia  One additional episode of horizontal diplopia lasting 1-2 minutes and then resolving. Total of 3 episodes ever including this most recent episode. Exam today similar to last visit - the right eye does tend to lag behind the left eye particularly with horizontal movements, however, EOM are full in all directions. No diplopia provoked with EOM today. Etiology remains unclear. He has followed with Neuro-Ophthalmology. Will check myasthenia labs for completeness. Will also check lamotrigine level as lamotrigine can cause abnormal eye movements if supratherapeutic. Plan to follow up in 3 months. Neuropathy  Length-dependent decrease in sensation to all modalities in the lower extremities. There is some asymmetry with findings worse on the right suggesting there may be a superimposed radiculopathy. Have ordered EMG of the lower extremities to evaluate further. Additionally noted to have low vitamin B12 levels - have ordered IM supplementation as well as PO 1000 mcg daily. Diagnoses and all orders for this visit:    Diplopia  -     Acetylcholine receptor, binding; Future  -     Acetylcholine receptor, blocking; Future  -     MISCELLANEOUS LAB TEST; Future  -     MUSK Antibody; Future  -     Vitamin B12; Future  -     Lamotrigine level; Future    Neuropathy  -     EMG 2 limb lower extremity; Future    B12 deficiency  -     Vitamin B12; Future  -     cyanocobalamin 1,000 mcg/mL; Inject 1 mL (1,000 mcg total) into a muscle every 7 days for 6 doses    Medication monitoring encounter  -     Lamotrigine level; Future           Subjective:    HPI    I had the pleasure of seeing your patient, Linnette Patton, today in the Neurology clinic for routine follow up regarding Diplopia. Since last visit, patient reports doing well.  Had had only one additional episode of diplopia which occurred approximately 2 weeks after his last appointment. Episode lasted only 1-2 minutes, was mild. Diplopia was horizontal. Improved with covering either eye. Hand tremor not changed, only notices it when doing crossword puzzles, not bothersome    Balance - improved with PT some, no fall issues, still unsteady/uncertain on his feet. Went hiking over the summer and using hiking poles helped    The following portions of the patient's history were reviewed and updated as appropriate: allergies, current medications, past family history, past medical history, past social history, past surgical history, and problem list.         Objective:    Blood pressure 130/80, pulse (!) 51, height 5' 7" (1.702 m), weight 80.3 kg (177 lb). Physical Exam  Vitals and nursing note reviewed. Constitutional:       General: He is not in acute distress. Appearance: Normal appearance. He is not ill-appearing. HENT:      Head: Normocephalic and atraumatic. Nose: Nose normal.      Mouth/Throat:      Mouth: Mucous membranes are moist.      Pharynx: Oropharynx is clear. Eyes:      General: Lids are normal.      Conjunctiva/sclera: Conjunctivae normal.      Pupils: Pupils are equal, round, and reactive to light. Pulmonary:      Effort: Pulmonary effort is normal. No respiratory distress. Abdominal:      General: Abdomen is flat. There is no distension. Musculoskeletal:      Cervical back: Normal range of motion and neck supple. Right lower leg: No edema. Left lower leg: No edema. Skin:     General: Skin is warm and dry. Capillary Refill: Capillary refill takes less than 2 seconds. Neurological:      Motor: Motor strength is normal.     Coordination: Romberg sign positive. Deep Tendon Reflexes:      Reflex Scores:       Tricep reflexes are 2+ on the right side and 2+ on the left side. Bicep reflexes are 2+ on the right side and 2+ on the left side.        Brachioradialis reflexes are 2+ on the right side and 2+ on the left side. Patellar reflexes are 2+ on the right side and 2+ on the left side. Achilles reflexes are 1+ on the right side and 1+ on the left side. Psychiatric:         Mood and Affect: Mood normal.         Speech: Speech normal.         Behavior: Behavior normal.         Neurological Exam  Mental Status  Awake, alert and oriented to person, place and time. Speech is normal. Language is fluent with no aphasia. Cranial Nerves  CN II: Visual fields full to confrontation. Right funduscopic exam: disc intact. Left funduscopic exam: disc intact. CN III, IV, VI: Normal lids and orbits bilaterally. Pupils equal round and reactive to light bilaterally. CN V: Facial sensation is normal.  CN VII:  Right: There is no facial weakness. Left: There is no facial weakness. CN VIII: Hearing is normal.  CN IX, X: Palate elevates symmetrically  CN XI: Shoulder shrug strength is normal.  CN XII: Tongue midline without atrophy or fasciculations. While EOMs are full in all directions, the right eye tends to lag behind the left eye with all eye movements. .    Motor  Normal muscle bulk throughout. No fasciculations present. Normal muscle tone. No abnormal involuntary movements. Strength is 5/5 throughout all four extremities. Sensory  Acral decrease in sensation to all modalities in lower extremities. Symmetric reduction in temperature sensation from the mid-shin. Vibration is 7 seconds at the right knee and 6 seconds at left medial malleoli. Pinprick decreased in feet symmetrically. Position sense requires larger excursions.     Reflexes                                            Right                      Left  Brachioradialis                    2+                         2+  Biceps                                 2+                         2+  Triceps                                2+                         2+  Patellar                                2+                         2+  Achilles 1+                         1+    Coordination  Right: Finger-to-nose normal. Rapid alternating movement normal.Left: Finger-to-nose normal. Rapid alternating movement normal.    Gait  Casual gait is normal including stance, stride, and arm swing. Tandem gait abnormality: Romberg is present. Able to rise from chair without using arms. ROS:    Review of Systems   Constitutional: Negative for activity change, appetite change, chills, fatigue and fever. HENT: Negative for congestion, hearing loss, rhinorrhea, tinnitus and trouble swallowing. Eyes: Positive for visual disturbance. Negative for photophobia. Respiratory: Negative for cough and shortness of breath. Cardiovascular: Negative for chest pain, palpitations and leg swelling. Gastrointestinal: Negative for abdominal pain, diarrhea, nausea and vomiting. Genitourinary: Negative for difficulty urinating, dysuria and hematuria. (-) urinary incontinence   Musculoskeletal: Positive for gait problem. Negative for back pain, neck pain and neck stiffness. Skin: Negative for rash. Neurological: Negative for dizziness, tremors, seizures, syncope, facial asymmetry, speech difficulty, weakness, light-headedness, numbness and headaches. Psychiatric/Behavioral: Negative for confusion, dysphoric mood and sleep disturbance. The patient is not nervous/anxious. All other systems reviewed and are negative.          ======    I have discussed the patient's history, physical exam findings, assessment, and plan in detail with attending, Dr. Naseem Wagner    Thank you for allowing me to participate in the care of your patient, Radha Oquendo.     Morgan Heard DO  [facility], PGY-4

## 2023-09-26 NOTE — PATIENT INSTRUCTIONS
I will order B12 injections. Please call your primary doctor's office to schedule  Start taking B12 supplements (over the counter) 1000 - 2000 mcg daily  Labs: B12 level, myasthenia gravis labs, lamictal level  Please schedule EMG - this can determine if this is all neuropathy or if there is also a back issue contributing  A lot of times we do not find a specific cause of double vision.  But so far things are all looking good

## 2023-09-29 ENCOUNTER — TELEPHONE (OUTPATIENT)
Dept: RADIATION ONCOLOGY | Facility: CLINIC | Age: 73
End: 2023-09-29

## 2023-09-29 ENCOUNTER — TELEPHONE (OUTPATIENT)
Dept: INFUSION CENTER | Facility: CLINIC | Age: 73
End: 2023-09-29

## 2023-09-29 NOTE — TELEPHONE ENCOUNTER
As per communication consent, Left message on mobile inquiring if he has made any treatment decision.   Asked him to please return call, office number given

## 2023-10-09 PROBLEM — E53.8 B12 DEFICIENCY: Status: ACTIVE | Noted: 2023-10-09

## 2023-10-09 NOTE — ASSESSMENT & PLAN NOTE
One additional episode of horizontal diplopia lasting 1-2 minutes and then resolving. Total of 3 episodes ever including this most recent episode. Exam today similar to last visit - the right eye does tend to lag behind the left eye particularly with horizontal movements, however, EOM are full in all directions. No diplopia provoked with EOM today. Etiology remains unclear. He has followed with Neuro-Ophthalmology. Will check myasthenia labs for completeness. Will also check lamotrigine level as lamotrigine can cause abnormal eye movements if supratherapeutic. Plan to follow up in 3 months.

## 2023-10-09 NOTE — ASSESSMENT & PLAN NOTE
Length-dependent decrease in sensation to all modalities in the lower extremities. There is some asymmetry with findings worse on the right suggesting there may be a superimposed radiculopathy. Have ordered EMG of the lower extremities to evaluate further. Additionally noted to have low vitamin B12 levels - have ordered IM supplementation as well as PO 1000 mcg daily.

## 2023-10-12 RX ORDER — CYANOCOBALAMIN 1000 UG/ML
1000 INJECTION, SOLUTION INTRAMUSCULAR; SUBCUTANEOUS
Qty: 6 ML | Refills: 0 | Status: SHIPPED | OUTPATIENT
Start: 2023-10-12 | End: 2023-11-17

## 2023-10-17 ENCOUNTER — OFFICE VISIT (OUTPATIENT)
Dept: FAMILY MEDICINE CLINIC | Facility: CLINIC | Age: 73
End: 2023-10-17
Payer: MEDICARE

## 2023-10-17 VITALS
WEIGHT: 177 LBS | HEIGHT: 67 IN | BODY MASS INDEX: 27.78 KG/M2 | SYSTOLIC BLOOD PRESSURE: 138 MMHG | HEART RATE: 54 BPM | DIASTOLIC BLOOD PRESSURE: 80 MMHG

## 2023-10-17 DIAGNOSIS — E53.8 B12 DEFICIENCY: Primary | ICD-10-CM

## 2023-10-17 PROCEDURE — 99213 OFFICE O/P EST LOW 20 MIN: CPT | Performed by: PHYSICIAN ASSISTANT

## 2023-10-17 NOTE — PATIENT INSTRUCTIONS
Problem List Items Addressed This Visit          Other    B12 deficiency - Primary     Patient was seen by neurology and they prescribed B12 injections once weeklyFor 6 doses. Patient has not received any yet and does not want to continue to drive to [facility]) to get these done and so would like to have them done through our office. First 1 given today and then we will set him up for 5 more weekly injections with just a nurse visit.

## 2023-10-17 NOTE — PROGRESS NOTES
B12 administered in Right deltoid  Pt brought his own Lot#[id number] Exp 3/2025 [facility]  He will come back in Nursing schedule

## 2023-10-17 NOTE — PROGRESS NOTES
Name: Isma Abreu      : 1950      MRN: [id number]  Encounter Provider: Patrick Ferraro PA-C  Encounter Date: 10/17/2023   Encounter department: [facility]    Assessment & Plan     1. B12 deficiency  Assessment & Plan:  Patient was seen by neurology and they prescribed B12 injections once weeklyFor 6 doses. Patient has not received any yet and does not want to continue to drive to [facility] to get these done and so would like to have them done through our office. First 1 given today and then we will set him up for 5 more weekly injections with just a nurse visit. Subjective      PT was found to be B12 deficient and neurology wants him to have weekly B12 injections but pt can not do at home himself and does not want to travel to [facility] to neuro office for this. Is asking if we can give him these injections for the 6 doses. Review of Systems   Constitutional: Negative. HENT: Negative. Eyes: Negative. Respiratory: Negative. Cardiovascular: Negative. Gastrointestinal: Negative. Endocrine: Negative. Genitourinary: Negative. Musculoskeletal: Negative. Skin: Negative. Allergic/Immunologic: Negative. Neurological: Negative. Hematological: Negative. Psychiatric/Behavioral: Negative.          Current Outpatient Medications on File Prior to Visit   Medication Sig   • alfuzosin (UROXATRAL) 10 mg 24 hr tablet Take 1 tablet (10 mg total) by mouth daily   • ALPRAZolam (XANAX) 0.25 mg tablet daily as needed   • buPROPion (WELLBUTRIN SR) 150 mg 12 hr tablet Take 150 mg by mouth 2 (two) times a day   • cyanocobalamin 1,000 mcg/mL INJECT 1 ML (1,000 MCG TOTAL) INTO A MUSCLE EVERY 7 DAYS FOR 6 DOSES   • escitalopram (LEXAPRO) 5 mg tablet Take 1 tablet by mouth daily   • fluticasone (FLONASE) 50 mcg/act nasal spray 1 spray into each nostril daily (Patient taking differently: 1 spray into each nostril if needed)   • lamoTRIgine (LaMICtal) 200 MG tablet Take 1 tablet by mouth 2 (two) times a day   • levothyroxine 125 mcg tablet TAKE 1 TABLET BY MOUTH EVERY DAY   • losartan (COZAAR) 50 mg tablet TAKE 1 TABLET BY MOUTH EVERY DAY   • traZODone (DESYREL) 50 mg tablet Take 50 mg by mouth         Objective     /80   Pulse (!) 54   Ht 5' 7" (1.702 m)   Wt 80.3 kg (177 lb)   BMI 27.72 kg/m²     Physical Exam  Saskia Link PA-C

## 2023-10-17 NOTE — ASSESSMENT & PLAN NOTE
Patient was seen by neurology and they prescribed B12 injections once weeklyFor 6 doses. Patient has not received any yet and does not want to continue to drive to [facility]) to get these done and so would like to have them done through our office. First 1 given today and then we will set him up for 5 more weekly injections with just a nurse visit.

## 2023-10-23 DIAGNOSIS — N40.1 BENIGN PROSTATIC HYPERPLASIA WITH URINARY OBSTRUCTION: ICD-10-CM

## 2023-10-23 DIAGNOSIS — N13.8 BENIGN PROSTATIC HYPERPLASIA WITH URINARY OBSTRUCTION: ICD-10-CM

## 2023-10-23 RX ORDER — ALFUZOSIN HYDROCHLORIDE 10 MG/1
10 TABLET, EXTENDED RELEASE ORAL DAILY
Qty: 90 TABLET | Refills: 0 | Status: SHIPPED | OUTPATIENT
Start: 2023-10-23

## 2023-10-24 ENCOUNTER — CLINICAL SUPPORT (OUTPATIENT)
Dept: FAMILY MEDICINE CLINIC | Facility: CLINIC | Age: 73
End: 2023-10-24
Payer: MEDICARE

## 2023-10-24 DIAGNOSIS — E53.8 B12 DEFICIENCY: Primary | ICD-10-CM

## 2023-10-24 PROCEDURE — 96372 THER/PROPH/DIAG INJ SC/IM: CPT

## 2023-10-27 ENCOUNTER — APPOINTMENT (OUTPATIENT)
Dept: LAB | Facility: MEDICAL CENTER | Age: 73
End: 2023-10-27
Payer: MEDICARE

## 2023-10-27 DIAGNOSIS — E03.9 ACQUIRED HYPOTHYROIDISM: ICD-10-CM

## 2023-10-27 DIAGNOSIS — R73.9 HYPERGLYCEMIA: ICD-10-CM

## 2023-10-27 LAB
ALBUMIN SERPL BCP-MCNC: 4.2 G/DL (ref 3.5–5)
ALP SERPL-CCNC: 64 U/L (ref 34–104)
ALT SERPL W P-5'-P-CCNC: 13 U/L (ref 7–52)
ANION GAP SERPL CALCULATED.3IONS-SCNC: 9 MMOL/L
AST SERPL W P-5'-P-CCNC: 30 U/L (ref 13–39)
BILIRUB SERPL-MCNC: 0.67 MG/DL (ref 0.2–1)
BUN SERPL-MCNC: 18 MG/DL (ref 5–25)
CALCIUM SERPL-MCNC: 10.1 MG/DL (ref 8.4–10.2)
CHLORIDE SERPL-SCNC: 102 MMOL/L (ref 96–108)
CO2 SERPL-SCNC: 29 MMOL/L (ref 21–32)
CREAT SERPL-MCNC: 1.18 MG/DL (ref 0.6–1.3)
EST. AVERAGE GLUCOSE BLD GHB EST-MCNC: 123 MG/DL
GFR SERPL CREATININE-BSD FRML MDRD: 61 ML/MIN/1.73SQ M
GLUCOSE P FAST SERPL-MCNC: 106 MG/DL (ref 65–99)
HBA1C MFR BLD: 5.9 %
POTASSIUM SERPL-SCNC: 4.6 MMOL/L (ref 3.5–5.3)
PROT SERPL-MCNC: 7.4 G/DL (ref 6.4–8.4)
SODIUM SERPL-SCNC: 140 MMOL/L (ref 135–147)
TSH SERPL DL<=0.05 MIU/L-ACNC: 2.33 UIU/ML (ref 0.45–4.5)

## 2023-10-27 PROCEDURE — 83036 HEMOGLOBIN GLYCOSYLATED A1C: CPT

## 2023-10-27 PROCEDURE — 84443 ASSAY THYROID STIM HORMONE: CPT

## 2023-10-27 PROCEDURE — 80053 COMPREHEN METABOLIC PANEL: CPT

## 2023-10-27 PROCEDURE — 36415 COLL VENOUS BLD VENIPUNCTURE: CPT

## 2023-10-31 ENCOUNTER — RA CDI HCC (OUTPATIENT)
Dept: OTHER | Facility: HOSPITAL | Age: 73
End: 2023-10-31

## 2023-11-01 ENCOUNTER — OFFICE VISIT (OUTPATIENT)
Dept: FAMILY MEDICINE CLINIC | Facility: CLINIC | Age: 73
End: 2023-11-01
Payer: MEDICARE

## 2023-11-01 VITALS
WEIGHT: 177.6 LBS | HEART RATE: 60 BPM | BODY MASS INDEX: 27.88 KG/M2 | TEMPERATURE: 97.9 F | HEIGHT: 67 IN | SYSTOLIC BLOOD PRESSURE: 124 MMHG | DIASTOLIC BLOOD PRESSURE: 64 MMHG

## 2023-11-01 DIAGNOSIS — I10 ESSENTIAL HYPERTENSION: Primary | ICD-10-CM

## 2023-11-01 DIAGNOSIS — Z12.11 COLON CANCER SCREENING: ICD-10-CM

## 2023-11-01 DIAGNOSIS — H91.93 DECREASED HEARING OF BOTH EARS: ICD-10-CM

## 2023-11-01 DIAGNOSIS — F33.42 MAJOR DEPRESSIVE DISORDER, RECURRENT, IN FULL REMISSION (HCC): ICD-10-CM

## 2023-11-01 DIAGNOSIS — H61.22 IMPACTED CERUMEN OF LEFT EAR: ICD-10-CM

## 2023-11-01 DIAGNOSIS — R73.9 HYPERGLYCEMIA: ICD-10-CM

## 2023-11-01 DIAGNOSIS — E78.2 MIXED HYPERLIPIDEMIA: ICD-10-CM

## 2023-11-01 DIAGNOSIS — N18.31 STAGE 3A CHRONIC KIDNEY DISEASE (HCC): ICD-10-CM

## 2023-11-01 DIAGNOSIS — C61 PROSTATE CANCER (HCC): ICD-10-CM

## 2023-11-01 DIAGNOSIS — E03.9 ACQUIRED HYPOTHYROIDISM: ICD-10-CM

## 2023-11-01 PROCEDURE — G0439 PPPS, SUBSEQ VISIT: HCPCS | Performed by: PHYSICIAN ASSISTANT

## 2023-11-01 PROCEDURE — 99214 OFFICE O/P EST MOD 30 MIN: CPT | Performed by: PHYSICIAN ASSISTANT

## 2023-11-01 PROCEDURE — 69210 REMOVE IMPACTED EAR WAX UNI: CPT | Performed by: PHYSICIAN ASSISTANT

## 2023-11-01 NOTE — ASSESSMENT & PLAN NOTE
Patient not on any medications for cholesterol last checked was acceptable 6 months ago so we will repeat in 6 months which will make it yearly screening.

## 2023-11-01 NOTE — PROGRESS NOTES
Assessment and Plan:   Ear cerumen removal    Date/Time: 11/1/2023 10:10 AM    Performed by: Mely Park PA-C  Authorized by: Mely Park PA-C  Universal Protocol:  Consent: Verbal consent obtained. Consent given by: patient  Timeout called at: 11/1/2023 11:11 AM.  Patient understanding: patient states understanding of the procedure being performed  Patient identity confirmed: verbally with patient    Patient location:  Clinic  Procedure details:     Local anesthetic:  None    Location:  L ear    Procedure type: curette      Approach:  Natural orifice  Post-procedure details:     Complication:  None    Hearing quality:  Improved    Patient tolerance of procedure: Tolerated well, no immediate complications  Comments:      Copious amount of sticky wax removed in 2 large pieces from L canal.       Problem List Items Addressed This Visit        Endocrine    Acquired hypothyroidism     TSH within normal limits on levothyroxine 125. Relevant Orders    TSH, 3rd generation with Free T4 reflex       Cardiovascular and Mediastinum    Essential hypertension - Primary     Blood pressure very controlled on current medication. Nervous and Auditory    Impacted cerumen of left ear     L ear canal WNL after copious amount of was removed today. Relevant Orders    Ear cerumen removal (Completed)       Genitourinary    Stage 3a chronic kidney disease ([address] )     Lab Results   Component Value Date    EGFR 61 10/27/2023    EGFR 63 06/13/2023    EGFR 57 04/06/2023    CREATININE 1.18 10/27/2023    CREATININE 1.15 06/13/2023    CREATININE 1.25 04/06/2023   GFR very stable at 61 with a normal BUN/creatinine. Continue increasing fluids and avoidance of regular anti-inflammatories. Relevant Orders    CBC and differential    Prostate cancer ([address] )     Currently under active surveillance with urology.              Other    Major depressive disorder, recurrent, in full remission ([address] )     Followed by psychiatry. Stable no SI or HI. Hyperglycemia     Glucose only slightly elevated at 106 with an A1c of 5.9 continue to observe decrease simple carbs such as bread pasta potatoes rice junk for dessert and sweets. Relevant Orders    Comprehensive metabolic panel    Hemoglobin A1C    Mixed hyperlipidemia     Patient not on any medications for cholesterol last checked was acceptable 6 months ago so we will repeat in 6 months which will make it yearly screening. Relevant Orders    Lipid Panel with Direct LDL reflex    Decreased hearing of both ears     Refer to audiology for testing. Relevant Orders    Ambulatory Referral to Audiology   Other Visit Diagnoses     Colon cancer screening        Relevant Orders    Ambulatory Referral to Gastroenterology             Preventive health issues were discussed with patient, and age appropriate screening tests were ordered as noted in patient's After Visit Summary. Personalized health advice and appropriate referrals for health education or preventive services given if needed, as noted in patient's After Visit Summary. History of Present Illness:     Patient presents for a Medicare Wellness Visit      Verneda Prader is here for chronic conditions f/u.  Pt. had labs done prior to today's visit which included Recent Results (from the past 672 hour(s))  -Comprehensive metabolic panel:   Collection Time: 10/27/23 10:10 AM       Result                      Value             Ref Range           Sodium                      140               135 - 147 mm*       Potassium                   4.6               3.5 - 5.3 mm*       Chloride                    102               96 - 108 mmo*       CO2                         29                21 - 32 mmol*       ANION GAP                   9                 mmol/L              BUN                         18                5 - 25 mg/dL        Creatinine                  1.18              0.60 - 1.30 * Glucose, Fasting            106 (H)           65 - 99 mg/dL       Calcium                     10.1              8.4 - 10.2 m*       AST                         30                13 - 39 U/L         ALT                         13                7 - 52 U/L          Alkaline Phosphatase        64                34 - 104 U/L        Total Protein               7.4               6.4 - 8.4 g/*       Albumin                     4.2               3.5 - 5.0 g/*       Total Bilirubin             0.67              0.20 - 1.00 *       eGFR                        61                ml/min/1.73s*  -Hemoglobin A1C:   Collection Time: 10/27/23 10:10 AM       Result                      Value             Ref Range           Hemoglobin A1C              5.9 (H)           Normal 4.0-5*       EAG                         123               mg/dl          -TSH, 3rd generation with Free T4 reflex:   Collection Time: 10/27/23 10:10 AM       Result                      Value             Ref Range           TSH 3RD GENERATON           2.325             0.450 - 4.50*         Patient Care Team:  Zan Field PA-C as PCP - General (Family Medicine)  MD Zan Brandt PA-C  Lencho Lab, DO as Resident (Neurology)     Review of Systems:     Review of Systems   Constitutional: Negative. HENT: Negative. Eyes: Negative. Respiratory: Negative. Cardiovascular: Negative. Gastrointestinal: Negative. Endocrine: Negative. Genitourinary: Negative. Musculoskeletal: Negative. Skin: Negative. Allergic/Immunologic: Negative. Neurological: Negative. Hematological: Negative. Psychiatric/Behavioral: Negative.           Problem List:     Patient Active Problem List   Diagnosis   • Anxiety   • Major depressive disorder, recurrent, in full remission ([address])   • GERD without esophagitis   • Hyperglycemia   • Essential hypertension   • Acquired hypothyroidism   • Abnormal EKG   • Diplopia   • Equivocal stress test   • Benign prostatic hyperplasia with urinary obstruction   • Osteoarthritis of knee   • Medicare annual wellness visit, subsequent   • Personal history of bladder cancer   • Hip pain   • Lumbar radiculopathy   • Trigger finger   • Mixed hyperlipidemia   • Trigger little finger of left hand   • Dizziness   • Pituitary lesion (HCC)   • Neuropathy   • Essential tremor   • Idiopathic intracranial hypertension   • Elevated PSA   • Stage 3a chronic kidney disease (HCC)   • Disorder of optic nerve   • Neuropathy involving both lower extremities   • Prostate cancer (HCC)   • B12 deficiency   • Decreased hearing of both ears   • Impacted cerumen of left ear      Past Medical and Surgical History:     Past Medical History:   Diagnosis Date   • Anemia    • Bladder cancer ([address]) 2002   • BPH with obstruction/lower urinary tract symptoms    • Depression    • Erectile dysfunction    • Gross hematuria    • Hypertension    • Hypothyroid    • Malignant neoplasm of lateral wall of urinary bladder (HCC)    • Nocturia    • Prostate cancer [facility])      Past Surgical History:   Procedure Laterality Date   • BLADDER SURGERY  05/2002    bladder cancer surgery   • CHOLECYSTECTOMY      Laparoscopic, age 36 something, per allscripts   • CYSTECTOMY, PARTIAL     • CYSTOSCOPY  2010    w/ biopsy   • CYSTOSCOPY  2012,2013,2015,2016   • KNEE ARTHROSCOPY      Therapeutic, Last assessed: 5/25/16   • MOUTH SURGERY  09/2020   • DE BX PROSTATE STRTCTC SATURATION SAMPLING IMG GID N/A 8/1/2023    Procedure: TRANSPERINEAL MRI FUSION BIOPSY PROSTATE;  Surgeon: Joselito Montemayor MD;  Location: [facility];  Service: Urology   • DE TENDON SHEATH INCISION Left 11/11/2022    Procedure: RELEASE LEFT SMALL TRIGGER FINGER;  Surgeon: Kimberli Hernandez;  Location:  MAIN OR;  Service: Orthopedics      Family History:     Family History   Problem Relation Age of Onset   • Alzheimer's disease Mother    • Breast cancer Mother    • Anxiety disorder Mother generalized   • Stroke Mother         syndrome   • Cancer Brother         bladder   • Prostate cancer Brother    • Hypertension Family    • Cancer Family    • Alzheimer's disease Father    • Hypertension Father       Social History:     Social History     Socioeconomic History   • Marital status: /Civil Union     Spouse name: None   • Number of children: None   • Years of education: None   • Highest education level: None   Occupational History   • None   Tobacco Use   • Smoking status: Former     Packs/day: 1.50     Years: 15.00     Total pack years: 22.50     Types: Cigarettes     Quit date:      Years since quittin.8   • Smokeless tobacco: Never   • Tobacco comments:     quit in , per allscripts, No secondhand smoke exposure, per allscripts   Vaping Use   • Vaping Use: Never used   Substance and Sexual Activity   • Alcohol use: No   • Drug use: Never   • Sexual activity: None   Other Topics Concern   • None   Social History Narrative    Activities, leisure reading books    Recreational activities Running         Social Determinants of Health     Financial Resource Strain: Low Risk  (10/31/2023)    Overall Financial Resource Strain (CARDIA)    • Difficulty of Paying Living Expenses: Not very hard   Food Insecurity: Not on file   Transportation Needs: No Transportation Needs (10/31/2023)    PRAPARE - Transportation    • Lack of Transportation (Medical): No    • Lack of Transportation (Non-Medical):  No   Physical Activity: Not on file   Stress: Not on file   Social Connections: Not on file   Intimate Partner Violence: Not on file   Housing Stability: Not on file      Medications and Allergies:     Current Outpatient Medications   Medication Sig Dispense Refill   • alfuzosin (UROXATRAL) 10 mg 24 hr tablet Take 1 tablet (10 mg total) by mouth daily 90 tablet 0   • ALPRAZolam (XANAX) 0.25 mg tablet daily as needed     • buPROPion (WELLBUTRIN SR) 150 mg 12 hr tablet Take 150 mg by mouth 2 (two) times a day  0   • cyanocobalamin 1,000 mcg/mL INJECT 1 ML (1,000 MCG TOTAL) INTO A MUSCLE EVERY 7 DAYS FOR 6 DOSES 6 mL 0   • escitalopram (LEXAPRO) 5 mg tablet Take 1 tablet by mouth daily     • fluticasone (FLONASE) 50 mcg/act nasal spray 1 spray into each nostril daily (Patient taking differently: 1 spray into each nostril if needed) 11.1 mL 11   • lamoTRIgine (LaMICtal) 200 MG tablet Take 1 tablet by mouth 2 (two) times a day     • levothyroxine 125 mcg tablet TAKE 1 TABLET BY MOUTH EVERY DAY 90 tablet 3   • losartan (COZAAR) 50 mg tablet TAKE 1 TABLET BY MOUTH EVERY DAY 90 tablet 1   • traZODone (DESYREL) 50 mg tablet Take 50 mg by mouth         No current facility-administered medications for this visit. No Known Allergies   Immunizations:     Immunization History   Administered Date(s) Administered   • COVID-19 MODERNA VACC 0.5 ML IM 01/27/2021, 03/04/2021, 10/28/2021, 04/02/2022   • COVID-19 Pfizer Vac BIVALENT Rafael-sucrose 12 Yr+ IM 09/18/2022, 07/29/2023   • COVID-19 Pfizer mRNA vacc PF rafael-sucrose 12 yr and older ([address]) 10/16/2023   • H1N1, All Formulations 10/26/2009   • Hep A, ped/adol, 2 dose 10/24/2013   • INFLUENZA 11/09/2016, 11/21/2018, 11/08/2019, 12/01/2020, 10/22/2021, 10/01/2022, 09/08/2023   • Influenza Split High Dose Preservative Free IM 11/09/2016, 12/07/2017, 11/14/2018, 11/08/2019   • Influenza, high dose seasonal 0.7 mL 11/08/2019   • Pneumococcal Conjugate 13-Valent 07/12/2018   • Pneumococcal Polysaccharide PPV23 12/28/2015   • Tdap 12/07/2017   • Typhoid Live, oral 10/24/2013   • Yellow Fever 10/24/2013   • Zoster 08/27/2013   • Zoster Vaccine Recombinant 02/09/2022      Health Maintenance:         Topic Date Due   • Hepatitis C Screening  Never done   • Colorectal Cancer Screening  10/08/2023     There are no preventive care reminders to display for this patient. Medicare Screening Tests and Risk Assessments:     Loy Celio is here for his Subsequent Wellness visit.      Health Risk Assessment:   Patient rates overall health as good. Patient feels that their physical health rating is slightly worse. Patient is satisfied with their life. Eyesight was rated as same. Hearing was rated as slightly worse. Patient feels that their emotional and mental health rating is same. Patients states they are sometimes angry. Patient states they are sometimes unusually tired/fatigued. Pain experienced in the last 7 days has been some. Patient's pain rating has been 3/10. Patient states that he has experienced no weight loss or gain in last 6 months. Depression Screening:   PHQ-9 Score: 3      Fall Risk Screening: In the past year, patient has experienced: no history of falling in past year      Home Safety:  Patient does not have trouble with stairs inside or outside of their home. Patient has working smoke alarms and has working carbon monoxide detector. Home safety hazards include: none. Nutrition:   Current diet is Regular and Limited junk food. Medications:   Patient is currently taking over-the-counter supplements. OTC medications include: see medication list. Patient is able to manage medications. Activities of Daily Living (ADLs)/Instrumental Activities of Daily Living (IADLs):   Walk and transfer into and out of bed and chair?: Yes  Dress and groom yourself?: Yes    Bathe or shower yourself?: Yes    Feed yourself?  Yes  Do your laundry/housekeeping?: Yes  Manage your money, pay your bills and track your expenses?: Yes  Make your own meals?: Yes    Do your own shopping?: Yes    Durable Medical Equipment Suppliers  None    Previous Hospitalizations:   Any hospitalizations or ED visits within the last 12 months?: No      Advance Care Planning:   Living will: No    Durable POA for healthcare: No    Advanced directive: No    Advanced directive counseling given: Yes    ACP document given: Yes      Cognitive Screening:   Provider or family/friend/caregiver concerned regarding cognition?: No    PREVENTIVE SCREENINGS      Cardiovascular Screening:    General: Screening Not Indicated, History Lipid Disorder and Screening Current      Diabetes Screening:     General: Screening Current      Colorectal Cancer Screening:     General: Screening Current      Prostate Cancer Screening:    General: History Prostate Cancer and Screening Current      Osteoporosis Screening:    General: Screening Not Indicated      Abdominal Aortic Aneurysm (AAA) Screening:    Risk factors include: age between 70-77 yo and tobacco use        Lung Cancer Screening:     General: Screening Not Indicated      Hepatitis C Screening:    General: Risks and Benefits Discussed    Screening, Brief Intervention, and Referral to Treatment (SBIRT)    Screening  Typical number of drinks in a day: 0  Typical number of drinks in a week: 0  Interpretation: Low risk drinking behavior. AUDIT-C Screenin) How often did you have a drink containing alcohol in the past year? never  2) How many drinks did you have on a typical day when you were drinking in the past year? 0  3) How often did you have 6 or more drinks on one occasion in the past year? never    AUDIT-C Score: 0  Interpretation: Score 0-3 (male): Negative screen for alcohol misuse    Single Item Drug Screening:  How often have you used an illegal drug (including marijuana) or a prescription medication for non-medical reasons in the past year? never    Single Item Drug Screen Score: 0  Interpretation: Negative screen for possible drug use disorder    No results found. Physical Exam:     /64 (BP Location: Right arm, Patient Position: Sitting, Cuff Size: Standard)   Pulse 60   Temp 97.9 °F (36.6 °C) (Temporal)   Ht 5' 7" (1.702 m) Comment: on file  Wt 80.6 kg (177 lb 9.6 oz)   BMI 27.82 kg/m²     Physical Exam  Vitals and nursing note reviewed. Constitutional:       Appearance: Normal appearance. HENT:      Head: Normocephalic and atraumatic.       Right Ear: Hearing, tympanic membrane, ear canal and external ear normal.      Left Ear: Hearing, tympanic membrane and external ear normal. There is impacted cerumen. Ears:      Comments: Copious amount of sticky wax removed from L canal.   Eyes:      General: Lids are normal.      Conjunctiva/sclera: Conjunctivae normal.      Pupils: Pupils are equal, round, and reactive to light. Cardiovascular:      Rate and Rhythm: Normal rate and regular rhythm. Heart sounds: Normal heart sounds. Pulmonary:      Effort: Pulmonary effort is normal.      Breath sounds: Normal breath sounds. Skin:     General: Skin is warm and dry. Neurological:      General: No focal deficit present. Mental Status: He is alert. Mental status is at baseline. Psychiatric:         Mood and Affect: Mood normal.         Behavior: Behavior normal.         Thought Content:  Thought content normal.         Judgment: Judgment normal.          Kita Ortiz PA-C

## 2023-11-01 NOTE — ASSESSMENT & PLAN NOTE
Glucose only slightly elevated at 106 with an A1c of 5.9 continue to observe decrease simple carbs such as bread pasta potatoes rice junk for dessert and sweets.

## 2023-11-01 NOTE — ASSESSMENT & PLAN NOTE
Lab Results   Component Value Date    EGFR 61 10/27/2023    EGFR 63 06/13/2023    EGFR 57 04/06/2023    CREATININE 1.18 10/27/2023    CREATININE 1.15 06/13/2023    CREATININE 1.25 04/06/2023   GFR very stable at 61 with a normal BUN/creatinine. Continue increasing fluids and avoidance of regular anti-inflammatories.

## 2023-11-01 NOTE — PATIENT INSTRUCTIONS
Problem List Items Addressed This Visit          Endocrine    Acquired hypothyroidism     TSH within normal limits on levothyroxine 125. Cardiovascular and Mediastinum    Essential hypertension - Primary     Blood pressure very controlled on current medication. Genitourinary    Stage 3a chronic kidney disease [facility])     Lab Results   Component Value Date    EGFR 61 10/27/2023    EGFR 63 06/13/2023    EGFR 57 04/06/2023    CREATININE 1.18 10/27/2023    CREATININE 1.15 06/13/2023    CREATININE 1.25 04/06/2023   GFR very stable at 61 with a normal BUN/creatinine. Continue increasing fluids and avoidance of regular anti-inflammatories. Other    Major depressive disorder, recurrent, in full remission ([address])     Followed by psychiatry. Stable no SI or HI. Hyperglycemia     Glucose only slightly elevated at 106 with an A1c of 5.9 continue to observe decrease simple carbs such as bread pasta potatoes rice junk for dessert and sweets. Mixed hyperlipidemia     Patient not on any medications for cholesterol last checked was acceptable 6 months ago so we will repeat in 6 months which will make it yearly screening.

## 2023-11-02 ENCOUNTER — CLINICAL SUPPORT (OUTPATIENT)
Dept: FAMILY MEDICINE CLINIC | Facility: CLINIC | Age: 73
End: 2023-11-02
Payer: MEDICARE

## 2023-11-02 DIAGNOSIS — E53.8 B12 DEFICIENCY: Primary | ICD-10-CM

## 2023-11-02 PROCEDURE — 96372 THER/PROPH/DIAG INJ SC/IM: CPT

## 2023-11-04 DIAGNOSIS — I10 ESSENTIAL HYPERTENSION: ICD-10-CM

## 2023-11-06 RX ORDER — LOSARTAN POTASSIUM 50 MG/1
TABLET ORAL
Qty: 90 TABLET | Refills: 1 | Status: SHIPPED | OUTPATIENT
Start: 2023-11-06

## 2023-11-08 ENCOUNTER — HOSPITAL ENCOUNTER (OUTPATIENT)
Dept: NEUROLOGY | Facility: CLINIC | Age: 73
Discharge: HOME | End: 2023-11-08
Payer: MEDICARE

## 2023-11-08 DIAGNOSIS — G62.9 NEUROPATHY: ICD-10-CM

## 2023-11-08 PROBLEM — M54.17 LUMBOSACRAL RADICULOPATHY: Status: ACTIVE | Noted: 2023-11-08

## 2023-11-08 PROCEDURE — 95911 NRV CNDJ TEST 9-10 STUDIES: CPT | Performed by: PSYCHIATRY & NEUROLOGY

## 2023-11-08 PROCEDURE — 95886 MUSC TEST DONE W/N TEST COMP: CPT | Performed by: PSYCHIATRY & NEUROLOGY

## 2023-11-14 ENCOUNTER — CLINICAL SUPPORT (OUTPATIENT)
Dept: FAMILY MEDICINE CLINIC | Facility: CLINIC | Age: 73
End: 2023-11-14
Payer: MEDICARE

## 2023-11-14 DIAGNOSIS — E53.8 B12 DEFICIENCY: Primary | ICD-10-CM

## 2023-11-14 PROCEDURE — 96372 THER/PROPH/DIAG INJ SC/IM: CPT | Performed by: PHYSICIAN ASSISTANT

## 2023-11-14 NOTE — PROGRESS NOTES
Marci Ace is in the office today to receive b12 injection/vaccine. Pt handled the procedure well with no complaints and was able to leave the office in no distress.          Pt brought in his own B12 supply NDIC:[id number] WPU:[id number] EXP: 03/30/2025

## 2023-11-20 ENCOUNTER — APPOINTMENT (OUTPATIENT)
Dept: LAB | Facility: MEDICAL CENTER | Age: 73
End: 2023-11-20
Attending: UROLOGY
Payer: MEDICARE

## 2023-11-20 DIAGNOSIS — H53.2 DIPLOPIA: ICD-10-CM

## 2023-11-20 DIAGNOSIS — E53.8 B12 DEFICIENCY: ICD-10-CM

## 2023-11-20 DIAGNOSIS — Z51.81 MEDICATION MONITORING ENCOUNTER: ICD-10-CM

## 2023-11-20 DIAGNOSIS — C61 PROSTATE CANCER (HCC): ICD-10-CM

## 2023-11-20 LAB
PSA SERPL-MCNC: 4.27 NG/ML (ref 0–4)
VIT B12 SERPL-MCNC: 754 PG/ML (ref 180–914)

## 2023-11-20 PROCEDURE — 86255 FLUORESCENT ANTIBODY SCREEN: CPT

## 2023-11-20 PROCEDURE — 83519 RIA NONANTIBODY: CPT

## 2023-11-20 PROCEDURE — 80175 DRUG SCREEN QUAN LAMOTRIGINE: CPT

## 2023-11-20 PROCEDURE — 84153 ASSAY OF PSA TOTAL: CPT

## 2023-11-20 PROCEDURE — 82607 VITAMIN B-12: CPT

## 2023-11-20 PROCEDURE — 36415 COLL VENOUS BLD VENIPUNCTURE: CPT

## 2023-11-21 ENCOUNTER — OFFICE VISIT (OUTPATIENT)
Dept: FAMILY MEDICINE CLINIC | Facility: CLINIC | Age: 73
End: 2023-11-21
Payer: MEDICARE

## 2023-11-21 VITALS
WEIGHT: 178 LBS | BODY MASS INDEX: 27.94 KG/M2 | HEIGHT: 67 IN | SYSTOLIC BLOOD PRESSURE: 120 MMHG | OXYGEN SATURATION: 97 % | DIASTOLIC BLOOD PRESSURE: 58 MMHG | HEART RATE: 55 BPM

## 2023-11-21 DIAGNOSIS — E53.8 B12 DEFICIENCY: Primary | ICD-10-CM

## 2023-11-21 LAB — BACTERIA UR CULT: NORMAL

## 2023-11-21 PROCEDURE — 99213 OFFICE O/P EST LOW 20 MIN: CPT | Performed by: PHYSICIAN ASSISTANT

## 2023-11-21 NOTE — ASSESSMENT & PLAN NOTE
Ordered by neurology to have weekly injections x 6 doses. B12 level was done yesterday was in the 700s which is an increase from 200s and so per following neurology recommendations I would have him finish out his next 2 doses. He will get 1 today and then repeat with a nurse visit. He has to follow-up with neurology once all testing is completed.

## 2023-11-21 NOTE — PATIENT INSTRUCTIONS
Problem List Items Addressed This Visit          Other    B12 deficiency - Primary     Ordered by neurology to have weekly injections x 6 doses. B12 level was done yesterday was in the 700s which is an increase from 200s and so per following neurology recommendations I would have him finish out his next 2 doses. He will get 1 today and then repeat with a nurse visit. He has to follow-up with neurology once all testing is completed.

## 2023-11-21 NOTE — PROGRESS NOTES
Name: Khang Murillo      : 1950      MRN: [id number]  Encounter Provider: Renita Bosworth, PA-C  Encounter Date: 2023   Encounter department: [facility] PRIMARY [facility]    Assessment & Plan     1. B12 deficiency  Assessment & Plan:  Ordered by neurology to have weekly injections x 6 doses. B12 level was done yesterday was in the 700s which is an increase from 200s and so per following neurology recommendations I would have him finish out his next 2 doses. He will get 1 today and then repeat with a nurse visit. He has to follow-up with neurology once all testing is completed. Subjective      Patient presents with: Follow-up: Patient present today for his follow up on his B12 lab results. Patient did see neurology who decided to put him on B12 injections once weekly for 6 doses. He did get his blood work done yesterday which showed that his B12 level went from 258-700 since April and he has only had 4 doses so far. He is here today because he is not sure if he should finish the next 2 injections. He does have other blood work ordered by them that are pending and he already had an EMG. He is also taking the oral version and states that overall he does not feel any different today than he did today than he did before starting the B12 supplements. Review of Systems   Constitutional: Negative. HENT: Negative. Eyes: Negative. Respiratory: Negative. Cardiovascular: Negative. Gastrointestinal: Negative. Endocrine: Negative. Genitourinary: Negative. Musculoskeletal: Negative. Skin: Negative. Allergic/Immunologic: Negative. Neurological: Negative. Hematological: Negative. Psychiatric/Behavioral: Negative.          Current Outpatient Medications on File Prior to Visit   Medication Sig   • alfuzosin (UROXATRAL) 10 mg 24 hr tablet Take 1 tablet (10 mg total) by mouth daily   • ALPRAZolam (XANAX) 0.25 mg tablet daily as needed   • buPROPion (WELLBUTRIN SR) 150 mg 12 hr tablet Take 150 mg by mouth 2 (two) times a day   • escitalopram (LEXAPRO) 5 mg tablet Take 1 tablet by mouth daily   • fluticasone (FLONASE) 50 mcg/act nasal spray 1 spray into each nostril daily (Patient taking differently: 1 spray into each nostril if needed)   • lamoTRIgine (LaMICtal) 200 MG tablet Take 1 tablet by mouth 2 (two) times a day   • levothyroxine 125 mcg tablet TAKE 1 TABLET BY MOUTH EVERY DAY   • losartan (COZAAR) 50 mg tablet TAKE 1 TABLET BY MOUTH EVERY DAY   • traZODone (DESYREL) 50 mg tablet Take 50 mg by mouth     • cyanocobalamin 1,000 mcg/mL INJECT 1 ML (1,000 MCG TOTAL) INTO A MUSCLE EVERY 7 DAYS FOR 6 DOSES       Objective     /58 (BP Location: Left arm, Patient Position: Sitting, Cuff Size: Large)   Pulse 55   Ht 5' 7" (1.702 m)   Wt 80.7 kg (178 lb)   SpO2 97%   BMI 27.88 kg/m²     Physical Exam  Vitals and nursing note reviewed. Constitutional:       General: He is not in acute distress. Appearance: He is well-developed. He is not diaphoretic. HENT:      Head: Normocephalic and atraumatic. Eyes:      General:         Right eye: No discharge. Left eye: No discharge. Conjunctiva/sclera: Conjunctivae normal.   Neck:      Vascular: No carotid bruit. Cardiovascular:      Rate and Rhythm: Normal rate and regular rhythm. Heart sounds: Normal heart sounds. No murmur heard. No friction rub. No gallop. Pulmonary:      Effort: Pulmonary effort is normal. No respiratory distress. Breath sounds: Normal breath sounds. No wheezing or rales. Skin:     General: Skin is warm and dry. Neurological:      Mental Status: He is alert and oriented to person, place, and time.    Psychiatric:         Judgment: Judgment normal.       Guzman Rodriguez PA-C

## 2023-11-22 LAB
ACHR BIND AB SER-SCNC: <0.03 NMOL/L (ref 0–0.24)
LAMOTRIGINE SERPL-MCNC: 1.6 UG/ML (ref 2–20)

## 2023-11-28 LAB
ACHR BLOCK AB/ACHR TOTAL SFR SER: 10 % (ref 0–25)
ACHR MOD AB/ACHR TOTAL SFR SER: 0 % (ref 0–45)
MUSK AB SER IA-ACNC: <1 U/ML

## 2023-11-29 ENCOUNTER — CLINICAL SUPPORT (OUTPATIENT)
Dept: FAMILY MEDICINE CLINIC | Facility: CLINIC | Age: 73
End: 2023-11-29
Payer: MEDICARE

## 2023-11-29 DIAGNOSIS — E53.8 B12 DEFICIENCY: Primary | ICD-10-CM

## 2023-11-29 PROCEDURE — 96372 THER/PROPH/DIAG INJ SC/IM: CPT | Performed by: PHYSICIAN ASSISTANT

## 2023-12-04 ENCOUNTER — OFFICE VISIT (OUTPATIENT)
Dept: UROLOGY | Facility: MEDICAL CENTER | Age: 73
End: 2023-12-04
Payer: MEDICARE

## 2023-12-04 VITALS
DIASTOLIC BLOOD PRESSURE: 76 MMHG | BODY MASS INDEX: 28.25 KG/M2 | SYSTOLIC BLOOD PRESSURE: 120 MMHG | WEIGHT: 180 LBS | HEIGHT: 67 IN

## 2023-12-04 DIAGNOSIS — Z85.51 PERSONAL HISTORY OF BLADDER CANCER: ICD-10-CM

## 2023-12-04 DIAGNOSIS — C61 PROSTATE CANCER (HCC): Primary | ICD-10-CM

## 2023-12-04 PROCEDURE — 99214 OFFICE O/P EST MOD 30 MIN: CPT | Performed by: UROLOGY

## 2023-12-04 NOTE — ASSESSMENT & PLAN NOTE
The patient underwent TURBT approximately 20 years ago. He has never had a recurrence. Cystoscopy last year was unremarkable. Urinalysis last year was negative. Renal ultrasound in December 2022 was negative. Options were discussed. We will plan to repeat a urinalysis and urine cytology prior to his next visit. In all likelihood he does not need further cystoscopy. He will return in 6 months.

## 2023-12-04 NOTE — ASSESSMENT & PLAN NOTE
The patient remains content to pursue active surveillance. AUA symptom score is 6. He is satisfied with his voiding pattern. Options were discussed. We will plan to obtain a PSA and repeat a multiparametric MRI of the prostate in 6 months. We will then consider confirmatory prostate biopsies.

## 2023-12-04 NOTE — PROGRESS NOTES
Assessment/Plan:    Prostate cancer [facility])  The patient remains content to pursue active surveillance. AUA symptom score is 6. He is satisfied with his voiding pattern. Options were discussed. We will plan to obtain a PSA and repeat a multiparametric MRI of the prostate in 6 months. We will then consider confirmatory prostate biopsies. Personal history of bladder cancer  The patient underwent TURBT approximately 20 years ago. He has never had a recurrence. Cystoscopy last year was unremarkable. Urinalysis last year was negative. Renal ultrasound in December 2022 was negative. Options were discussed. We will plan to repeat a urinalysis and urine cytology prior to his next visit. In all likelihood he does not need further cystoscopy. He will return in 6 months. Diagnoses and all orders for this visit:    Prostate cancer ([address])  -     PSA Total, Diagnostic; Future  -     Urinalysis with microscopic; Future  -     MRI prostate multiparametric wo w contrast; Future    Personal history of bladder cancer  -     Urinalysis with microscopic; Future  -     Cytology, urine; Future          Subjective:      Patient ID: Vicenta Wolff is a 68 y.o. male. CC: prostate cancer- Active surveillance, history of bladder cancer    HPI: 79-year-old male followed for the above complaints. He was diagnosed 4 months ago with favorable intermediate risk prostate cancer. He elected active surveillance. He notes he is voiding adequately. He is satisfied with his voiding pattern. There is no gross hematuria or symptoms of infection. He denies irritative voiding symptoms. He has no flank pain. He notes he feels well and there are no clinical changes. He is due for colonoscopy in the near future.           The following portions of the patient's history were reviewed and updated as appropriate: allergies, current medications, past family history, past medical history, past social history, past surgical history, and problem list.    Review of Systems   Constitutional: Negative. Negative for chills, diaphoresis, fatigue and fever. HENT: Negative. Eyes: Negative. Respiratory: Negative. Cardiovascular: Negative. Gastrointestinal: Negative. Due for colonoscopy. Endocrine: Negative. Genitourinary:         See HPI   Musculoskeletal: Negative. Skin: Negative. Allergic/Immunologic: Negative. Neurological: Negative. Hematological: Negative. Psychiatric/Behavioral: Negative. All other systems reviewed and are negative. AUA SYMPTOM SCORE      Flowsheet Row Most Recent Value   AUA SYMPTOM SCORE    How often have you had a sensation of not emptying your bladder completely after you finished urinating? 0 (P)     How often have you had to urinate again less than two hours after you finished urinating? 1 (P)     How often have you found you stopped and started again several times when you urinate? 1 (P)     How often have you found it difficult to postpone urination? 0 (P)     How often have you had a weak urinary stream? 1 (P)     How often have you had to push or strain to begin urination? 1 (P)     How many times did you most typically get up to urinate from the time you went to bed at night until the time you got up in the morning? 2 (P)     Quality of Life: If you were to spend the rest of your life with your urinary condition just the way it is now, how would you feel about that? 2 (P)     AUA SYMPTOM SCORE 6 (P)            Objective:      /76   Ht 5' 7" (1.702 m)   Wt 81.6 kg (180 lb)   BMI 28.19 kg/m²          Physical Exam  Vitals reviewed. Constitutional:       General: He is not in acute distress. Appearance: Normal appearance. He is well-developed and normal weight. He is not ill-appearing, toxic-appearing or diaphoretic. HENT:      Head: Normocephalic and atraumatic. Eyes:      General: No scleral icterus.      Conjunctiva/sclera: Conjunctivae normal. Cardiovascular:      Rate and Rhythm: Normal rate. Pulmonary:      Effort: Pulmonary effort is normal.   Abdominal:      General: Bowel sounds are normal. There is no distension. Palpations: Abdomen is soft. There is no mass. Tenderness: There is no abdominal tenderness. There is no right CVA tenderness, left CVA tenderness, guarding or rebound. Hernia: No hernia is present. Genitourinary:     Penis: Normal. No phimosis or hypospadias. Testes: Normal.         Right: Mass not present. Left: Mass not present. Rectum: Normal.      Comments: Prostate moderately enlarged, firm smooth and free of any discrete area of induration. Musculoskeletal:         General: Normal range of motion. Cervical back: Neck supple. Skin:     General: Skin is warm and dry. Neurological:      General: No focal deficit present. Mental Status: He is alert and oriented to person, place, and time. Psychiatric:         Mood and Affect: Mood normal.         Behavior: Behavior normal.         Thought Content:  Thought content normal.         Judgment: Judgment normal.

## 2023-12-31 PROBLEM — H61.22 IMPACTED CERUMEN OF LEFT EAR: Status: RESOLVED | Noted: 2023-11-01 | Resolved: 2023-12-31

## 2024-01-16 ENCOUNTER — OFFICE VISIT (OUTPATIENT)
Dept: FAMILY MEDICINE CLINIC | Facility: CLINIC | Age: 74
End: 2024-01-16
Payer: MEDICARE

## 2024-01-16 VITALS
HEART RATE: 57 BPM | WEIGHT: 182 LBS | OXYGEN SATURATION: 96 % | SYSTOLIC BLOOD PRESSURE: 118 MMHG | BODY MASS INDEX: 28.56 KG/M2 | TEMPERATURE: 97.6 F | DIASTOLIC BLOOD PRESSURE: 60 MMHG | HEIGHT: 67 IN

## 2024-01-16 DIAGNOSIS — C61 PROSTATE CANCER (HCC): ICD-10-CM

## 2024-01-16 DIAGNOSIS — H10.33 ACUTE BACTERIAL CONJUNCTIVITIS OF BOTH EYES: Primary | ICD-10-CM

## 2024-01-16 DIAGNOSIS — N18.31 STAGE 3A CHRONIC KIDNEY DISEASE (HCC): ICD-10-CM

## 2024-01-16 DIAGNOSIS — E03.9 ACQUIRED HYPOTHYROIDISM: ICD-10-CM

## 2024-01-16 DIAGNOSIS — E23.7 PITUITARY LESION (HCC): ICD-10-CM

## 2024-01-16 DIAGNOSIS — F33.42 MAJOR DEPRESSIVE DISORDER, RECURRENT, IN FULL REMISSION (HCC): ICD-10-CM

## 2024-01-16 PROCEDURE — 99214 OFFICE O/P EST MOD 30 MIN: CPT | Performed by: PHYSICIAN ASSISTANT

## 2024-01-16 RX ORDER — SULFACETAMIDE SODIUM 100 MG/ML
2 SOLUTION/ DROPS OPHTHALMIC 3 TIMES DAILY
Qty: 10 ML | Refills: 0 | Status: SHIPPED | OUTPATIENT
Start: 2024-01-16 | End: 2024-01-23

## 2024-01-16 NOTE — PATIENT INSTRUCTIONS
Problem List Items Addressed This Visit          Endocrine    Acquired hypothyroidism     Patient states he has been out of his thyroid medication for at least 3 days however he does have refills until next summer.  Patient will get them from the pharmacy when picking up his eyedrops.         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            Genitourinary    Stage 3a chronic kidney disease ([facility])     Lab Results   Component Value Date    EGFR 61 10/27/2023    EGFR 63 06/13/2023    EGFR 57 04/06/2023    CREATININE 1.18 10/27/2023    CREATININE 1.15 06/13/2023    CREATININE 1.25 04/06/2023   Avoid anti-inflammatories.         Prostate cancer ([facility])     Follow with urology.            Other    Major depressive disorder, recurrent, in full remission ([facility])     Stable on current medication and seeing psychiatry.          Other Visit Diagnoses       Acute bacterial conjunctivitis of both eyes    -  Primary    Bactrim eyedrops as directed.  Good hygiene discussed to prevent wife from getting it.    Relevant Medications    sulfacetamide (BLEPH-10) 10 % ophthalmic solution

## 2024-01-16 NOTE — ASSESSMENT & PLAN NOTE
Patient states he has been out of his thyroid medication for at least 3 days however he does have refills until next summer.  Patient will get them from the pharmacy when picking up his eyedrops.

## 2024-01-16 NOTE — PROGRESS NOTES
Name: Tahir Rasheed      : 1950      MRN: [id number]  Encounter Provider: Carla Martinez PA-C  Encounter Date: 2024   Encounter department: [facility] PRIMARY CARE    Assessment & Plan     1. Acute bacterial conjunctivitis of both eyes  Comments:  Bactrim eyedrops as directed.  Good hygiene discussed to prevent wife from getting it.  Orders:  -     sulfacetamide (BLEPH-10) 10 % ophthalmic solution; Administer 2 drops to both eyes 3 (three) times a day for 7 days    2. Major depressive disorder, recurrent, in full remission ([facility])  Assessment & Plan:  Stable on current medication and seeing psychiatry.      3. 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      4. Prostate cancer ([facility])  Assessment & Plan:  Follow with urology.      5. Stage 3a chronic kidney disease ([facility])  Assessment & Plan:  Lab Results   Component Value Date    EGFR 61 10/27/2023    EGFR 63 2023    EGFR 57 2023    CREATININE 1.18 10/27/2023    CREATININE 1.15 2023    CREATININE 1.25 2023   Avoid anti-inflammatories.      6. Acquired hypothyroidism  Assessment & Plan:  Patient states he has been out of his thyroid medication for at least 3 days however he does have refills until next summer.  Patient will get them from the pharmacy when picking up his eyedrops.             Subjective      Yesterday patient started with draining weepy left eye it is now very red with thick mucus and the other eye is starting to feel not well as well.  No other symptoms.  Does not wear contacts.  Wife is concerned because she is having cataract surgery and she does not want to get pinkeye and is hoping for a prophylactic measure.      Review of Systems   Eyes:  Positive for discharge and redness.       Current Outpatient Medications on File Prior to Visit   Medication Sig   • alfuzosin (UROXATRAL) 10 mg 24 hr tablet Take 1 tablet (10 mg total) by mouth daily   • ALPRAZolam  "(XANAX) 0.25 mg tablet daily as needed   • buPROPion (WELLBUTRIN SR) 150 mg 12 hr tablet Take 150 mg by mouth 2 (two) times a day   • escitalopram (LEXAPRO) 5 mg tablet Take 1 tablet by mouth daily   • fluticasone (FLONASE) 50 mcg/act nasal spray 1 spray into each nostril daily (Patient taking differently: 1 spray into each nostril if needed)   • lamoTRIgine (LaMICtal) 200 MG tablet Take 1 tablet by mouth 2 (two) times a day   • levothyroxine 125 mcg tablet TAKE 1 TABLET BY MOUTH EVERY DAY   • losartan (COZAAR) 50 mg tablet TAKE 1 TABLET BY MOUTH EVERY DAY   • traZODone (DESYREL) 50 mg tablet Take 50 mg by mouth     • cyanocobalamin 1,000 mcg/mL INJECT 1 ML (1,000 MCG TOTAL) INTO A MUSCLE EVERY 7 DAYS FOR 6 DOSES (Patient not taking: Reported on 12/4/2023)       Objective     /60 (BP Location: Right arm, Patient Position: Sitting, Cuff Size: Large)   Pulse 57   Temp 97.6 °F (36.4 °C) (Tympanic)   Ht 5' 7\" (1.702 m)   Wt 82.6 kg (182 lb)   SpO2 96%   BMI 28.51 kg/m²     Physical Exam  Vitals and nursing note reviewed.   Constitutional:       General: He is not in acute distress.     Appearance: He is well-developed. He is not diaphoretic.   HENT:      Head: Normocephalic and atraumatic.   Eyes:      General:         Right eye: Discharge present.         Left eye: Discharge present.     Conjunctiva/sclera:      Right eye: Right conjunctiva is injected. Exudate present.      Left eye: Left conjunctiva is injected. Exudate present.   Neck:      Vascular: No carotid bruit.   Cardiovascular:      Rate and Rhythm: Normal rate and regular rhythm.      Heart sounds: Normal heart sounds. No murmur heard.     No friction rub. No gallop.   Pulmonary:      Effort: Pulmonary effort is normal. No respiratory distress.      Breath sounds: Normal breath sounds. No wheezing or rales.   Skin:     General: Skin is warm and dry.   Neurological:      Mental Status: He is alert and oriented to person, place, and time. "   Psychiatric:         Judgment: Judgment normal.       Carla Martinez PA-C

## 2024-01-16 NOTE — ASSESSMENT & PLAN NOTE
Lab Results   Component Value Date    EGFR 61 10/27/2023    EGFR 63 06/13/2023    EGFR 57 04/06/2023    CREATININE 1.18 10/27/2023    CREATININE 1.15 06/13/2023    CREATININE 1.25 04/06/2023   Avoid anti-inflammatories.

## 2024-01-22 DIAGNOSIS — N40.1 BENIGN PROSTATIC HYPERPLASIA WITH URINARY OBSTRUCTION: ICD-10-CM

## 2024-01-22 DIAGNOSIS — N13.8 BENIGN PROSTATIC HYPERPLASIA WITH URINARY OBSTRUCTION: ICD-10-CM

## 2024-01-22 RX ORDER — ALFUZOSIN HYDROCHLORIDE 10 MG/1
10 TABLET, EXTENDED RELEASE ORAL DAILY
Qty: 90 TABLET | Refills: 1 | Status: SHIPPED | OUTPATIENT
Start: 2024-01-22

## 2024-02-21 PROBLEM — Z00.00 MEDICARE ANNUAL WELLNESS VISIT, SUBSEQUENT: Status: RESOLVED | Noted: 2019-09-19 | Resolved: 2024-02-21

## 2024-03-20 DIAGNOSIS — I10 ESSENTIAL HYPERTENSION: ICD-10-CM

## 2024-03-20 RX ORDER — LOSARTAN POTASSIUM 50 MG/1
50 TABLET ORAL DAILY
Qty: 90 TABLET | Refills: 1 | Status: SHIPPED | OUTPATIENT
Start: 2024-03-20

## 2024-04-08 DIAGNOSIS — N13.8 BENIGN PROSTATIC HYPERPLASIA WITH URINARY OBSTRUCTION: ICD-10-CM

## 2024-04-08 DIAGNOSIS — N40.1 BENIGN PROSTATIC HYPERPLASIA WITH URINARY OBSTRUCTION: ICD-10-CM

## 2024-04-08 RX ORDER — ALFUZOSIN HYDROCHLORIDE 10 MG/1
10 TABLET, EXTENDED RELEASE ORAL DAILY
Qty: 90 TABLET | Refills: 1 | Status: SHIPPED | OUTPATIENT
Start: 2024-04-08

## 2024-05-08 ENCOUNTER — OFFICE VISIT (OUTPATIENT)
Dept: FAMILY MEDICINE CLINIC | Facility: CLINIC | Age: 74
End: 2024-05-08
Payer: MEDICARE

## 2024-05-08 VITALS
WEIGHT: 181 LBS | HEART RATE: 64 BPM | SYSTOLIC BLOOD PRESSURE: 136 MMHG | DIASTOLIC BLOOD PRESSURE: 80 MMHG | BODY MASS INDEX: 28.41 KG/M2 | HEIGHT: 67 IN

## 2024-05-08 DIAGNOSIS — I10 ESSENTIAL HYPERTENSION: ICD-10-CM

## 2024-05-08 DIAGNOSIS — F33.42 MAJOR DEPRESSIVE DISORDER, RECURRENT, IN FULL REMISSION (HCC): ICD-10-CM

## 2024-05-08 DIAGNOSIS — R73.9 HYPERGLYCEMIA: ICD-10-CM

## 2024-05-08 DIAGNOSIS — E03.9 ACQUIRED HYPOTHYROIDISM: Primary | ICD-10-CM

## 2024-05-08 DIAGNOSIS — E78.2 MIXED HYPERLIPIDEMIA: ICD-10-CM

## 2024-05-08 DIAGNOSIS — Z12.11 SCREENING FOR COLON CANCER: ICD-10-CM

## 2024-05-08 DIAGNOSIS — R41.3 IMPAIRED MEMORY: ICD-10-CM

## 2024-05-08 DIAGNOSIS — N18.31 STAGE 3A CHRONIC KIDNEY DISEASE (HCC): ICD-10-CM

## 2024-05-08 PROCEDURE — 99214 OFFICE O/P EST MOD 30 MIN: CPT | Performed by: PHYSICIAN ASSISTANT

## 2024-05-08 PROCEDURE — G2211 COMPLEX E/M VISIT ADD ON: HCPCS | Performed by: PHYSICIAN ASSISTANT

## 2024-05-08 NOTE — PATIENT INSTRUCTIONS
1. Acquired hypothyroidism  Assessment & Plan:  Patient on levothyroxine 125 mcg due for TSH order reprinted call with results      2. Essential hypertension  Assessment & Plan:  Top normal today on Cozaar 50.      3. Stage 3a chronic kidney disease (HCC)  Assessment & Plan:  Lab Results   Component Value Date    EGFR 61 10/27/2023    EGFR 63 06/13/2023    EGFR 57 04/06/2023    CREATININE 1.18 10/27/2023    CREATININE 1.15 06/13/2023    CREATININE 1.25 04/06/2023   Due for CMP order reprinted.      4. Major depressive disorder, recurrent, in full remission (HCC)  Assessment & Plan:  Following with psychiatry independently.  No SI or HI today.      5. Hyperglycemia  Assessment & Plan:  Due for fasting CMP order reprinted call with results.      6. Mixed hyperlipidemia  Assessment & Plan:  Due for fasting lipid panel order reprinted call with results.      7. Impaired memory  Assessment & Plan:  I personally have noticed the patient's memory declining over the past year.  Today patient did not even realize why he was here and that he needed to have blood work done so that we could review it at today's appointment all discussed and ordered 6 months ago at his last appointment with me.  Slums test was technically normal at 27.      8. Screening for colon cancer  -     Ambulatory Referral to Gastroenterology; Future

## 2024-05-08 NOTE — PROGRESS NOTES
Name: Tahir Rasheed      : 1950      MRN: [id number]  Encounter Provider: Carla Martinez PA-C  Encounter Date: 2024   Encounter department: [facility] PRIMARY CARE    Assessment & Plan     1. Acquired hypothyroidism  Assessment & Plan:  Patient on levothyroxine 125 mcg due for TSH order reprinted call with results      2. Essential hypertension  Assessment & Plan:  Top normal today on Cozaar 50.      3. Stage 3a chronic kidney disease (HCC)  Assessment & Plan:  Lab Results   Component Value Date    EGFR 61 10/27/2023    EGFR 63 2023    EGFR 57 2023    CREATININE 1.18 10/27/2023    CREATININE 1.15 2023    CREATININE 1.25 2023   Due for CMP order reprinted.      4. Major depressive disorder, recurrent, in full remission (HCC)  Assessment & Plan:  Following with psychiatry independently.  No SI or HI today.      5. Hyperglycemia  Assessment & Plan:  Due for fasting CMP order reprinted call with results.      6. Mixed hyperlipidemia  Assessment & Plan:  Due for fasting lipid panel order reprinted call with results.      7. Impaired memory  Assessment & Plan:  I personally have noticed the patient's memory declining over the past year.  Today patient did not even realize why he was here and that he needed to have blood work done so that we could review it at today's appointment all discussed and ordered 6 months ago at his last appointment with me.  Slums test was technically normal at 27.      8. Screening for colon cancer  -     Ambulatory Referral to Gastroenterology; Future           Subjective     HPI         Review of Systems    Current Outpatient Medications on File Prior to Visit   Medication Sig   • alfuzosin (UROXATRAL) 10 mg 24 hr tablet Take 1 tablet (10 mg total) by mouth daily   • ALPRAZolam (XANAX) 0.25 mg tablet daily as needed   • buPROPion (WELLBUTRIN SR) 150 mg 12 hr tablet Take 150 mg by mouth 2 (two) times a day   • escitalopram (LEXAPRO) 5 mg tablet  "Take 1 tablet by mouth daily   • fluticasone (FLONASE) 50 mcg/act nasal spray 1 spray into each nostril daily (Patient taking differently: 1 spray into each nostril if needed)   • lamoTRIgine (LaMICtal) 200 MG tablet Take 1 tablet by mouth 2 (two) times a day   • levothyroxine 125 mcg tablet TAKE 1 TABLET BY MOUTH EVERY DAY   • losartan (COZAAR) 50 mg tablet Take 1 tablet (50 mg total) by mouth daily   • traZODone (DESYREL) 50 mg tablet Take 50 mg by mouth     • [DISCONTINUED] cyanocobalamin 1,000 mcg/mL INJECT 1 ML (1,000 MCG TOTAL) INTO A MUSCLE EVERY 7 DAYS FOR 6 DOSES (Patient not taking: Reported on 12/4/2023)       Objective     /80   Pulse 64   Ht 5' 7\" (1.702 m)   Wt 82.1 kg (181 lb)   BMI 28.35 kg/m²     Physical Exam  Carla Martinez PA-C      "

## 2024-05-08 NOTE — ASSESSMENT & PLAN NOTE
I personally have noticed the patient's memory declining over the past year.  Today patient did not even realize why he was here and that he needed to have blood work done so that we could review it at today's appointment all discussed and ordered 6 months ago at his last appointment with me.  Slums test was technically normal at 27.

## 2024-05-08 NOTE — ASSESSMENT & PLAN NOTE
Lab Results   Component Value Date    EGFR 61 10/27/2023    EGFR 63 06/13/2023    EGFR 57 04/06/2023    CREATININE 1.18 10/27/2023    CREATININE 1.15 06/13/2023    CREATININE 1.25 04/06/2023   Due for CMP order reprinted.

## 2024-05-08 NOTE — PROGRESS NOTES
Name: Tahir Rasheed      : 1950      MRN: [id number]  Encounter Provider: Carla Martinez PA-C  Encounter Date: 2024   Encounter department: [facility] PRIMARY CARE    Assessment & Plan     1. Acquired hypothyroidism  Assessment & Plan:  Patient on levothyroxine 125 mcg due for TSH order reprinted call with results      2. Essential hypertension  Assessment & Plan:  Top normal today on Cozaar 50.      3. Stage 3a chronic kidney disease (HCC)  Assessment & Plan:  Lab Results   Component Value Date    EGFR 61 10/27/2023    EGFR 63 2023    EGFR 57 2023    CREATININE 1.18 10/27/2023    CREATININE 1.15 2023    CREATININE 1.25 2023   Due for CMP order reprinted.      4. Major depressive disorder, recurrent, in full remission (HCC)  Assessment & Plan:  Following with psychiatry independently.  No SI or HI today.      5. Hyperglycemia  Assessment & Plan:  Due for fasting CMP order reprinted call with results.      6. Mixed hyperlipidemia  Assessment & Plan:  Due for fasting lipid panel order reprinted call with results.      7. Impaired memory  Assessment & Plan:  I personally have noticed the patient's memory declining over the past year.  Today patient did not even realize why he was here and that he needed to have blood work done so that we could review it at today's appointment all discussed and ordered 6 months ago at his last appointment with me.  Slums test was technically normal at 27.      8. Screening for colon cancer  -     Ambulatory Referral to Gastroenterology; Future        Depression Screening and Follow-up Plan: Patient's depression screening was positive with a PHQ-9 score of 7. Continue regular follow-up with their mental health provider who is managing their mental health condition(s).         Subjective        Tahir Rasheed is here for chronic conditions f/u.   Patient presents with:  Follow-up: Pt states he has no idea what he is here for, pt offers no  "complaints at this time . Pt agreeable to colonoscopy   Depression: + screening smart set added to note   Memory Loss    PT was seen fall 2023 and labs reviewed and ordered for 6 months. Pt made apt when checking out. Does not remember why he is here today. States has a strong family history of Alzheimer's.             Review of Systems   Constitutional: Negative.    HENT: Negative.     Eyes: Negative.    Respiratory: Negative.     Cardiovascular: Negative.    Gastrointestinal: Negative.    Endocrine: Negative.    Genitourinary: Negative.    Musculoskeletal: Negative.    Skin: Negative.    Allergic/Immunologic: Negative.    Neurological: Negative.    Hematological: Negative.    Psychiatric/Behavioral: Negative.         Current Outpatient Medications on File Prior to Visit   Medication Sig   • alfuzosin (UROXATRAL) 10 mg 24 hr tablet Take 1 tablet (10 mg total) by mouth daily   • ALPRAZolam (XANAX) 0.25 mg tablet daily as needed   • buPROPion (WELLBUTRIN SR) 150 mg 12 hr tablet Take 150 mg by mouth 2 (two) times a day   • escitalopram (LEXAPRO) 5 mg tablet Take 1 tablet by mouth daily   • fluticasone (FLONASE) 50 mcg/act nasal spray 1 spray into each nostril daily (Patient taking differently: 1 spray into each nostril if needed)   • lamoTRIgine (LaMICtal) 200 MG tablet Take 1 tablet by mouth 2 (two) times a day   • levothyroxine 125 mcg tablet TAKE 1 TABLET BY MOUTH EVERY DAY   • losartan (COZAAR) 50 mg tablet Take 1 tablet (50 mg total) by mouth daily   • traZODone (DESYREL) 50 mg tablet Take 50 mg by mouth     • [DISCONTINUED] cyanocobalamin 1,000 mcg/mL INJECT 1 ML (1,000 MCG TOTAL) INTO A MUSCLE EVERY 7 DAYS FOR 6 DOSES (Patient not taking: Reported on 12/4/2023)       Objective     /80   Pulse 64   Ht 5' 7\" (1.702 m)   Wt 82.1 kg (181 lb)   BMI 28.35 kg/m²   Depression Screening Follow-up Plan: Patient's depression screening was positive with a PHQ-2 score of . Their PHQ-9 score was 7. Continue regular " follow-up with their psychologist/therapist/psychiatrist who is managing their mental health condition(s).  Physical Exam  Vitals and nursing note reviewed.   Constitutional:       General: He is not in acute distress.     Appearance: He is well-developed. He is not diaphoretic.   HENT:      Head: Normocephalic and atraumatic.   Eyes:      General:         Right eye: No discharge.         Left eye: No discharge.      Conjunctiva/sclera: Conjunctivae normal.   Neck:      Vascular: No carotid bruit.   Cardiovascular:      Rate and Rhythm: Normal rate and regular rhythm.      Heart sounds: Normal heart sounds. No murmur heard.     No friction rub. No gallop.   Pulmonary:      Effort: Pulmonary effort is normal. No respiratory distress.      Breath sounds: Normal breath sounds. No wheezing or rales.   Skin:     General: Skin is warm and dry.   Neurological:      Mental Status: He is alert and oriented to person, place, and time.   Psychiatric:         Judgment: Judgment normal.     [facility] Mental Status Exam ([facility])    Tahir Rasheed     73 y.o.    Education level:  High School?    yes     Less than High School?   no  For the questions below, yes is correct response, no is incorrect.    1: What day of the week is it?  yes    1  2: What year is it?   yes    1  3: What state are we in?  yes    1    Total points: 3 / 3.    Please remember these 5 objects. I will ask about them later.   Apple Table Kathryn    Tie House    5: You have $100 and you go to the store and buy a dozen apples for $3, and a tricycle for $20.   How much did you spend?   23    1   How much do you have left? 77    2    Total points: 3 / 3.    6: Please name as many animals as you can in 1 minute   Number named:  20   0: 0-4 1: 5-9 2: 10-14   3: 15+    Total points: 3 / 3.    7: What were the five objects I asked you to remember?    Number remembered: 3    1 point for each correct object remembered    8: I am going to give you a series of numbers.  I want you to give them to me backwards,    Example: If I say 42, you say 24.   0: 87  1: 649  1: 8537 Points: 2    9:  This is a clock face. Please put hour markers and the time at ten minutes to eleven o'clock.       2: Hour markers okay       2: Time correct.    Total points: 4 / 4.    10: Place an X in the triangle.  (Square Triangle Rectangle)    1  Which figure is largest?      1    Total points: 2 / 2.    11: I am going to tell you a story. Please listen carefully because afterwards I'm going to ask you some questions about it.   Rachel was a very succesfull . 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 She then met Emory., a devistatingly handsome man. She  him  and had three children. They live in [place]. When they were teenagers, she             went back to work. She and Emory lived happily ever after.     2: What was the female's name? 2: What work did she do?   2: When did she go back to work? 2: What state did she live in?    Total points: 8 / 8.    Final Total:  27 / 30        Scoring:   High school education    Less than high school education  27-30    Normal   20-30  20-27    MCI   14-19  1-19    Dementia  1-14     Carla Martinez PA-C

## 2024-05-09 ENCOUNTER — LAB (OUTPATIENT)
Dept: LAB | Facility: MEDICAL CENTER | Age: 74
End: 2024-05-09
Payer: MEDICARE

## 2024-05-09 DIAGNOSIS — Z85.51 PERSONAL HISTORY OF BLADDER CANCER: ICD-10-CM

## 2024-05-09 DIAGNOSIS — E78.2 MIXED HYPERLIPIDEMIA: ICD-10-CM

## 2024-05-09 DIAGNOSIS — R73.9 HYPERGLYCEMIA: ICD-10-CM

## 2024-05-09 DIAGNOSIS — E03.9 ACQUIRED HYPOTHYROIDISM: ICD-10-CM

## 2024-05-09 DIAGNOSIS — C61 PROSTATE CANCER (HCC): ICD-10-CM

## 2024-05-09 DIAGNOSIS — N18.31 STAGE 3A CHRONIC KIDNEY DISEASE (HCC): ICD-10-CM

## 2024-05-09 LAB
ALBUMIN SERPL BCP-MCNC: 4.4 G/DL (ref 3.5–5)
ALP SERPL-CCNC: 64 U/L (ref 34–104)
ALT SERPL W P-5'-P-CCNC: 15 U/L (ref 7–52)
ANION GAP SERPL CALCULATED.3IONS-SCNC: 8 MMOL/L (ref 4–13)
AST SERPL W P-5'-P-CCNC: 24 U/L (ref 13–39)
BACTERIA UR QL AUTO: NORMAL /HPF
BASOPHILS # BLD AUTO: 0.05 THOUSANDS/ÂΜL (ref 0–0.1)
BASOPHILS NFR BLD AUTO: 1 % (ref 0–1)
BILIRUB SERPL-MCNC: 0.54 MG/DL (ref 0.2–1)
BILIRUB UR QL STRIP: NEGATIVE
BUN SERPL-MCNC: 16 MG/DL (ref 5–25)
CALCIUM SERPL-MCNC: 9.3 MG/DL (ref 8.4–10.2)
CHLORIDE SERPL-SCNC: 100 MMOL/L (ref 96–108)
CHOLEST SERPL-MCNC: 184 MG/DL
CLARITY UR: CLEAR
CO2 SERPL-SCNC: 30 MMOL/L (ref 21–32)
COLOR UR: NORMAL
CREAT SERPL-MCNC: 1.02 MG/DL (ref 0.6–1.3)
EOSINOPHIL # BLD AUTO: 0.12 THOUSAND/ÂΜL (ref 0–0.61)
EOSINOPHIL NFR BLD AUTO: 2 % (ref 0–6)
ERYTHROCYTE [DISTWIDTH] IN BLOOD BY AUTOMATED COUNT: 13 % (ref 11.6–15.1)
EST. AVERAGE GLUCOSE BLD GHB EST-MCNC: 126 MG/DL
GFR SERPL CREATININE-BSD FRML MDRD: 72 ML/MIN/1.73SQ M
GLUCOSE P FAST SERPL-MCNC: 104 MG/DL (ref 65–99)
GLUCOSE UR STRIP-MCNC: NEGATIVE MG/DL
HBA1C MFR BLD: 6 %
HCT VFR BLD AUTO: 46.6 % (ref 36.5–49.3)
HDLC SERPL-MCNC: 55 MG/DL
HGB BLD-MCNC: 14.7 G/DL (ref 12–17)
HGB UR QL STRIP.AUTO: NEGATIVE
IMM GRANULOCYTES # BLD AUTO: 0.03 THOUSAND/UL (ref 0–0.2)
IMM GRANULOCYTES NFR BLD AUTO: 0 % (ref 0–2)
KETONES UR STRIP-MCNC: NEGATIVE MG/DL
LDLC SERPL CALC-MCNC: 112 MG/DL (ref 0–100)
LEUKOCYTE ESTERASE UR QL STRIP: NEGATIVE
LYMPHOCYTES # BLD AUTO: 1.98 THOUSANDS/ÂΜL (ref 0.6–4.47)
LYMPHOCYTES NFR BLD AUTO: 27 % (ref 14–44)
MCH RBC QN AUTO: 29.2 PG (ref 26.8–34.3)
MCHC RBC AUTO-ENTMCNC: 31.5 G/DL (ref 31.4–37.4)
MCV RBC AUTO: 93 FL (ref 82–98)
MONOCYTES # BLD AUTO: 0.57 THOUSAND/ÂΜL (ref 0.17–1.22)
MONOCYTES NFR BLD AUTO: 8 % (ref 4–12)
NEUTROPHILS # BLD AUTO: 4.6 THOUSANDS/ÂΜL (ref 1.85–7.62)
NEUTS SEG NFR BLD AUTO: 62 % (ref 43–75)
NITRITE UR QL STRIP: NEGATIVE
NON-SQ EPI CELLS URNS QL MICRO: NORMAL /HPF
NRBC BLD AUTO-RTO: 0 /100 WBCS
PH UR STRIP.AUTO: 6.5 [PH]
PLATELET # BLD AUTO: 292 THOUSANDS/UL (ref 149–390)
PMV BLD AUTO: 10.2 FL (ref 8.9–12.7)
POTASSIUM SERPL-SCNC: 4.3 MMOL/L (ref 3.5–5.3)
PROT SERPL-MCNC: 7.6 G/DL (ref 6.4–8.4)
PROT UR STRIP-MCNC: NEGATIVE MG/DL
PSA SERPL-MCNC: 4.78 NG/ML (ref 0–4)
RBC # BLD AUTO: 5.03 MILLION/UL (ref 3.88–5.62)
RBC #/AREA URNS AUTO: NORMAL /HPF
SODIUM SERPL-SCNC: 138 MMOL/L (ref 135–147)
SP GR UR STRIP.AUTO: 1.01 (ref 1–1.03)
TRIGL SERPL-MCNC: 86 MG/DL
TSH SERPL DL<=0.05 MIU/L-ACNC: 2.07 UIU/ML (ref 0.45–4.5)
UROBILINOGEN UR STRIP-ACNC: <2 MG/DL
WBC # BLD AUTO: 7.35 THOUSAND/UL (ref 4.31–10.16)
WBC #/AREA URNS AUTO: NORMAL /HPF

## 2024-05-09 PROCEDURE — 88112 CYTOPATH CELL ENHANCE TECH: CPT | Performed by: STUDENT IN AN ORGANIZED HEALTH CARE EDUCATION/TRAINING PROGRAM

## 2024-05-09 PROCEDURE — 36415 COLL VENOUS BLD VENIPUNCTURE: CPT

## 2024-05-09 PROCEDURE — 84153 ASSAY OF PSA TOTAL: CPT

## 2024-05-09 PROCEDURE — 81001 URINALYSIS AUTO W/SCOPE: CPT

## 2024-05-09 PROCEDURE — 80061 LIPID PANEL: CPT

## 2024-05-09 PROCEDURE — 83036 HEMOGLOBIN GLYCOSYLATED A1C: CPT

## 2024-05-09 PROCEDURE — 84443 ASSAY THYROID STIM HORMONE: CPT

## 2024-05-09 PROCEDURE — 85025 COMPLETE CBC W/AUTO DIFF WBC: CPT

## 2024-05-09 PROCEDURE — 80053 COMPREHEN METABOLIC PANEL: CPT

## 2024-05-10 DIAGNOSIS — R73.9 HYPERGLYCEMIA: ICD-10-CM

## 2024-05-10 DIAGNOSIS — N18.31 STAGE 3A CHRONIC KIDNEY DISEASE (HCC): Primary | ICD-10-CM

## 2024-05-10 DIAGNOSIS — I10 ESSENTIAL HYPERTENSION: ICD-10-CM

## 2024-05-10 DIAGNOSIS — E78.2 MIXED HYPERLIPIDEMIA: ICD-10-CM

## 2024-05-10 DIAGNOSIS — E03.9 ACQUIRED HYPOTHYROIDISM: ICD-10-CM

## 2024-05-10 NOTE — RESULT ENCOUNTER NOTE
Please let the patient know that his blood work was excellent.  No change in any medications and I would repeat in 1 year.

## 2024-05-13 ENCOUNTER — TELEPHONE (OUTPATIENT)
Dept: FAMILY MEDICINE CLINIC | Facility: CLINIC | Age: 74
End: 2024-05-13

## 2024-05-13 NOTE — TELEPHONE ENCOUNTER
----- Message from Carla Martinez PA-C sent at 5/10/2024  3:58 PM EDT -----  Please let the patient know that his blood work was excellent.  No change in any medications and I would repeat in 1 year.

## 2024-05-13 NOTE — TELEPHONE ENCOUNTER
Called patient and LMOM with results.    Please let the patient know that his blood work was excellent.  No change in any medications and I would repeat in 1 year.

## 2024-05-14 PROCEDURE — 88112 CYTOPATH CELL ENHANCE TECH: CPT | Performed by: STUDENT IN AN ORGANIZED HEALTH CARE EDUCATION/TRAINING PROGRAM

## 2024-05-15 ENCOUNTER — HOSPITAL ENCOUNTER (OUTPATIENT)
Facility: MEDICAL CENTER | Age: 74
Discharge: HOME | End: 2024-05-15
Attending: UROLOGY
Payer: MEDICARE

## 2024-05-15 DIAGNOSIS — C61 PROSTATE CANCER (HCC): ICD-10-CM

## 2024-05-15 PROCEDURE — 72197 MRI PELVIS W/O & W/DYE: CPT

## 2024-05-15 PROCEDURE — 76377 3D RENDER W/INTRP POSTPROCES: CPT

## 2024-05-15 PROCEDURE — A9585 GADOBUTROL INJECTION: HCPCS | Performed by: UROLOGY

## 2024-05-15 PROCEDURE — G1004 CDSM NDSC: HCPCS

## 2024-05-15 RX ORDER — GADOBUTROL 604.72 MG/ML
8 INJECTION INTRAVENOUS
Status: COMPLETED | OUTPATIENT
Start: 2024-05-15 | End: 2024-05-15

## 2024-05-15 RX ADMIN — GADOBUTROL 8 ML: 604.72 INJECTION INTRAVENOUS at 11:49

## 2024-06-11 ENCOUNTER — OFFICE VISIT (OUTPATIENT)
Dept: UROLOGY | Facility: MEDICAL CENTER | Age: 74
End: 2024-06-11
Payer: MEDICARE

## 2024-06-11 VITALS
HEIGHT: 67 IN | DIASTOLIC BLOOD PRESSURE: 66 MMHG | BODY MASS INDEX: 27.94 KG/M2 | WEIGHT: 178 LBS | HEART RATE: 68 BPM | SYSTOLIC BLOOD PRESSURE: 110 MMHG | OXYGEN SATURATION: 97 %

## 2024-06-11 DIAGNOSIS — N40.1 BENIGN PROSTATIC HYPERPLASIA WITH NOCTURIA: ICD-10-CM

## 2024-06-11 DIAGNOSIS — R35.1 BENIGN PROSTATIC HYPERPLASIA WITH NOCTURIA: ICD-10-CM

## 2024-06-11 DIAGNOSIS — C61 PROSTATE CANCER (HCC): Primary | ICD-10-CM

## 2024-06-11 DIAGNOSIS — Z85.51 PERSONAL HISTORY OF BLADDER CANCER: ICD-10-CM

## 2024-06-11 PROCEDURE — 99214 OFFICE O/P EST MOD 30 MIN: CPT | Performed by: UROLOGY

## 2024-06-11 RX ORDER — B-COMPLEX WITH VITAMIN C
250 TABLET ORAL DAILY
COMMUNITY

## 2024-06-11 RX ORDER — TAMSULOSIN HYDROCHLORIDE 0.4 MG/1
0.4 CAPSULE ORAL
Qty: 90 CAPSULE | Refills: 3 | Status: SHIPPED | OUTPATIENT
Start: 2024-06-11

## 2024-06-11 NOTE — ASSESSMENT & PLAN NOTE
Symptom score is 17 on Uroxatrol.  I did recommend a trial of tamsulosin as an alternative.  We will proceed with confirmatory biopsies and consider further therapy depending on the results of those biopsies.

## 2024-06-11 NOTE — ASSESSMENT & PLAN NOTE
PSA remains relatively stable at 4.78 (May 9, 2024).  Multiparametric MRI is unchanged from last year revealing a PI-RADS 4 lesion and a 46 g prostate.  Options were discussed and I did recommend proceeding with confirmatory biopsies at this time.

## 2024-06-11 NOTE — PROGRESS NOTES
Ambulatory Visit  Name: Tahir Rasheed      : 1950      MRN: [id number]  Encounter Provider: Kirill Hutchinson MD  Encounter Date: 2024   Encounter department: [facility] UROLOGY [place]    Assessment & Plan   1. Prostate cancer (HCC)  Assessment & Plan:  PSA remains relatively stable at 4.78 (May 9, 2024).  Multiparametric MRI is unchanged from last year revealing a PI-RADS 4 lesion and a 46 g prostate.  Options were discussed and I did recommend proceeding with confirmatory biopsies at this time.  Orders:  -     Case request operating room: TRANSPERINEAL MRI FUSION BIOPSY PROSTATE; Standing  -     EKG 12 lead; Future  -     Case request operating room: TRANSPERINEAL MRI FUSION BIOPSY PROSTATE  2. Benign prostatic hyperplasia with nocturia  Assessment & Plan:  Symptom score is 17 on Uroxatrol.  I did recommend a trial of tamsulosin as an alternative.  We will proceed with confirmatory biopsies and consider further therapy depending on the results of those biopsies.  Orders:  -     tamsulosin (FLOMAX) 0.4 mg; Take 1 capsule (0.4 mg total) by mouth daily with dinner  3. Personal history of bladder cancer  Assessment & Plan:  It has been more than 20 years since he was treated for bladder cancer.  There has not been a recurrence.  Urinalysis and urine cytology are negative.  We will defer cystoscopy at this time.      History of Present Illness     Tahir Rasheed is a 73 y.o. male who returns the office for follow-up.  He is followed on active surveillance for favorable risk intermediate grade prostate cancer.  He is on Uroxatrol for his lower urinary tract symptoms.  He notes his symptoms have exacerbated since his last visit.  He is getting up more frequently at night.  There is no gross hematuria or symptoms of infection.  He has no new back or bone pain.  There is no flank pain.    Review of Systems   Constitutional:  Negative for chills, diaphoresis, fatigue and fever.   HENT:  "Negative.     Eyes: Negative.    Respiratory: Negative.     Cardiovascular: Negative.    Endocrine: Negative.    Genitourinary:         See HPI   Musculoskeletal: Negative.    Skin: Negative.    Allergic/Immunologic: Negative.    Neurological: Negative.    Hematological: Negative.    Psychiatric/Behavioral: Negative.         AUA SYMPTOM SCORE      Flowsheet Row Most Recent Value   AUA SYMPTOM SCORE    How often have you had a sensation of not emptying your bladder completely after you finished urinating? 4 (P)     How often have you had to urinate again less than two hours after you finished urinating? 2 (P)     How often have you found you stopped and started again several times when you urinate? 3 (P)     How often have you found it difficult to postpone urination? 1 (P)     How often have you had a weak urinary stream? 2 (P)     How often have you had to push or strain to begin urination? 2 (P)     How many times did you most typically get up to urinate from the time you went to bed at night until the time you got up in the morning? 3 (P)     Quality of Life: If you were to spend the rest of your life with your urinary condition just the way it is now, how would you feel about that? 4 (P)     AUA SYMPTOM SCORE 17 (P)            Objective     /66   Pulse 68   Ht 5' 7\" (1.702 m)   Wt 80.7 kg (178 lb)   SpO2 97%   BMI 27.88 kg/m²   Physical Exam  Constitutional:       General: He is not in acute distress.     Appearance: Normal appearance. He is well-developed and normal weight. He is not ill-appearing, toxic-appearing or diaphoretic.   HENT:      Head: Normocephalic and atraumatic.   Eyes:      General: No scleral icterus.     Conjunctiva/sclera: Conjunctivae normal.   Cardiovascular:      Rate and Rhythm: Normal rate.   Pulmonary:      Effort: Pulmonary effort is normal.   Abdominal:      General: Abdomen is flat. There is no distension.      Palpations: There is no mass.      Tenderness: There is no " abdominal tenderness. There is no right CVA tenderness, left CVA tenderness, guarding or rebound.      Hernia: No hernia is present.   Genitourinary:     Penis: Normal.       Testes: Normal.   Musculoskeletal:      Cervical back: Neck supple.   Skin:     General: Skin is warm and dry.   Neurological:      Mental Status: He is alert and oriented to person, place, and time.   Psychiatric:         Behavior: Behavior normal.         Thought Content: Thought content normal.         Judgment: Judgment normal.       Results  Lab Results   Component Value Date    PSA 4.78 (H) 05/09/2024    PSA 4.27 (H) 11/20/2023    PSA 4.1 (H) 05/10/2023     Lab Results   Component Value Date    CALCIUM 9.3 05/09/2024    K 4.3 05/09/2024    CO2 30 05/09/2024     05/09/2024    BUN 16 05/09/2024    CREATININE 1.02 05/09/2024     Lab Results   Component Value Date    WBC 7.35 05/09/2024    HGB 14.7 05/09/2024    HCT 46.6 05/09/2024    MCV 93 05/09/2024     05/09/2024       Office Urine Dip  No results found for this or any previous visit (from the past 1 hour(s)).]    Administrative Statements            English

## 2024-06-11 NOTE — ASSESSMENT & PLAN NOTE
It has been more than 20 years since he was treated for bladder cancer.  There has not been a recurrence.  Urinalysis and urine cytology are negative.  We will defer cystoscopy at this time.

## 2024-06-17 ENCOUNTER — TELEPHONE (OUTPATIENT)
Dept: UROLOGY | Facility: MEDICAL CENTER | Age: 74
End: 2024-06-17

## 2024-06-19 NOTE — TELEPHONE ENCOUNTER
Spoke with patient  and confirmed surgery date of: 8/21/2024  Type of surgery: MRI Fusion BX  Operating physician: Dr. King  Location of surgery: [facility]    Verbally went over prep with patient  on: 6/19/2024  NPO  Bowel prep? Yes, 1 enema 1 hour prior to leaving the house for the procedure  Hospital calls afternoon prior with arrival time -Calls Friday afternoon for Monday surgeries  Patient needs ride to and from surgery (outpatient/inpatient)   Pre-op testing to be done 2 weeks prior to surgery   CBC, CMP, Urine C&S, EKG  Blood thinners:   none  Clearances needed: none    Mailed to patient on: 6/19/2024  Copy of packet scanned into Media on: 6/19/2024  Labs in packet  Soap instructions in packet  Pre-op & Post-op in packet  H&P on admit  PO 8/30/2024        Consent: on admit

## 2024-07-14 DIAGNOSIS — E03.9 ACQUIRED HYPOTHYROIDISM: ICD-10-CM

## 2024-07-14 RX ORDER — LEVOTHYROXINE SODIUM 0.12 MG/1
TABLET ORAL
Qty: 100 TABLET | Refills: 1 | Status: SHIPPED | OUTPATIENT
Start: 2024-07-14

## 2024-08-07 ENCOUNTER — APPOINTMENT (OUTPATIENT)
Dept: LAB | Facility: HOSPITAL | Age: 74
End: 2024-08-07
Payer: MEDICARE

## 2024-08-07 DIAGNOSIS — R39.89 SUSPECTED UTI: ICD-10-CM

## 2024-08-07 DIAGNOSIS — C61 PROSTATE CANCER (HCC): ICD-10-CM

## 2024-08-07 DIAGNOSIS — Z01.810 PRE-OPERATIVE CARDIOVASCULAR EXAMINATION: ICD-10-CM

## 2024-08-07 DIAGNOSIS — Z01.812 PRE-OPERATIVE LABORATORY EXAMINATION: ICD-10-CM

## 2024-08-07 LAB
ALBUMIN SERPL BCG-MCNC: 4.1 G/DL (ref 3.5–5)
ALP SERPL-CCNC: 62 U/L (ref 34–104)
ALT SERPL W P-5'-P-CCNC: 12 U/L (ref 7–52)
ANION GAP SERPL CALCULATED.3IONS-SCNC: 4 MMOL/L (ref 4–13)
AST SERPL W P-5'-P-CCNC: 20 U/L (ref 13–39)
ATRIAL RATE: 48 BPM
BASOPHILS # BLD AUTO: 0.03 THOUSANDS/ÂΜL (ref 0–0.1)
BASOPHILS NFR BLD AUTO: 0 % (ref 0–1)
BILIRUB SERPL-MCNC: 0.61 MG/DL (ref 0.2–1)
BUN SERPL-MCNC: 21 MG/DL (ref 5–25)
CALCIUM SERPL-MCNC: 9.2 MG/DL (ref 8.4–10.2)
CHLORIDE SERPL-SCNC: 103 MMOL/L (ref 96–108)
CO2 SERPL-SCNC: 31 MMOL/L (ref 21–32)
CREAT SERPL-MCNC: 1.19 MG/DL (ref 0.6–1.3)
EOSINOPHIL # BLD AUTO: 0.23 THOUSAND/ÂΜL (ref 0–0.61)
EOSINOPHIL NFR BLD AUTO: 3 % (ref 0–6)
ERYTHROCYTE [DISTWIDTH] IN BLOOD BY AUTOMATED COUNT: 13.2 % (ref 11.6–15.1)
GFR SERPL CREATININE-BSD FRML MDRD: 60 ML/MIN/1.73SQ M
GLUCOSE P FAST SERPL-MCNC: 108 MG/DL (ref 65–99)
HCT VFR BLD AUTO: 42.6 % (ref 36.5–49.3)
HGB BLD-MCNC: 14 G/DL (ref 12–17)
IMM GRANULOCYTES # BLD AUTO: 0.01 THOUSAND/UL (ref 0–0.2)
IMM GRANULOCYTES NFR BLD AUTO: 0 % (ref 0–2)
LYMPHOCYTES # BLD AUTO: 1.85 THOUSANDS/ÂΜL (ref 0.6–4.47)
LYMPHOCYTES NFR BLD AUTO: 28 % (ref 14–44)
MCH RBC QN AUTO: 30 PG (ref 26.8–34.3)
MCHC RBC AUTO-ENTMCNC: 32.9 G/DL (ref 31.4–37.4)
MCV RBC AUTO: 91 FL (ref 82–98)
MONOCYTES # BLD AUTO: 0.6 THOUSAND/ÂΜL (ref 0.17–1.22)
MONOCYTES NFR BLD AUTO: 9 % (ref 4–12)
NEUTROPHILS # BLD AUTO: 3.99 THOUSANDS/ÂΜL (ref 1.85–7.62)
NEUTS SEG NFR BLD AUTO: 60 % (ref 43–75)
NRBC BLD AUTO-RTO: 0 /100 WBCS
P AXIS: 38 DEGREES
PLATELET # BLD AUTO: 252 THOUSANDS/UL (ref 149–390)
PMV BLD AUTO: 9.7 FL (ref 8.9–12.7)
POTASSIUM SERPL-SCNC: 4.3 MMOL/L (ref 3.5–5.3)
PR INTERVAL: 144 MS
PROT SERPL-MCNC: 7.3 G/DL (ref 6.4–8.4)
QRS AXIS: 17 DEGREES
QRSD INTERVAL: 78 MS
QT INTERVAL: 418 MS
QTC INTERVAL: 373 MS
RBC # BLD AUTO: 4.67 MILLION/UL (ref 3.88–5.62)
SODIUM SERPL-SCNC: 138 MMOL/L (ref 135–147)
T WAVE AXIS: 29 DEGREES
VENTRICULAR RATE: 48 BPM
WBC # BLD AUTO: 6.71 THOUSAND/UL (ref 4.31–10.16)

## 2024-08-07 PROCEDURE — 80053 COMPREHEN METABOLIC PANEL: CPT

## 2024-08-07 PROCEDURE — 87086 URINE CULTURE/COLONY COUNT: CPT

## 2024-08-07 PROCEDURE — 93005 ELECTROCARDIOGRAM TRACING: CPT

## 2024-08-07 PROCEDURE — 36415 COLL VENOUS BLD VENIPUNCTURE: CPT

## 2024-08-07 PROCEDURE — 85025 COMPLETE CBC W/AUTO DIFF WBC: CPT

## 2024-08-07 PROCEDURE — 93010 ELECTROCARDIOGRAM REPORT: CPT | Performed by: INTERNAL MEDICINE

## 2024-08-08 LAB — BACTERIA UR CULT: NORMAL

## 2024-08-12 ENCOUNTER — ANESTHESIA EVENT (OUTPATIENT)
Dept: PERIOP | Facility: HOSPITAL | Age: 74
End: 2024-08-12
Payer: MEDICARE

## 2024-08-12 NOTE — H&P
H&P Exam - Urology   Tahir Rasheed 73 y.o. male MRN: [id number]  Unit/Bed#:  Encounter: [id number]    Assessment & Plan     Assessment:  Elevated PSA 4.78  History of prostate cancer Mindy pattern score 6 equals 3+3 and pattern 7 equals 3+4  PI-RADS 4 lesion of the prostate on multiparametric MRI    Plan:  MR fusion guided transrectal ultrasound-guided transperineal supersaturational needle biopsies of the prostate    History of Present Illness   HPI:  Tahir Rasheed is a 73 y.o. male who presents with a history of Mindy pattern score 6 and 7 equals 3+4 adenocarcinoma the prostate diagnosed 8/23.  The patient was placed on active surveillance at that time by his choice and now presents for MR fusion guided biopsy after repeat multiparametric MRI identified a a previously identified PI-RADS 4 lesion proven on biopsy to be consistent with carcinoma which has increased in size.  In view of an increase in his PSA and increase in size of MR on surveillance protocol the patient now presents for MR fusion guided biopsy.  I met the patient in the holding area described the procedure as proposed step-by-step answered all patient questions performed history and physical discussed risks and potential complications of false negative biopsy bleeding infection rectal injury need for additional operative intervention urinary retention possible need for Bai catheter.  The patient expressed understanding and provided verbal and signed informed consent.    Review of Systems    Historical Information   Past Medical History:   Diagnosis Date    Anemia     Bladder cancer (HCC) 2002    BPH with obstruction/lower urinary tract symptoms     Depression     Erectile dysfunction     Gross hematuria     Hypertension     Hypothyroid     Malignant neoplasm of lateral wall of urinary bladder (HCC)     Nocturia     Prostate cancer (HCC)      Past Surgical History:   Procedure Laterality Date    BLADDER SURGERY  05/2002    bladder cancer  surgery    CHOLECYSTECTOMY      Laparoscopic, age 40 something, per allscripts    CYSTECTOMY, PARTIAL      CYSTOSCOPY      w/ biopsy    CYSTOSCOPY  ,,,2016    KNEE ARTHROSCOPY      Therapeutic, Last assessed: 16    MOUTH SURGERY  2020    DE BX PROSTATE STRTCTC SATURATION SAMPLING IMG GID N/A 2023    Procedure: TRANSPERINEAL MRI FUSION BIOPSY PROSTATE;  Surgeon: Jose Alberto King MD;  Location: [facility];  Service: Urology    DE TENDON SHEATH INCISION Left 2022    Procedure: RELEASE LEFT SMALL TRIGGER FINGER;  Surgeon: Laila Monae;  Location:  MAIN OR;  Service: Orthopedics     Social History   Social History     Substance and Sexual Activity   Alcohol Use No     Social History     Substance and Sexual Activity   Drug Use Never     Social History     Tobacco Use   Smoking Status Former    Current packs/day: 0.00    Average packs/day: 1.5 packs/day for 15.0 years (22.5 ttl pk-yrs)    Types: Cigarettes    Start date:     Quit date:     Years since quittin.6   Smokeless Tobacco Never   Tobacco Comments    quit in , per allscripts, No secondhand smoke exposure, per allscripts     Family History:   Family History   Problem Relation Age of Onset    Alzheimer's disease Mother     Breast cancer Mother     Anxiety disorder Mother         generalized    Stroke Mother         syndrome    Cancer Brother         bladder    Prostate cancer Brother     Hypertension Family     Cancer Family     Alzheimer's disease Father     Hypertension Father        Meds/Allergies   all medications and allergies reviewed  No Known Allergies    Objective   Vitals: There were no vitals taken for this visit.    No intake/output data recorded.    Invasive Devices       None                   Physical Exam  Vitals reviewed.   Constitutional:       General: He is not in acute distress.     Appearance: Normal appearance. He is normal weight. He is not ill-appearing, toxic-appearing or diaphoretic.    HENT:      Head: Normocephalic.      Nose: Nose normal.      Mouth/Throat:      Mouth: Mucous membranes are dry.   Eyes:      Extraocular Movements: Extraocular movements intact.   Cardiovascular:      Rate and Rhythm: Normal rate and regular rhythm.   Pulmonary:      Effort: Pulmonary effort is normal. No respiratory distress.      Breath sounds: No wheezing, rhonchi or rales.   Abdominal:      General: Bowel sounds are normal. There is no distension.      Palpations: Abdomen is soft.      Tenderness: There is no abdominal tenderness. There is no guarding or rebound.   Musculoskeletal:      Cervical back: Neck supple.   Skin:     General: Skin is dry.   Neurological:      Mental Status: He is alert and oriented to person, place, and time.   Psychiatric:         Mood and Affect: Mood normal.         Behavior: Behavior normal.         Thought Content: Thought content normal.         Judgment: Judgment normal.         Lab Results: I have personally reviewed pertinent reports.    Imaging: I have personally reviewed pertinent reports.   and I have personally reviewed pertinent films in PACS  EKG, Pathology, and Other Studies: I have personally reviewed pertinent reports.   and I have personally reviewed pertinent films in PACS  VTE Prophylaxis: Sequential compression device (Venodyne)     Code Status: No Order  Advance Directive and Living Will:      Power of :    POLST:      Counseling / Coordination of Care  Total floor / unit time spent today 30 minutes.  Greater than 50% of total time was spent with the patient and / or family counseling and / or coordination of care.  A description of the counseling / coordination of care:  .

## 2024-08-12 NOTE — DISCHARGE INSTRUCTIONS
"Vigorous oral fluid intake and limited activity for the next 24 to 48 hours.  Report any excessive bleeding fevers or difficulty urinating      Patient Education     Prostate biopsy   The Basics   Written by the doctors and editors at [facility]   What is a prostate biopsy? -- A prostate biopsy is a procedure to check the prostate for cancer or other problems. The prostate is a gland that surrounds the urethra (the tube that carries urine from the bladder out through the penis) (figure 1). For a biopsy, a doctor takes a small sample of tissue from the prostate.  You might get a prostate biopsy if:   A blood test shows that your PSA level is high - PSA stands for \"prostate-specific antigen.\" It is a protein made by the prostate. PSA levels usually go up when a person has prostate cancer. But they can also go up for reasons that do not involve cancer. A prostate biopsy can help your doctor figure out the cause of a high PSA.   Your doctor finds a problem during a \"digital rectal exam\" - For a digital rectal exam, your doctor puts a gloved finger into your rectum to feel your prostate. If they feel a lump or thickened area during this exam, they might do a prostate biopsy to help figure out the cause.  How do I prepare for prostate biopsy? -- Before your procedure, your doctor will do an exam and ask you about your \"health history.\" This involves asking you questions about any health problems you have or had in the past, past surgeries or biopsies, and any medicines you take. Tell them about:   Any medicines you are taking - This includes any prescription or \"over-the-counter\" medicines you use, plus any herbal supplements you take. It helps to write down and bring a list of any medicines you take, or bring a bag with all of your medicines with you.   Any allergies you have   Any bleeding problems you have - Certain medicines, including some herbs and supplements, can increase the risk of bleeding. Some health conditions " "also increase this risk. You might need to stop certain medicines for a time before your biopsy.  The doctor or nurse will tell you if you need to do anything special to prepare. In some cases, the doctor might prescribe a medicine to help you relax during the procedure.  Before the procedure, you might need to:   Use an enema or suppository to clean out your rectum.   Get a urine test to check for bacteria.   Take an antibiotic.  You will also get information about:   Eating and drinking before your procedure - In some cases, you might need to \"fast\" before the procedure. This means not eating or drinking anything for a period of time. In other cases, you might be allowed to have liquids until a short time before the procedure. Whether you need to fast, and for how long, depends on the procedure you are having.  Ask the doctor or nurse if you have questions or if there is anything you do not understand.  What happens during prostate biopsy? -- This depends on:   If you have had a prostate biopsy before   If the biopsy is done in a doctor's office or in the hospital   The type of biopsy   How many tissue samples the doctor takes  When it is time for the procedure:   You might get an \"IV,\" which is a thin tube that goes into a vein. This can be used to give you fluids and medicines.   You will get anesthesia medicines. This is to make sure that you do not feel pain during the procedure. Types of anesthesia include:   Local - This type of anesthesia uses medicine to numb a small part of your body so you don't feel pain.   Sedatives - These are medicines to make you relax and feel sleepy.   The doctors and nurses will have you lie on your side with your knees and hips bent for the procedure. They will monitor your breathing, blood pressure, and heart rate during the procedure.   The doctor might use an imaging test such as an ultrasound, MRI, or CT scan to take pictures of your prostate. The pictures will guide the " doctor as they do the biopsy. Sometimes, the imaging test is done before the procedure. Other times, the doctor does the imaging test during the procedure.   The doctor will take a small amount of tissue from the prostate. This can be done in 1 of 2 ways:   Transrectal biopsy - The doctor inserts a small ultrasound probe into the rectum. A needle goes through the probe and removes small pieces of tissue.   Transperineal biopsy - The doctor inserts the needle through the skin between the anus and the scrotum to take the tissue samples.   The procedure takes about 15 minutes.  What happens after prostate biopsy? -- After the procedure, the staff will watch you closely as your anesthesia wears off. Most of the time, you can go home a short time after your biopsy.  As you recover:   It is common to have a small amount of bright red blood from your rectum. You might also have a small amount of blood in your urine or semen.   You will get medicine to help with pain, if needed. You can take non-prescription medicines to relieve pain, such as acetaminophen (sample brand name: Tylenol). The doctor might recommend that you avoid ibuprofen (sample brand names: Advil, Motrin) and naproxen (sample brand name: Aleve) because they can increase your risk of bleeding.   Apply a cold gel pack, bag of ice, or bag of frozen vegetables to your perineum every 1 to 2 hours, for 15 minutes each time. Put a thin towel between the ice (or other cold object) and your skin. Some people find it helpful to use ice for the first 2 days after their biopsy.  You can go back to your normal activities after the procedure, including having sex. Some people are more comfortable if they avoid activities like riding a bike, motorcycle, or horse for about a week.  Your doctor or nurse will tell you when to expect your results.  What are the risks of prostate biopsy? -- Your doctor will talk to you about all of the possible risks, and answer your  questions. Possible risks include:   Infection   Bleeding from your rectum, or blood in your urine or semen   Very bad pain   A tear in the wall of the bladder or rectum   Trouble urinating   Trouble getting an erection  When should I call the doctor? -- Call for advice if:   You have a fever of 100.4°F (38°C) or higher, or chills.   You are not able to urinate, and your bladder feels full.   You have pain or burning when you urinate that lasts for more than a few days.   You have large blood clots (the size of a quarter or bigger) in your urine or start seeing more blood in your urine.   Your urine is thick or cloudy, or has a bad smell.   You have more bleeding from your rectum.   You have very bad pain in your belly that is not helped by pain relievers.  All topics are updated as new evidence becomes available and our peer review process is complete.  This topic retrieved from [employer] on: Mar 14, 2024.  Topic 754575 Version 1.0  Release: 32.2.4 - C32.72  © 2024 UpToDate, Inc. and/or its affiliates. All rights reserved.  figure 1: Prostate gland     This drawing shows male internal organs and a close-up of the prostate gland.  Graphic 43003 Version 5.0  Consumer Information Use and Disclaimer   Disclaimer: This generalized information is a limited summary of diagnosis, treatment, and/or medication information. It is not meant to be comprehensive and should be used as a tool to help the user understand and/or assess potential diagnostic and treatment options. It does NOT include all information about conditions, treatments, medications, side effects, or risks that may apply to a specific patient. It is not intended to be medical advice or a substitute for the medical advice, diagnosis, or treatment of a health care provider based on the health care provider's examination and assessment of a patient's specific and unique circumstances. Patients must speak with a health care provider for complete information about  their health, medical questions, and treatment options, including any risks or benefits regarding use of medications. This information does not endorse any treatments or medications as safe, effective, or approved for treating a specific patient. UpToDate, Inc. and its affiliates disclaim any warranty or liability relating to this information or the use thereof.The use of this information is governed by the Terms of Use, available at https://www.[employer]ience[employer].com/en/know/clinical-effectiveness-terms. 2024© UpToDate, Inc. and its affiliates and/or licensors. All rights reserved.  Copyright   © 2024 UpToDate, Inc. and/or its affiliates. All rights reserved.

## 2024-08-13 NOTE — PRE-PROCEDURE INSTRUCTIONS
Pre-Surgery Instructions:   Medication Instructions    ALPRAZolam (XANAX) 0.25 mg tablet Uses PRN- OK to take day of surgery    buPROPion (WELLBUTRIN SR) 150 mg 12 hr tablet Take day of surgery.    escitalopram (LEXAPRO) 5 mg tablet Uses PRN- OK to take day of surgery    fluticasone (FLONASE) 50 mcg/act nasal spray Uses PRN- OK to take day of surgery    lamoTRIgine (LaMICtal) 200 MG tablet Take day of surgery.    levothyroxine 125 mcg tablet Take day of surgery.    losartan (COZAAR) 50 mg tablet Hold day of surgery.    tamsulosin (FLOMAX) 0.4 mg Take night before surgery    Thiamine HCl (vitamin B-1) 250 MG tablet Stop taking 7 days prior to surgery.    traZODone (DESYREL) 50 mg tablet Take night before surgery   Bowel prep- Written/verbal instructions provided by surgeon's office. All questions/concerns addressed. Pt advised to contact the surgeon's office with any additional questions/concerns.    Medication instructions for day surgery reviewed. Please use only a sip of water to take your instructed medications. Avoid all over the counter vitamins, supplements and NSAIDS for one week prior to surgery per anesthesia guidelines. Tylenol is ok to take as needed.     You will receive a call one business day prior to surgery with an arrival time and hospital directions. If your surgery is scheduled on a Monday, the hospital will be calling you on the Friday prior to your surgery. If you have not heard from anyone by 8pm, please call the hospital supervisor through the hospital  at [phone]. ([place] [phone] or [place] [phone]).    Do not eat or drink anything after midnight the night before your surgery, including candy, mints, lifesavers, or chewing gum. Do not drink alcohol 24hrs before your surgery. Try not to smoke at least 24hrs before your surgery.       Follow the pre surgery showering instructions as listed in the “My Surgical Experience Booklet” or otherwise provided by your surgeon's  office. Do not use a blade to shave the surgical area 1 week before surgery. It is okay to use a clean electric clippers up to 24 hours before surgery. Do not apply any lotions, creams, including makeup, cologne, deodorant, or perfumes after showering on the day of your surgery. Do not use dry shampoo, hair spray, hair gel, or any type of hair products.     No contact lenses, eye make-up, or artificial eyelashes. Remove nail polish, including gel polish, and any artificial, gel, or acrylic nails if possible. Remove all jewelry including rings and body piercing jewelry.     Wear causal clothing that is easy to take on and off. Consider your type of surgery.    Keep any valuables, jewelry, piercings at home. Please bring any specially ordered equipment (sling, braces) if indicated.    Arrange for a responsible person to drive you to and from the hospital on the day of your surgery. Please confirm the visitor policy for the day of your procedure when you receive your phone call with an arrival time.     Call the surgeon's office with any new illnesses, exposures, or additional questions prior to surgery.    Please reference your “My Surgical Experience Booklet” for additional information to prepare for your upcoming surgery.

## 2024-08-21 ENCOUNTER — ANESTHESIA (OUTPATIENT)
Dept: PERIOP | Facility: HOSPITAL | Age: 74
End: 2024-08-21
Payer: MEDICARE

## 2024-08-21 ENCOUNTER — HOSPITAL ENCOUNTER (OUTPATIENT)
Facility: HOSPITAL | Age: 74
Setting detail: OUTPATIENT SURGERY
Discharge: HOME | End: 2024-08-21
Attending: UROLOGY | Admitting: UROLOGY
Payer: MEDICARE

## 2024-08-21 VITALS
RESPIRATION RATE: 18 BRPM | TEMPERATURE: 97.8 F | DIASTOLIC BLOOD PRESSURE: 76 MMHG | OXYGEN SATURATION: 95 % | BODY MASS INDEX: 28.1 KG/M2 | HEIGHT: 67 IN | SYSTOLIC BLOOD PRESSURE: 166 MMHG | WEIGHT: 179.01 LBS | HEART RATE: 48 BPM

## 2024-08-21 DIAGNOSIS — R05.9 COUGH: ICD-10-CM

## 2024-08-21 DIAGNOSIS — C61 PROSTATE CANCER (HCC): ICD-10-CM

## 2024-08-21 PROCEDURE — 51798 US URINE CAPACITY MEASURE: CPT | Performed by: UROLOGY

## 2024-08-21 PROCEDURE — 88344 IMHCHEM/IMCYTCHM EA MLT ANTB: CPT | Performed by: STUDENT IN AN ORGANIZED HEALTH CARE EDUCATION/TRAINING PROGRAM

## 2024-08-21 PROCEDURE — NC001 PR NO CHARGE: Performed by: UROLOGY

## 2024-08-21 PROCEDURE — 76942 ECHO GUIDE FOR BIOPSY: CPT | Performed by: UROLOGY

## 2024-08-21 PROCEDURE — G0416 PROSTATE BIOPSY, ANY MTHD: HCPCS | Performed by: STUDENT IN AN ORGANIZED HEALTH CARE EDUCATION/TRAINING PROGRAM

## 2024-08-21 PROCEDURE — 55700 PR PROSTATE NEEDLE BIOPSY ANY APPROACH: CPT | Performed by: UROLOGY

## 2024-08-21 RX ORDER — ONDANSETRON 2 MG/ML
4 INJECTION INTRAMUSCULAR; INTRAVENOUS ONCE AS NEEDED
Status: DISCONTINUED | OUTPATIENT
Start: 2024-08-21 | End: 2024-08-21 | Stop reason: HOSPADM

## 2024-08-21 RX ORDER — FENTANYL CITRATE 50 UG/ML
INJECTION, SOLUTION INTRAMUSCULAR; INTRAVENOUS AS NEEDED
Status: DISCONTINUED | OUTPATIENT
Start: 2024-08-21 | End: 2024-08-21

## 2024-08-21 RX ORDER — MAGNESIUM HYDROXIDE 1200 MG/15ML
LIQUID ORAL AS NEEDED
Status: DISCONTINUED | OUTPATIENT
Start: 2024-08-21 | End: 2024-08-21 | Stop reason: HOSPADM

## 2024-08-21 RX ORDER — PROPOFOL 10 MG/ML
INJECTION, EMULSION INTRAVENOUS CONTINUOUS PRN
Status: DISCONTINUED | OUTPATIENT
Start: 2024-08-21 | End: 2024-08-21

## 2024-08-21 RX ORDER — MIDAZOLAM HYDROCHLORIDE 2 MG/2ML
INJECTION, SOLUTION INTRAMUSCULAR; INTRAVENOUS AS NEEDED
Status: DISCONTINUED | OUTPATIENT
Start: 2024-08-21 | End: 2024-08-21

## 2024-08-21 RX ORDER — ACETAMINOPHEN 325 MG/1
650 TABLET ORAL EVERY 6 HOURS PRN
Status: DISCONTINUED | OUTPATIENT
Start: 2024-08-21 | End: 2024-08-21 | Stop reason: HOSPADM

## 2024-08-21 RX ORDER — EPHEDRINE SULFATE 50 MG/ML
INJECTION INTRAVENOUS AS NEEDED
Status: DISCONTINUED | OUTPATIENT
Start: 2024-08-21 | End: 2024-08-21

## 2024-08-21 RX ORDER — LIDOCAINE HYDROCHLORIDE AND EPINEPHRINE BITARTRATE 20; .01 MG/ML; MG/ML
INJECTION, SOLUTION SUBCUTANEOUS AS NEEDED
Status: DISCONTINUED | OUTPATIENT
Start: 2024-08-21 | End: 2024-08-21 | Stop reason: HOSPADM

## 2024-08-21 RX ORDER — CEFAZOLIN SODIUM 1 G/50ML
1000 SOLUTION INTRAVENOUS ONCE
Status: COMPLETED | OUTPATIENT
Start: 2024-08-21 | End: 2024-08-21

## 2024-08-21 RX ORDER — FENTANYL CITRATE/PF 50 MCG/ML
25 SYRINGE (ML) INJECTION
Status: DISCONTINUED | OUTPATIENT
Start: 2024-08-21 | End: 2024-08-21 | Stop reason: HOSPADM

## 2024-08-21 RX ORDER — SODIUM CHLORIDE, SODIUM LACTATE, POTASSIUM CHLORIDE, CALCIUM CHLORIDE 600; 310; 30; 20 MG/100ML; MG/100ML; MG/100ML; MG/100ML
125 INJECTION, SOLUTION INTRAVENOUS CONTINUOUS
Status: DISCONTINUED | OUTPATIENT
Start: 2024-08-21 | End: 2024-08-21 | Stop reason: HOSPADM

## 2024-08-21 RX ADMIN — CEFAZOLIN SODIUM 1000 MG: 1 SOLUTION INTRAVENOUS at 12:28

## 2024-08-21 RX ADMIN — ACETAMINOPHEN 650 MG: 325 TABLET ORAL at 13:48

## 2024-08-21 RX ADMIN — MIDAZOLAM 1 MG: 1 INJECTION INTRAMUSCULAR; INTRAVENOUS at 12:25

## 2024-08-21 RX ADMIN — PROPOFOL 80 MCG/KG/MIN: 10 INJECTION, EMULSION INTRAVENOUS at 12:27

## 2024-08-21 RX ADMIN — EPHEDRINE SULFATE 5 MG: 50 INJECTION INTRAVENOUS at 12:48

## 2024-08-21 RX ADMIN — FENTANYL CITRATE 25 MCG: 50 INJECTION INTRAMUSCULAR; INTRAVENOUS at 12:27

## 2024-08-21 RX ADMIN — SODIUM CHLORIDE, SODIUM LACTATE, POTASSIUM CHLORIDE, AND CALCIUM CHLORIDE 125 ML/HR: .6; .31; .03; .02 INJECTION, SOLUTION INTRAVENOUS at 11:35

## 2024-08-21 NOTE — INTERVAL H&P NOTE
H&P reviewed. After examining the patient I find no changes in the patients condition since the H&P had been written.    Vitals:    08/21/24 1051   BP: 168/72   Pulse: (!) 52   Resp: 16   Temp: 98.1 °F (36.7 °C)   SpO2: 96%

## 2024-08-21 NOTE — OP NOTE
OPERATIVE REPORT  PATIENT NAME: Tahir Rasheed    :  1950  MRN: [id number]  Pt Location: [facility] OR ROOM 03    SURGERY DATE: 2024    Surgeons and Role:     * Jose Alberto King MD - Primary    Preop Diagnosis:  Prostate cancer (HCC) [C61] on previous biopsy  PI-RADS 5 lesion of the prostate on multiparametric MRI       Post-Op Diagnosis Codes:     * Prostate cancer (HCC) [C61] on previous biopsy  PI-RADS 5 lesion of the prostate on multiparametric MRI       Procedure(s):  Supersaturational MR fusion guided transrectal ultrasound-guided TRANSPERINEAL needle BIOPSY PROSTATE    Specimen(s):  ID Type Source Tests Collected by Time Destination   1 : gustavo: left posterior lateral peripheral zone Tissue Prostate TISSUE EXAM Jose Alberto King MD 2024 1237    2 : Left Anterior Medial Tissue Prostate TISSUE EXAM Jose Alberto King MD 2024 1237    3 : left anterior lateral Tissue Prostate TISSUE EXAM Jose Alberto King MD 2024 1237    4 : left transitional zone Tissue Prostate TISSUE EXAM Jose Alberto King MD 2024 1244    5 : RIGHT ANTERIOR MEDIAL Tissue Prostate TISSUE EXAM Jose Alberto King MD 2024 1237    6 : Right Anterior lateral Tissue Prostate TISSUE EXAM Jose Alberto King MD 2024 1237    7 : right transitional zone Tissue Prostate TISSUE EXAM Jose Alberto King MD 2024 1245    8 : right posterior medial Tissue Prostate TISSUE EXAM Jose Alberot King MD 2024 1237    9 : Right posterior lateral Tissue Prostate TISSUE EXAM Jose Alberto King MD 2024 1237    10 : Left posterior lateral Tissue Prostate TISSUE EXAM Jose Alberto King MD 2024 1237    11 : left posterior medial Tissue Prostate TISSUE EXAM Jose Alberto King MD 2024 1237    12 : Left posterior base Tissue Prostate TISSUE EXAM Jose Alberto King MD 2024 1237    13 : right posterior base Tissue Prostate TISSUE EXAM Jose Alberto King MD 2024 1237        Estimated Blood Loss:    Minimal    Drains:  * No LDAs found *    Anesthesia Type:   Choice    Operative Indications:  Prostate cancer (HCC) [C61]  PI-RADS 5 lesion on multiparametric MRI  Previous prostate biopsy showed malignancy    Operative Findings:  See operative note below      Complications:   None    Procedure and Technique:  I saw the patient in the holding area and discussed the procedure as proposed step-by-step answered all patient questions performed history and physical discussed risks and complications as outlined in the history and physical with the patient giving verbal and signed informed consent.  The patient was taken to the operating room identified by the surgeon prepped draped usual sterile fashion in the dorsolithotomy position after intravenous sedation was induced and appropriate timeout took place.  A condom cover lubricated transrectal ultrasound probe was placed per anus into the rectal vault and sagittal as well as transverse imaging of the prostate and seminal vesicles took place.  Lidocaine 2% was used to anesthetize the perineal skin perineal subcutaneous tissue as well as the periapical regions of the subcutaneous tissue near the prostate left and right of the midline.  After completion of the anesthesia staff a transverse ultrasound sweep was provided for MR fusion matching.  Using precision point 5 core biopsies of the PI-RADS 5 lesion were obtained without difficulty.  Supersaturation of regional systematic biopsies were then obtained with 2 cores per region for a total of another 24 cores.  A total of 39 cores were obtained.    After all questions removed from the perineum the transrectal ultrasound probe was used to apply pressure to the prostate transrectally for 3 minutes and then was removed atraumatically.  The patient recovered uneventfully and was discharged same day.  He will follow-up with his urologic provider in 1 to 2 weeks for discussion of biopsy results.   I was present for the entire  procedure. and I was present for all critical portions of the procedure.    Patient Disposition:  PACU         SIGNATURE: Jose Alberto King MD  DATE: August 21, 2024  TIME: 12:50 PM

## 2024-08-21 NOTE — ANESTHESIA POSTPROCEDURE EVALUATION
Post-Op Assessment Note    CV Status:  Stable  Pain Score: 0    Pain management: adequate       Mental Status:  Alert and awake   Hydration Status:  Stable and euvolemic   PONV Controlled:  None   Airway Patency:  Patent and adequate  Two or more mitigation strategies used for obstructive sleep apnea   Post Op Vitals Reviewed: Yes    No anethesia notable event occurred.    Staff: CRNA               BP   105/62   Temp   36   Pulse  62   Resp   14   SpO2   97

## 2024-08-23 PROCEDURE — G0416 PROSTATE BIOPSY, ANY MTHD: HCPCS | Performed by: STUDENT IN AN ORGANIZED HEALTH CARE EDUCATION/TRAINING PROGRAM

## 2024-08-23 PROCEDURE — 88344 IMHCHEM/IMCYTCHM EA MLT ANTB: CPT | Performed by: STUDENT IN AN ORGANIZED HEALTH CARE EDUCATION/TRAINING PROGRAM

## 2024-08-30 ENCOUNTER — OFFICE VISIT (OUTPATIENT)
Dept: UROLOGY | Facility: MEDICAL CENTER | Age: 74
End: 2024-08-30
Payer: MEDICARE

## 2024-08-30 VITALS
DIASTOLIC BLOOD PRESSURE: 80 MMHG | SYSTOLIC BLOOD PRESSURE: 140 MMHG | HEIGHT: 67 IN | BODY MASS INDEX: 28.35 KG/M2 | HEART RATE: 51 BPM | WEIGHT: 180.6 LBS | OXYGEN SATURATION: 96 %

## 2024-08-30 DIAGNOSIS — Z12.11 ENCOUNTER FOR SCREENING COLONOSCOPY: ICD-10-CM

## 2024-08-30 DIAGNOSIS — C61 PROSTATE CANCER (HCC): Primary | ICD-10-CM

## 2024-08-30 DIAGNOSIS — Z85.51 PERSONAL HISTORY OF BLADDER CANCER: ICD-10-CM

## 2024-08-30 PROCEDURE — 99214 OFFICE O/P EST MOD 30 MIN: CPT | Performed by: UROLOGY

## 2024-08-30 NOTE — PATIENT INSTRUCTIONS
"  Patient Education     Choosing treatment for low-risk localized prostate cancer   The Basics   Written by the doctors and editors at [facility]   What is low-risk localized prostate cancer? -- Low-risk localized prostate cancer is prostate cancer that is very slow growing and only in the prostate gland (figure 1). It has not spread outside of the prostate gland.  Because this type of prostate cancer usually grows slowly, it does not usually lead to death from the cancer. Many men with low-risk localized prostate cancer die from other medical problems, such as heart disease, and not from their prostate cancer.  But some low-risk localized prostate cancers will become serious and can lead to death if not treated. Unfortunately, doctors often can't tell for sure which low-risk localized prostate cancers will never cause any problems and which ones are more serious.  What are the treatment choices for low-risk localized prostate cancer? -- In general, most men can choose from the following different treatments for low-risk localized prostate cancer:   Active surveillance - If you choose this option, you will not have treatment for your cancer right away. But your doctor will do exams and tests on a regular basis. This is to check whether the cancer is growing or becoming more aggressive. If and when it does, you will then start active treatment. Many but not all men who choose active surveillance end up having treatment for their cancer at some later point.   Prostatectomy, which is surgery to remove the prostate gland   Radiation therapy - Radiation kills cancer cells. You can get radiation therapy in 2 different ways:   In \"external-beam radiation therapy,\" the radiation comes from a machine that is outside the body. This involves getting radiation every day, Monday through Friday, for 4 to 8 weeks.    In \"brachytherapy,\" the radiation comes from a source of radiation that is put into the prostate gland. This involves " "a 1-time treatment.  Some men, especially those who are older or have serious medical conditions, might choose not to do any of the above. Instead, they might choose \"watchful waiting.\" Watchful waiting is not exactly the same as active surveillance. It does not require regular testing, but involves treating symptoms if they happen.  How do I choose among the different treatments? -- First, make sure you understand all the facts about your choices (table 1). Ask your doctor any questions you have. Then to help you decide, think about how you feel about:   What the treatment involves   Benefits of the treatment - With active surveillance, you can delay and perhaps avoid the side effects from surgery or radiation. With surgery, external-beam radiation therapy, and brachytherapy, your cancer is treated right away before it can grow or spread.   Downsides and side effects of the treatment - If you choose active surveillance, you will need to be monitored by your doctor for a long time, possibly for the rest of your life. This might include having many biopsies and/or imaging tests. Another downside of active surveillance is knowing you have cancer and worrying about it. Also, sometimes the cancer starts growing quickly and becomes harder to treat.  Surgery, external-beam radiation therapy, and brachytherapy can all cause problems with sex. This includes trouble getting or keeping an erection. With surgery, this problem usually happens right away. With external-beam radiation therapy and brachytherapy, this problem can happen later on.  Surgery can cause incontinence, or leaking of urine. Also, side effects such as pain, infection, and bleeding can happen with any type of surgery.  External-beam radiation therapy and brachytherapy have short-term and long-term side effects. Short-term, they can cause frequent bowel movements and other bowel problems. They can also cause problems with urination, including the need to " urinate often or pain with urination. Long-term, these treatments sometimes cause incontinence, but this is less common than with surgery.   How the treatment could affect your sex life - An important issue for many men is how treatment can affect their sex life. Active surveillance doesn't usually affect your sex life. But surgery, external-beam radiation therapy, and brachytherapy can cause trouble getting or keeping an erection. If you have problems with these, there are treatments (medicines or devices) that might be able to help.  Your treatment might also depend on your age and general health. For example, younger men in good health often choose surgery. Older men, especially those with other medical conditions, might choose active surveillance.  Also, people with certain medical conditions cannot have some treatments. For instance, men with long-term diarrhea usually can't have external-beam radiation therapy or brachytherapy. Brachytherapy is not usually recommended for men who have very a large prostate that is causing problems with urinating.  What are the chances my cancer will come back after treatment? -- After treatment with surgery or radiation therapy, there is a very low chance that your cancer will come back.  How do I work with my doctor to make a decision? -- To make a decision, let your doctor know how you feel about the different treatments and whether there is something specific that worries you. Then listen to what your doctor has to say about their experiences with men who had situations similar to yours. Together, you can decide which treatment is right for you.  All topics are updated as new evidence becomes available and our peer review process is complete.  This topic retrieved from [employer] on: Feb 26, 2024.  Topic 19722 Version 13.0  Release: 32.2.4 - C32.56  © 2024 UpToDate, Inc. and/or its affiliates. All rights reserved.  figure 1: Prostate gland     This drawing shows male internal  organs and a close-up of the prostate gland.  Graphic 51250 Version 5.0  table 1: Choosing treatment for low-risk localized prostate cancer     Active surveillance  Prostatectomy (surgery to remove the prostate gland)  External-beam radiation therapy  Brachytherapy    What are the benefits? You can delay and perhaps avoid the side effects that can come with surgery or radiation. The cancer is treated right away. The cancer is treated right away. The cancer is treated right away.   What are the downsides or side effects? You will need regular monitoring and testing.  You know that you have cancer in your body, which might worry you.  Your cancer might start growing more quickly and be harder to treat. This is major surgery. All surgeries can cause pain, infection, or bleeding.  Side effects can include trouble with sex and leaking urine. Short-term side effects include having frequent bowel movements or urinating often.  Long-term side effects can include trouble with sex or having frequent bowel movements. You need to avoid being near children or pregnant women during treatment.  Short-term side effects can include having frequent bowel movements, urinating a lot, or having pain with urination.  Long-term side effects can include trouble with sex and leaking urine.   Will I need further treatment? Yes, if your cancer starts to grow or become more aggressive. Probably not Probably not Probably not   What is the chance that my cancer will come back after treatment? The cancer stays in your body until it is treated. Many men will have treatment for their cancer at some point. Very low Very low Very low   Graphic 96734 Version 5.0  Consumer Information Use and Disclaimer   Disclaimer: This generalized information is a limited summary of diagnosis, treatment, and/or medication information. It is not meant to be comprehensive and should be used as a tool to help the user understand and/or assess potential diagnostic and  treatment options. It does NOT include all information about conditions, treatments, medications, side effects, or risks that may apply to a specific patient. It is not intended to be medical advice or a substitute for the medical advice, diagnosis, or treatment of a health care provider based on the health care provider's examination and assessment of a patient's specific and unique circumstances. Patients must speak with a health care provider for complete information about their health, medical questions, and treatment options, including any risks or benefits regarding use of medications. This information does not endorse any treatments or medications as safe, effective, or approved for treating a specific patient. UpToDate, Inc. and its affiliates disclaim any warranty or liability relating to this information or the use thereof.The use of this information is governed by the Terms of Use, available at https://www.w[employer]tersFilmTrackuwer.com/en/know/clinical-effectiveness-terms. 2024© UpToDate, Inc. and its affiliates and/or licensors. All rights reserved.  Copyright   © 2024 UpToDate, Inc. and/or its affiliates. All rights reserved.

## 2024-08-30 NOTE — PROGRESS NOTES
Ambulatory Visit  Name: Tahir Rasheed      : 1950      MRN: [id number]  Encounter Provider: Kirill Hutchinson MD  Encounter Date: 2024   Encounter department: [facility] UROLOGY [place]    Assessment & Plan   1. Prostate cancer (HCC)  Assessment & Plan:  Pathology is reviewed . There is 1 focus of [place] 3+4 equal 7 prostate cancer.  [place] 4 component is 10%.  There is also another component of [place] 3+3 equal 6 prostate cancer.  This is generally unchanged from his initial biopsy although the [place] 4 component has increased from 5 to 10%.  His PSA has been slowly rising.  There has been not much change on his multiparametric MRI.  Options were discussed with the patient.  We again reviewed the NCCN guidelines for clinically localized favorable risk intermediate grade prostate cancer.  At this point he is content to continue active surveillance.  We will send his pathology for decipher testing as if this shows more aggressive disease he would consider radiation therapy treatment.  If all remains well he will return in 6 months.  We will plan to recheck a PSA and urinalysis prior to that visit.  Orders:  -     PSA Total, Diagnostic; Future; Expected date: 2025  -     Urinalysis with microscopic; Future; Expected date: 2025  2. Personal history of bladder cancer  Assessment & Plan:  Patient with remote history of low-grade bladder cancer.  He has not had a recurrence in 20 years.  We will plan to recheck urinalysis prior to next visit.  Unless there is clinical evidence of hematuria we will not plan to pursue repeat cystoscopy at this time.  3. Encounter for screening colonoscopy  -     Ambulatory referral for colon cancer education; Future      History of Present Illness     Tahir Rasheed is a 73 y.o. male who presents for follow-up.  He is on active surveillance for favorable risk intermediate grade prostate cancer.  He underwent confirmatory biopsy recently.  He  "tolerated this well.  He notes some mild twinging in the perineum.  There is no fever and he is voiding adequately.  He returns today to review pathology.    Review of Systems   Constitutional:  Negative for chills, diaphoresis, fatigue and fever.   HENT: Negative.     Eyes: Negative.    Respiratory: Negative.     Cardiovascular: Negative.    Endocrine: Negative.    Genitourinary:         See HPI   Musculoskeletal: Negative.    Skin: Negative.    Allergic/Immunologic: Negative.    Neurological: Negative.    Hematological: Negative.    Psychiatric/Behavioral: Negative.         AUA SYMPTOM SCORE      Flowsheet Row Most Recent Value   AUA SYMPTOM SCORE    How often have you had a sensation of not emptying your bladder completely after you finished urinating? 1 (P)     How often have you had to urinate again less than two hours after you finished urinating? 2 (P)     How often have you found you stopped and started again several times when you urinate? 2 (P)     How often have you found it difficult to postpone urination? 1 (P)     How often have you had a weak urinary stream? 1 (P)     How often have you had to push or strain to begin urination? 1 (P)     How many times did you most typically get up to urinate from the time you went to bed at night until the time you got up in the morning? 2 (P)     Quality of Life: If you were to spend the rest of your life with your urinary condition just the way it is now, how would you feel about that? 2 (P)     AUA SYMPTOM SCORE 10 (P)            Objective     /80 (BP Location: Left arm, Patient Position: Sitting, Cuff Size: Adult)   Pulse (!) 51   Ht 5' 7\" (1.702 m)   Wt 81.9 kg (180 lb 9.6 oz)   SpO2 96%   BMI 28.29 kg/m²   Physical Exam  Vitals reviewed.   Constitutional:       General: He is not in acute distress.     Appearance: Normal appearance. He is well-developed and normal weight. He is not ill-appearing, toxic-appearing or diaphoretic.   HENT:      Head: " Normocephalic and atraumatic.   Eyes:      General: No scleral icterus.     Conjunctiva/sclera: Conjunctivae normal.   Cardiovascular:      Rate and Rhythm: Normal rate.   Pulmonary:      Effort: Pulmonary effort is normal.   Abdominal:      General: Abdomen is flat. There is no distension.      Palpations: There is no mass.      Tenderness: There is no abdominal tenderness. There is no right CVA tenderness, left CVA tenderness, guarding or rebound.      Hernia: No hernia is present.   Genitourinary:     Penis: Normal.       Testes: Normal.      Comments: Perineum - negative without ecchymosis or rash.  Musculoskeletal:      Cervical back: Neck supple.   Skin:     General: Skin is warm and dry.   Neurological:      General: No focal deficit present.      Mental Status: He is alert and oriented to person, place, and time.   Psychiatric:         Mood and Affect: Mood normal.         Behavior: Behavior normal.         Thought Content: Thought content normal.         Judgment: Judgment normal.       Results  Lab Results   Component Value Date    PSA 4.78 (H) 05/09/2024    PSA 4.27 (H) 11/20/2023    PSA 4.1 (H) 05/10/2023     Lab Results   Component Value Date    CALCIUM 9.2 08/07/2024    K 4.3 08/07/2024    CO2 31 08/07/2024     08/07/2024    BUN 21 08/07/2024    CREATININE 1.19 08/07/2024     Lab Results   Component Value Date    WBC 6.71 08/07/2024    HGB 14.0 08/07/2024    HCT 42.6 08/07/2024    MCV 91 08/07/2024     08/07/2024       Office Urine Dip  No results found for this or any previous visit (from the past 1 hour(s)).]    Administrative Statements

## 2024-08-30 NOTE — ASSESSMENT & PLAN NOTE
Patient with remote history of low-grade bladder cancer.  He has not had a recurrence in 20 years.  We will plan to recheck urinalysis prior to next visit.  Unless there is clinical evidence of hematuria we will not plan to pursue repeat cystoscopy at this time.

## 2024-08-30 NOTE — ASSESSMENT & PLAN NOTE
Recommend Coricidin HBP as directed generic brand, increase fluids  Patient was given a paper prescription for antibiotic to fill if his mucus is colored and or his symptoms worsen despite Coricidin use  cp/cough/sob-2 weeks

## 2024-08-30 NOTE — ASSESSMENT & PLAN NOTE
Pathology is reviewed . There is 1 focus of [place] 3+4 equal 7 prostate cancer.  [place] 4 component is 10%.  There is also another component of [place] 3+3 equal 6 prostate cancer.  This is generally unchanged from his initial biopsy although the [place] 4 component has increased from 5 to 10%.  His PSA has been slowly rising.  There has been not much change on his multiparametric MRI.  Options were discussed with the patient.  We again reviewed the NCCN guidelines for clinically localized favorable risk intermediate grade prostate cancer.  At this point he is content to continue active surveillance.  We will send his pathology for decipher testing as if this shows more aggressive disease he would consider radiation therapy treatment.  If all remains well he will return in 6 months.  We will plan to recheck a PSA and urinalysis prior to that visit.

## 2024-09-12 ENCOUNTER — LAB REQUISITION (OUTPATIENT)
Dept: LAB | Facility: HOSPITAL | Age: 74
End: 2024-09-12

## 2024-09-12 DIAGNOSIS — C61 MALIGNANT NEOPLASM OF PROSTATE (HCC): ICD-10-CM

## 2024-09-13 ENCOUNTER — TELEPHONE (OUTPATIENT)
Dept: UROLOGY | Facility: MEDICAL CENTER | Age: 74
End: 2024-09-13

## 2024-09-13 NOTE — TELEPHONE ENCOUNTER
Placed call to patients home and wife, Idalia, answered.  Told Idalia Hutchinson's result note for the patient.  She was glad to hear the news and will tell the patient.     ----- Message from Kirill Hutchinson MD sent at 9/12/2024  5:40 PM EDT -----  Inform pt that the decipher  test was low risk. As discussed he can remain on active surveillance.. Keep usual follow up appt.

## 2024-09-16 LAB — SCAN RESULT: NORMAL

## 2024-09-24 DIAGNOSIS — I10 ESSENTIAL HYPERTENSION: ICD-10-CM

## 2024-09-24 RX ORDER — LOSARTAN POTASSIUM 50 MG/1
50 TABLET ORAL DAILY
Qty: 90 TABLET | Refills: 1 | Status: SHIPPED | OUTPATIENT
Start: 2024-09-24

## 2024-11-12 ENCOUNTER — TELEPHONE (OUTPATIENT)
Age: 74
End: 2024-11-12

## 2024-11-12 NOTE — TELEPHONE ENCOUNTER
Spoke to patient he advises that the lab orders placed by [facility] were not signed therefore, he was unable to complete. Patient was wondering if provider can placed the lab orders for CBC w differential, CMP and Chest Xray for pre-op clearance scheduled 11/20/24. Please advise.

## 2024-11-12 NOTE — TELEPHONE ENCOUNTER
Patent called, question if lab orders are needed to be completed before or after Pre Op Clearance appointment 11/20/2024.Upon chart review/encounters located [facility] referral Summary, unable to locate information regarding Lab request. Please advise Patient at [phone], if any further questions.

## 2024-11-12 NOTE — TELEPHONE ENCOUNTER
Pt called earlier about needed new lab scripts.  See message below.    Carla does NOT need to write scripts.  KALEY resent corrected scripts to  labs.

## 2024-11-13 ENCOUNTER — APPOINTMENT (OUTPATIENT)
Dept: LAB | Facility: HOSPITAL | Age: 74
End: 2024-11-13
Payer: MEDICARE

## 2024-11-13 DIAGNOSIS — Z01.812 PRE-OPERATIVE LABORATORY EXAMINATION: ICD-10-CM

## 2024-11-13 DIAGNOSIS — Z01.818 OTHER SPECIFIED PRE-OPERATIVE EXAMINATION: ICD-10-CM

## 2024-11-13 LAB
ALBUMIN SERPL BCG-MCNC: 4.5 G/DL (ref 3.5–5)
ALP SERPL-CCNC: 63 U/L (ref 34–104)
ALT SERPL W P-5'-P-CCNC: 15 U/L (ref 7–52)
ANION GAP SERPL CALCULATED.3IONS-SCNC: 4 MMOL/L (ref 4–13)
AST SERPL W P-5'-P-CCNC: 23 U/L (ref 13–39)
BASOPHILS # BLD AUTO: 0.05 THOUSANDS/ÂΜL (ref 0–0.1)
BASOPHILS NFR BLD AUTO: 1 % (ref 0–1)
BILIRUB SERPL-MCNC: 0.63 MG/DL (ref 0.2–1)
BILIRUB UR QL STRIP: NEGATIVE
BUN SERPL-MCNC: 18 MG/DL (ref 5–25)
CALCIUM SERPL-MCNC: 9.8 MG/DL (ref 8.4–10.2)
CHLORIDE SERPL-SCNC: 102 MMOL/L (ref 96–108)
CLARITY UR: CLEAR
CO2 SERPL-SCNC: 33 MMOL/L (ref 21–32)
COLOR UR: NORMAL
CREAT SERPL-MCNC: 1.24 MG/DL (ref 0.6–1.3)
EOSINOPHIL # BLD AUTO: 0.19 THOUSAND/ÂΜL (ref 0–0.61)
EOSINOPHIL NFR BLD AUTO: 3 % (ref 0–6)
ERYTHROCYTE [DISTWIDTH] IN BLOOD BY AUTOMATED COUNT: 12.5 % (ref 11.6–15.1)
GFR SERPL CREATININE-BSD FRML MDRD: 56 ML/MIN/1.73SQ M
GLUCOSE P FAST SERPL-MCNC: 101 MG/DL (ref 65–99)
GLUCOSE UR STRIP-MCNC: NEGATIVE MG/DL
HCT VFR BLD AUTO: 45.7 % (ref 36.5–49.3)
HGB BLD-MCNC: 15 G/DL (ref 12–17)
HGB UR QL STRIP.AUTO: NEGATIVE
IMM GRANULOCYTES # BLD AUTO: 0.02 THOUSAND/UL (ref 0–0.2)
IMM GRANULOCYTES NFR BLD AUTO: 0 % (ref 0–2)
KETONES UR STRIP-MCNC: NEGATIVE MG/DL
LEUKOCYTE ESTERASE UR QL STRIP: NEGATIVE
LYMPHOCYTES # BLD AUTO: 1.79 THOUSANDS/ÂΜL (ref 0.6–4.47)
LYMPHOCYTES NFR BLD AUTO: 28 % (ref 14–44)
MCH RBC QN AUTO: 30.8 PG (ref 26.8–34.3)
MCHC RBC AUTO-ENTMCNC: 32.8 G/DL (ref 31.4–37.4)
MCV RBC AUTO: 94 FL (ref 82–98)
MONOCYTES # BLD AUTO: 0.63 THOUSAND/ÂΜL (ref 0.17–1.22)
MONOCYTES NFR BLD AUTO: 10 % (ref 4–12)
NEUTROPHILS # BLD AUTO: 3.65 THOUSANDS/ÂΜL (ref 1.85–7.62)
NEUTS SEG NFR BLD AUTO: 58 % (ref 43–75)
NITRITE UR QL STRIP: NEGATIVE
NRBC BLD AUTO-RTO: 0 /100 WBCS
PH UR STRIP.AUTO: 5.5 [PH]
PLATELET # BLD AUTO: 301 THOUSANDS/UL (ref 149–390)
PMV BLD AUTO: 9.8 FL (ref 8.9–12.7)
POTASSIUM SERPL-SCNC: 4.8 MMOL/L (ref 3.5–5.3)
PROT SERPL-MCNC: 7.6 G/DL (ref 6.4–8.4)
PROT UR STRIP-MCNC: NEGATIVE MG/DL
RBC # BLD AUTO: 4.87 MILLION/UL (ref 3.88–5.62)
SODIUM SERPL-SCNC: 139 MMOL/L (ref 135–147)
SP GR UR STRIP.AUTO: 1.01 (ref 1–1.03)
UROBILINOGEN UR STRIP-ACNC: <2 MG/DL
WBC # BLD AUTO: 6.33 THOUSAND/UL (ref 4.31–10.16)

## 2024-11-13 PROCEDURE — 36415 COLL VENOUS BLD VENIPUNCTURE: CPT

## 2024-11-13 PROCEDURE — 80053 COMPREHEN METABOLIC PANEL: CPT

## 2024-11-13 PROCEDURE — 85025 COMPLETE CBC W/AUTO DIFF WBC: CPT

## 2024-11-13 PROCEDURE — 81003 URINALYSIS AUTO W/O SCOPE: CPT

## 2024-11-20 ENCOUNTER — CONSULT (OUTPATIENT)
Dept: FAMILY MEDICINE CLINIC | Facility: CLINIC | Age: 74
End: 2024-11-20
Payer: MEDICARE

## 2024-11-20 VITALS
BODY MASS INDEX: 26.68 KG/M2 | HEART RATE: 64 BPM | TEMPERATURE: 97.4 F | DIASTOLIC BLOOD PRESSURE: 70 MMHG | WEIGHT: 170 LBS | SYSTOLIC BLOOD PRESSURE: 130 MMHG | HEIGHT: 67 IN

## 2024-11-20 DIAGNOSIS — Z12.11 COLON CANCER SCREENING: ICD-10-CM

## 2024-11-20 DIAGNOSIS — M17.11 PRIMARY OSTEOARTHRITIS OF RIGHT KNEE: ICD-10-CM

## 2024-11-20 DIAGNOSIS — Z01.818 PRE-OP EXAMINATION: Primary | ICD-10-CM

## 2024-11-20 PROCEDURE — G0439 PPPS, SUBSEQ VISIT: HCPCS | Performed by: PHYSICIAN ASSISTANT

## 2024-11-20 PROCEDURE — 93000 ELECTROCARDIOGRAM COMPLETE: CPT | Performed by: PHYSICIAN ASSISTANT

## 2024-11-20 PROCEDURE — 99214 OFFICE O/P EST MOD 30 MIN: CPT | Performed by: PHYSICIAN ASSISTANT

## 2024-11-20 NOTE — PROGRESS NOTES
Name: Tahir Rasheed      : 1950      MRN: [id number]  Encounter Provider: Carla Martinez PA-C  Encounter Date: 2024   Encounter department: [facility]    Assessment & Plan  Pre-op examination  RCI risk is 1 only due to high risk surgery. CMP, CBC, EKG, CXR, urine all WNL or acceptable without acute changes. Pt is cleared for propose right total knee replacement. Form will be completed and faxed.   Orders:    POCT ECG    Primary osteoarthritis of right knee         Colon cancer screening  Pt prefers Cologuard. Order placed.   Orders:    Cologuard       Preventive health issues were discussed with patient, and age appropriate screening tests were ordered as noted in patient's After Visit Summary. Personalized health advice and appropriate referrals for health education or preventive services given if needed, as noted in patient's After Visit Summary.    History of Present Illness     Patient presents with:  Medicare Wellness Visit  Pre-op Exam: Right total knee scheduled 12/10 with Dr. Rg ROCHE.      Tahir Rasheed is here for chronic conditions f/u. Pt. had labs done prior to today's visit which included Recent Results (from the past 4 weeks)  -CBC and differential:   Collection Time: 24 10:11 AM       Result                      Value             Ref Range           WBC                         6.33              4.31 - 10.16*       RBC                         4.87              3.88 - 5.62 *       Hemoglobin                  15.0              12.0 - 17.0 *       Hematocrit                  45.7              36.5 - 49.3 %       MCV                         94                82 - 98 fL          MCH                         30.8              26.8 - 34.3 *       MCHC                        32.8              31.4 - 37.4 *       RDW                         12.5              11.6 - 15.1 %       MPV                         9.8               8.9 - 12.7 fL       Platelets                    301               149 - 390 Th*       nRBC                        0                 /100 WBCs           Segmented %                 58                43 - 75 %           Immature Grans %            0                 0 - 2 %             Lymphocytes %               28                14 - 44 %           Monocytes %                 10                4 - 12 %            Eosinophils Relative        3                 0 - 6 %             Basophils Relative          1                 0 - 1 %             Absolute Neutrophils        3.65              1.85 - 7.62 *       Absolute Immature Grans     0.02              0.00 - 0.20 *       Absolute Lymphocytes        1.79              0.60 - 4.47 *       Absolute Monocytes          0.63              0.17 - 1.22 *       Eosinophils Absolute        0.19              0.00 - 0.61 *       Basophils Absolute          0.05              0.00 - 0.10 *  -Comprehensive metabolic panel:   Collection Time: 11/13/24 10:11 AM       Result                      Value             Ref Range           Sodium                      139               135 - 147 mm*       Potassium                   4.8               3.5 - 5.3 mm*       Chloride                    102               96 - 108 mmo*       CO2                         33 (H)            21 - 32 mmol*       ANION GAP                   4                 4 - 13 mmol/L       BUN                         18                5 - 25 mg/dL        Creatinine                  1.24              0.60 - 1.30 *       Glucose, Fasting            101 (H)           65 - 99 mg/dL       Calcium                     9.8               8.4 - 10.2 m*       AST                         23                13 - 39 U/L         ALT                         15                7 - 52 U/L          Alkaline Phosphatase        63                34 - 104 U/L        Total Protein               7.6               6.4 - 8.4 g/*       Albumin                     4.5               3.5 -  5.0 g/*       Total Bilirubin             0.63              0.20 - 1.00 *       eGFR                        56                ml/min/1.73s*  -UA w Reflex to Microscopic w Reflex to Culture:   Collection Time: 11/13/24 10:11 AM  Specimen: Urine       Result                      Value             Ref Range           Color, UA                   Light Yellow                          Clarity, UA                 Clear                                 Specific [place], UA        1.012             1.003 - 1.030       pH, UA                      5.5               4.5, 5.0, 5.*       Leukocytes, UA              Negative          Negative            Nitrite, UA                 Negative          Negative            Protein, UA                 Negative          Negative mg/*       Glucose, UA                 Negative          Negative mg/*       Ketones, UA                 Negative          Negative mg/*       Urobilinogen, UA            <2.0              <2.0 mg/dl m*       Bilirubin, UA               Negative          Negative            Occult Blood, UA            Negative          Negative                  Patient Care Team:  Carla Martinez PA-C as PCP - General (Family Medicine)  MD Carla Shahid PA-C Jenifer Moceri, DO as Resident (Neurology)    Review of Systems   Constitutional: Negative.    HENT: Negative.     Eyes: Negative.    Respiratory: Negative.     Cardiovascular: Negative.    Gastrointestinal: Negative.    Endocrine: Negative.    Genitourinary: Negative.    Musculoskeletal: Negative.    Skin: Negative.    Allergic/Immunologic: Negative.    Neurological: Negative.    Hematological: Negative.    Psychiatric/Behavioral: Negative.       Medical History Reviewed by provider this encounter:       Annual Wellness Visit Questionnaire   Tahir is here for his Subsequent Wellness visit.     Health Risk Assessment:   Patient rates overall health as fair. Patient feels that their physical health  rating is same. Patient is satisfied with their life. Eyesight was rated as same. Hearing was rated as same. Patient feels that their emotional and mental health rating is same. Patients states they are never, rarely angry. Patient states they are never, rarely unusually tired/fatigued. Pain experienced in the last 7 days has been some. Patient's pain rating has been 5/10. Patient states that he has experienced no weight loss or gain in last 6 months.     Depression Screening:   PHQ-9 Score: 0      Fall Risk Screening:   In the past year, patient has experienced: history of falling in past year    Number of falls: 1  Injured during fall?: No    Feels unsteady when standing or walking?: Yes      Home Safety:  Patient has trouble with stairs inside or outside of their home. Patient has working smoke alarms and has working carbon monoxide detector. Home safety hazards include: none.     Nutrition:   Current diet is Regular.     Medications:   Patient is currently taking over-the-counter supplements. OTC medications include: see medication list. Patient is able to manage medications.     Activities of Daily Living (ADLs)/Instrumental Activities of Daily Living (IADLs):   Walk and transfer into and out of bed and chair?: Yes  Dress and groom yourself?: Yes    Bathe or shower yourself?: Yes    Feed yourself? Yes  Do your laundry/housekeeping?: Yes  Manage your money, pay your bills and track your expenses?: Yes  Make your own meals?: Yes    Do your own shopping?: Yes    Previous Hospitalizations:   Any hospitalizations or ED visits within the last 12 months?: No      Advance Care Planning:   Living will: No    Durable POA for healthcare: No    Advanced directive counseling given: Yes    ACP document given: Yes      Cognitive Screening:   Provider or family/friend/caregiver concerned regarding cognition?: No    PREVENTIVE SCREENINGS      Cardiovascular Screening:    General: Screening Not Indicated, History Lipid Disorder  and Screening Current      Diabetes Screening:     General: Screening Current      Colorectal Cancer Screening:     General: Risks and Benefits Discussed      Prostate Cancer Screening:    General: History Prostate Cancer and Screening Current      Osteoporosis Screening:    General: Screening Not Indicated      Abdominal Aortic Aneurysm (AAA) Screening:    Risk factors include: age between 65-74 yo and tobacco use        General: Screening Not Indicated      Lung Cancer Screening:     General: Screening Not Indicated      Hepatitis C Screening:    General: Screening Not Indicated    Screening, Brief Intervention, and Referral to Treatment (SBIRT)    Screening  Typical number of drinks in a day: 0  Typical number of drinks in a week: 0  Interpretation: Low risk drinking behavior.    Single Item Drug Screening:  How often have you used an illegal drug (including marijuana) or a prescription medication for non-medical reasons in the past year? never    Single Item Drug Screen Score: 0  Interpretation: Negative screen for possible drug use disorder    Social Drivers of Health     Financial Resource Strain: Low Risk  (10/31/2023)    Overall Financial Resource Strain (CARDIA)     Difficulty of Paying Living Expenses: Not very hard   Food Insecurity: No Food Insecurity (11/20/2024)    Hunger Vital Sign     Worried About Running Out of Food in the Last Year: Never true     Ran Out of Food in the Last Year: Never true   Transportation Needs: No Transportation Needs (11/20/2024)    PRAPARE - Transportation     Lack of Transportation (Medical): No     Lack of Transportation (Non-Medical): No   Housing Stability: Low Risk  (11/20/2024)    Housing Stability Vital Sign     Unable to Pay for Housing in the Last Year: No     Number of Times Moved in the Last Year: 0     Homeless in the Last Year: No   Utilities: Not At Risk (11/20/2024)    [facility] Utilities     Threatened with loss of utilities: No     No results found.    Objective  "  /70 (BP Location: Right arm, Patient Position: Sitting, Cuff Size: Adult)   Pulse 64   Temp (!) 97.4 °F (36.3 °C) (Temporal)   Ht 5' 7\" (1.702 m)   Wt 77.1 kg (170 lb)   BMI 26.63 kg/m²     Physical Exam  Vitals and nursing note reviewed.   Constitutional:       General: He is not in acute distress.     Appearance: Normal appearance. He is well-developed. He is not ill-appearing.   HENT:      Head: Normocephalic and atraumatic.      Right Ear: Hearing, tympanic membrane, ear canal and external ear normal.      Left Ear: Hearing, tympanic membrane, ear canal and external ear normal.      Nose: Nose normal.      Mouth/Throat:      Mouth: Mucous membranes are moist.      Pharynx: Oropharynx is clear.   Eyes:      General: Lids are normal.         Right eye: No discharge.         Left eye: No discharge.      Extraocular Movements: Extraocular movements intact.      Conjunctiva/sclera: Conjunctivae normal.      Pupils: Pupils are equal, round, and reactive to light.   Cardiovascular:      Rate and Rhythm: Normal rate and regular rhythm.      Heart sounds: Normal heart sounds.   Pulmonary:      Effort: Pulmonary effort is normal. No respiratory distress.      Breath sounds: Normal breath sounds.   Abdominal:      General: Bowel sounds are normal.      Palpations: Abdomen is soft.      Tenderness: There is no abdominal tenderness.   Musculoskeletal:      Cervical back: Neck supple.   Lymphadenopathy:      Cervical: No cervical adenopathy.   Skin:     General: Skin is warm and dry.   Neurological:      General: No focal deficit present.      Mental Status: He is alert. Mental status is at baseline.      Motor: Motor function is intact.      Coordination: Coordination is intact.      Deep Tendon Reflexes: Reflexes are normal and symmetric.   Psychiatric:         Mood and Affect: Mood normal.         Behavior: Behavior normal. Behavior is cooperative.         Thought Content: Thought content normal.         " Judgment: Judgment normal.

## 2024-11-20 NOTE — PATIENT INSTRUCTIONS
Pre-operative Medication Instructions    Avoid herbs or non-directed vitamins one week prior to surgery  Avoid aspirin containing medications or non-steroidal anti-inflammatory drugs one week preceding surgery  May take tylenol for pain up until the night before surgery    ACE Inhibitors or ARBs     Medication Name     losartan (COZAAR) 50 mg tablet      Continue this medication up to the evening before surgery/procedure, but do not take the morning of the day of surgery.    Alpha Adrenergic Antagonist     Medication Name     traZODone (DESYREL) 50 mg tablet      Continue to take this medication on your normal schedule.  If this is an oral medication and you take it in the morning, then you may take this medicine with a sip of water.    Alpha-1 Adrenergic Blockers     Medication Name     tamsulosin (FLOMAX) 0.4 mg      Continue to take this medication on your normal schedule.  If this is an oral medication and you take it in the morning, then you may take this medicine with a sip of water.    Antidepressant Meds     Medication Name     buPROPion (WELLBUTRIN SR) 150 mg 12 hr tablet     escitalopram (LEXAPRO) 5 mg tablet     traZODone (DESYREL) 50 mg tablet      Continue to take this medication on your normal schedule.  If this is an oral medication and you take it in the morning, then you may take this medicine with a sip of water.    Seizure Medication     Medication Name     lamoTRIgine (LaMICtal) 200 MG tablet      Continue to take this medication on your normal schedule.  If this is an oral medication and you take it in the morning, then you may take this medicine with a sip of water.    Benzodiazepine Antagonist     Medication Name     ALPRAZolam (XANAX) 0.25 mg tablet      If this medication is needed please continue to take on your normal schedule.  If you take it in the morning, then you may take this medicine with a sip of water.    Thyroid Medications     Medication Name     levothyroxine 125 mcg tablet       Continue to take this medication on your normal schedule.  If this is an oral medication and you take it in the morning, then you may take this medicine with a sip of water.

## 2024-12-07 ENCOUNTER — RESULTS FOLLOW-UP (OUTPATIENT)
Dept: FAMILY MEDICINE CLINIC | Facility: CLINIC | Age: 74
End: 2024-12-07

## 2024-12-07 LAB — COLOGUARD RESULT REPORTABLE: NEGATIVE

## 2025-01-18 DIAGNOSIS — E03.9 ACQUIRED HYPOTHYROIDISM: ICD-10-CM

## 2025-01-20 RX ORDER — LEVOTHYROXINE SODIUM 125 UG/1
125 TABLET ORAL DAILY
Qty: 90 TABLET | Refills: 1 | Status: SHIPPED | OUTPATIENT
Start: 2025-01-20

## 2025-02-27 ENCOUNTER — APPOINTMENT (OUTPATIENT)
Dept: LAB | Facility: MEDICAL CENTER | Age: 75
End: 2025-02-27
Attending: UROLOGY
Payer: MEDICARE

## 2025-02-27 DIAGNOSIS — C61 PROSTATE CANCER (HCC): ICD-10-CM

## 2025-02-27 LAB
BACTERIA UR QL AUTO: ABNORMAL /HPF
BILIRUB UR QL STRIP: NEGATIVE
CLARITY UR: CLEAR
COLOR UR: ABNORMAL
GLUCOSE UR STRIP-MCNC: NEGATIVE MG/DL
HGB UR QL STRIP.AUTO: NEGATIVE
KETONES UR STRIP-MCNC: NEGATIVE MG/DL
LEUKOCYTE ESTERASE UR QL STRIP: NEGATIVE
NITRITE UR QL STRIP: NEGATIVE
NON-SQ EPI CELLS URNS QL MICRO: ABNORMAL /HPF
PH UR STRIP.AUTO: 6.5 [PH]
PROT UR STRIP-MCNC: ABNORMAL MG/DL
PSA SERPL-MCNC: 6.84 NG/ML (ref 0–4)
RBC #/AREA URNS AUTO: ABNORMAL /HPF
SP GR UR STRIP.AUTO: 1.02 (ref 1–1.03)
UROBILINOGEN UR STRIP-ACNC: <2 MG/DL
WBC #/AREA URNS AUTO: ABNORMAL /HPF

## 2025-02-27 PROCEDURE — 81001 URINALYSIS AUTO W/SCOPE: CPT

## 2025-02-27 PROCEDURE — 36415 COLL VENOUS BLD VENIPUNCTURE: CPT

## 2025-02-27 PROCEDURE — 84153 ASSAY OF PSA TOTAL: CPT

## 2025-03-11 ENCOUNTER — OFFICE VISIT (OUTPATIENT)
Dept: UROLOGY | Facility: MEDICAL CENTER | Age: 75
End: 2025-03-11
Payer: MEDICARE

## 2025-03-11 VITALS
OXYGEN SATURATION: 98 % | HEIGHT: 67 IN | WEIGHT: 164.6 LBS | SYSTOLIC BLOOD PRESSURE: 116 MMHG | HEART RATE: 57 BPM | RESPIRATION RATE: 21 BRPM | BODY MASS INDEX: 25.83 KG/M2 | DIASTOLIC BLOOD PRESSURE: 66 MMHG

## 2025-03-11 DIAGNOSIS — N18.31 STAGE 3A CHRONIC KIDNEY DISEASE (HCC): ICD-10-CM

## 2025-03-11 DIAGNOSIS — C61 PROSTATE CANCER (HCC): Primary | ICD-10-CM

## 2025-03-11 PROCEDURE — 99214 OFFICE O/P EST MOD 30 MIN: CPT | Performed by: UROLOGY

## 2025-03-11 RX ORDER — CEPHALEXIN 500 MG/1
CAPSULE ORAL
COMMUNITY

## 2025-03-11 NOTE — PROGRESS NOTES
Name: Tahir Rasheed      : 1950      MRN: [id number]  Encounter Provider: Kirill Hutchinson MD  Encounter Date: 3/11/2025   Encounter department: [facility] [place]  :  Assessment & Plan  Prostate cancer (HCC)  PSA has risen to 6.845.  He has been riding the exercise bicycle a lot lately.  He is obviously concerned.  His biopsies do reveal favorable risk intermediate grade prostate cancer.  He is considering switching from active surveillance to treatment.  I did recommend that he recheck the PSA in approximately 4 to 6 weeks.  We will also place an order for a referral to radiation oncology.  Orders:    PSA Total, Diagnostic; Future    Ambulatory Referral to Radiation Oncology; Future    PSA Total, Diagnostic; Future    Stage 3a chronic kidney disease (HCC)  Lab Results   Component Value Date    EGFR 56 2024    EGFR 60 2024    EGFR 72 2024    CREATININE 1.24 2024    CREATININE 1.19 2024    CREATININE 1.02 2024                History of Present Illness   Tahir Rasheed is a 74 y.o. male who presents for follow-up.  He is on active surveillance for favorable risk intermediate grade prostate cancer.  He reports he is voiding well and there is been no change in his voiding pattern.  No gross hematuria or symptoms of infection.  No constitutional symptoms.  He has been exercising a lot lately on his stationary bicycle.  AUA SYMPTOM SCORE      Flowsheet Row Most Recent Value   AUA SYMPTOM SCORE    How often have you had a sensation of not emptying your bladder completely after you finished urinating? 2 (P)     How often have you had to urinate again less than two hours after you finished urinating? 1 (P)     How often have you found you stopped and started again several times when you urinate? 2 (P)     How often have you found it difficult to postpone urination? 0 (P)     How often have you had a weak urinary stream? 1 (P)     How often have you had to  "push or strain to begin urination? 1 (P)     How many times did you most typically get up to urinate from the time you went to bed at night until the time you got up in the morning? 2 (P)     Quality of Life: If you were to spend the rest of your life with your urinary condition just the way it is now, how would you feel about that? 2 (P)     AUA SYMPTOM SCORE 9 (P)            Review of Systems   Constitutional:  Negative for chills, diaphoresis, fatigue and fever.   HENT: Negative.     Eyes: Negative.    Respiratory: Negative.     Cardiovascular: Negative.    Endocrine: Negative.    Genitourinary:         See HPI   Musculoskeletal: Negative.    Skin: Negative.    Allergic/Immunologic: Negative.    Neurological: Negative.    Hematological: Negative.    Psychiatric/Behavioral: Negative.            Objective   /66 (BP Location: Left arm, Patient Position: Sitting, Cuff Size: Adult)   Pulse 57   Resp 21   Ht 5' 7\" (1.702 m)   Wt 74.7 kg (164 lb 9.6 oz)   SpO2 98%   BMI 25.78 kg/m²     Physical Exam  Vitals reviewed.   Constitutional:       General: He is not in acute distress.     Appearance: Normal appearance. He is well-developed and normal weight. He is not ill-appearing, toxic-appearing or diaphoretic.   HENT:      Head: Normocephalic and atraumatic.   Eyes:      General: No scleral icterus.     Conjunctiva/sclera: Conjunctivae normal.   Cardiovascular:      Rate and Rhythm: Normal rate.   Pulmonary:      Effort: Pulmonary effort is normal.   Abdominal:      General: Bowel sounds are normal. There is no distension.      Palpations: Abdomen is soft. There is no mass.      Tenderness: There is no abdominal tenderness. There is no right CVA tenderness, left CVA tenderness, guarding or rebound.      Hernia: No hernia is present.   Genitourinary:     Penis: Normal. No phimosis or hypospadias.       Testes: Normal.         Right: Mass not present.         Left: Mass not present.      Rectum: Normal.      " Comments: Prostate moderately enlarged, smooth, symmetric and palpably benign.  Musculoskeletal:         General: Normal range of motion.      Cervical back: Neck supple.   Skin:     General: Skin is warm and dry.   Neurological:      General: No focal deficit present.      Mental Status: He is alert and oriented to person, place, and time.   Psychiatric:         Mood and Affect: Mood normal.         Behavior: Behavior normal.         Thought Content: Thought content normal.         Judgment: Judgment normal.        Results   Lab Results   Component Value Date    PSA 6.845 (H) 02/27/2025    PSA 4.78 (H) 05/09/2024    PSA 4.27 (H) 11/20/2023     Lab Results   Component Value Date    CALCIUM 9.8 11/13/2024    K 4.8 11/13/2024    CO2 33 (H) 11/13/2024     11/13/2024    BUN 18 11/13/2024    CREATININE 1.24 11/13/2024     Lab Results   Component Value Date    WBC 6.33 11/13/2024    HGB 15.0 11/13/2024    HCT 45.7 11/13/2024    MCV 94 11/13/2024     11/13/2024       Office Urine Dip  No results found for this or any previous visit (from the past hour).

## 2025-03-11 NOTE — ASSESSMENT & PLAN NOTE
Lab Results   Component Value Date    EGFR 56 11/13/2024    EGFR 60 08/07/2024    EGFR 72 05/09/2024    CREATININE 1.24 11/13/2024    CREATININE 1.19 08/07/2024    CREATININE 1.02 05/09/2024

## 2025-03-11 NOTE — ASSESSMENT & PLAN NOTE
PSA has risen to 6.845.  He has been riding the exercise bicycle a lot lately.  He is obviously concerned.  His biopsies do reveal favorable risk intermediate grade prostate cancer.  He is considering switching from active surveillance to treatment.  I did recommend that he recheck the PSA in approximately 4 to 6 weeks.  We will also place an order for a referral to radiation oncology.  Orders:    PSA Total, Diagnostic; Future    Ambulatory Referral to Radiation Oncology; Future    PSA Total, Diagnostic; Future

## 2025-03-12 ENCOUNTER — DOCUMENTATION (OUTPATIENT)
Dept: HEMATOLOGY ONCOLOGY | Facility: CLINIC | Age: 75
End: 2025-03-12

## 2025-03-12 ENCOUNTER — TELEPHONE (OUTPATIENT)
Age: 75
End: 2025-03-12

## 2025-03-12 ENCOUNTER — PATIENT OUTREACH (OUTPATIENT)
Dept: HEMATOLOGY ONCOLOGY | Facility: CLINIC | Age: 75
End: 2025-03-12

## 2025-03-12 NOTE — PROGRESS NOTES
Oncology Nurse Navigator received referral to provider within [facility]. Tahir returned my call. I explained my role as an Oncology Nurse Navigator. I explained how to reach the office for any routine or urgent matters and the availability of patient navigation for non urgent matters. Explained oncology navigation is here to help patients through their journey and to offer assistance with any barriers to care. As a team, we strive to be patient advocates and help navigate healthcare system. Patient aware that oncologist's office will be primary contact once consult is complete and patient navigator will continue to offer support through entire journey. Conversation is based on evidence based care to optimize patient outcomes. Emotional support provided throughout conversation.     Evaluated for any barriers to care   Distress thermometer to be completed by PN   Smoking status verified   Provided contact information for nurse navigator and patient navigator  Verified PCP/insurance on file   Discussed use of My Chart. Patient currently uses the nickie. Message sent to reinforce contact information and support options     Do you have a history of cancer? yes - bladder cancer 20yrs ago- surgery only  Have you ever received Radiation Therapy? No    Do you have any implantable devices?   [] Pacemaker  [] Pain stimulator  [] Defibrillator  [] Implanted sleep apnea device  [x] N/A     Answered questions to pt's satisfaction.  Reviewed upcoming appts. Tahir had received a VM from [facility] to schedule his radiation consult as well. I offered to transfer him to scheduling. He declined and preferred to call them direct. I confirmed he has the correct number [phone].       Future Appointments   Date Time Provider Department Center   9/30/2025  2:15 PM MD HAYDEN Jara [place]  [facility]

## 2025-03-12 NOTE — TELEPHONE ENCOUNTER
Called and left a message regarding a change to his radiation appointment with Dr Parham.  The new appointment date is 4/24 @ 2:30.  Patient was offered a sooner appointment, but he stated that he wanted to wait until the middle of April until his repeat PSA test is due.  I left the [facility] number to confirm the appointment or for any questions.

## 2025-03-12 NOTE — PROGRESS NOTES
All records needed are in patients chart. No records retrieval needed at this time.     Referral received/ Chart reviewed for work up completed   Referral to: Radiation  Dx: Prostate Cancer    Imaging completed: [x][facility] []Other:    [] PET/CT   [x] MRI   [] CT   [] US   [] Mammo   [] Bone scan   [] N/A    Pathology completed: [x][facility] []Other: 08/01/2023, 08/21/2024   Date:   Location:   []N/A    Additional records needed:   [] Genomic report   [] Genetic testing results   [] Office Note   [] Procedure/ Operative note   [] Lab results   [x] N/A      [] Radiation Oncology records retrieval needed (PN to route to rad/onc clerical pool once scheduled)  Date:  Location:      Urologist: Dr. Smith             PSA Date:        Lab Results   Component Value Date    PSA 6.845 (H) 02/27/2025    PSA 4.78 (H) 05/09/2024    PSA 4.27 (H) 11/20/2023

## 2025-03-12 NOTE — PROGRESS NOTES
Oncology Nurse Navigator outreach attempted in response to referral received to radiation oncology. I left VM explaining my role and reason for my call. I included my direct number, [phone], and requested a return call.

## 2025-03-19 DIAGNOSIS — I10 ESSENTIAL HYPERTENSION: ICD-10-CM

## 2025-03-20 RX ORDER — LOSARTAN POTASSIUM 50 MG/1
50 TABLET ORAL DAILY
Qty: 90 TABLET | Refills: 1 | Status: SHIPPED | OUTPATIENT
Start: 2025-03-20

## 2025-04-14 ENCOUNTER — RESULTS FOLLOW-UP (OUTPATIENT)
Dept: FAMILY MEDICINE CLINIC | Facility: CLINIC | Age: 75
End: 2025-04-14

## 2025-04-14 ENCOUNTER — APPOINTMENT (OUTPATIENT)
Dept: LAB | Facility: MEDICAL CENTER | Age: 75
End: 2025-04-14
Attending: PHYSICIAN ASSISTANT
Payer: MEDICARE

## 2025-04-14 DIAGNOSIS — E78.2 MIXED HYPERLIPIDEMIA: ICD-10-CM

## 2025-04-14 DIAGNOSIS — C61 PROSTATE CANCER (HCC): ICD-10-CM

## 2025-04-14 DIAGNOSIS — R73.9 HYPERGLYCEMIA: ICD-10-CM

## 2025-04-14 DIAGNOSIS — I10 ESSENTIAL HYPERTENSION: ICD-10-CM

## 2025-04-14 DIAGNOSIS — N18.31 STAGE 3A CHRONIC KIDNEY DISEASE (HCC): ICD-10-CM

## 2025-04-14 DIAGNOSIS — E03.9 ACQUIRED HYPOTHYROIDISM: ICD-10-CM

## 2025-04-14 LAB
ALBUMIN SERPL BCG-MCNC: 4.3 G/DL (ref 3.5–5)
ALP SERPL-CCNC: 80 U/L (ref 34–104)
ALT SERPL W P-5'-P-CCNC: 12 U/L (ref 7–52)
ANION GAP SERPL CALCULATED.3IONS-SCNC: 7 MMOL/L (ref 4–13)
AST SERPL W P-5'-P-CCNC: 19 U/L (ref 13–39)
BASOPHILS # BLD AUTO: 0.05 THOUSANDS/ÂΜL (ref 0–0.1)
BASOPHILS NFR BLD AUTO: 1 % (ref 0–1)
BILIRUB SERPL-MCNC: 0.62 MG/DL (ref 0.2–1)
BUN SERPL-MCNC: 18 MG/DL (ref 5–25)
CALCIUM SERPL-MCNC: 9.2 MG/DL (ref 8.4–10.2)
CHLORIDE SERPL-SCNC: 100 MMOL/L (ref 96–108)
CHOLEST SERPL-MCNC: 170 MG/DL (ref ?–200)
CO2 SERPL-SCNC: 31 MMOL/L (ref 21–32)
CREAT SERPL-MCNC: 0.96 MG/DL (ref 0.6–1.3)
EOSINOPHIL # BLD AUTO: 0.19 THOUSAND/ÂΜL (ref 0–0.61)
EOSINOPHIL NFR BLD AUTO: 3 % (ref 0–6)
ERYTHROCYTE [DISTWIDTH] IN BLOOD BY AUTOMATED COUNT: 12.3 % (ref 11.6–15.1)
EST. AVERAGE GLUCOSE BLD GHB EST-MCNC: 131 MG/DL
GFR SERPL CREATININE-BSD FRML MDRD: 77 ML/MIN/1.73SQ M
GLUCOSE P FAST SERPL-MCNC: 101 MG/DL (ref 65–99)
HBA1C MFR BLD: 6.2 %
HCT VFR BLD AUTO: 43.2 % (ref 36.5–49.3)
HDLC SERPL-MCNC: 48 MG/DL
HGB BLD-MCNC: 14.2 G/DL (ref 12–17)
IMM GRANULOCYTES # BLD AUTO: 0.02 THOUSAND/UL (ref 0–0.2)
IMM GRANULOCYTES NFR BLD AUTO: 0 % (ref 0–2)
LDLC SERPL CALC-MCNC: 105 MG/DL (ref 0–100)
LYMPHOCYTES # BLD AUTO: 2 THOUSANDS/ÂΜL (ref 0.6–4.47)
LYMPHOCYTES NFR BLD AUTO: 30 % (ref 14–44)
MCH RBC QN AUTO: 30.1 PG (ref 26.8–34.3)
MCHC RBC AUTO-ENTMCNC: 32.9 G/DL (ref 31.4–37.4)
MCV RBC AUTO: 92 FL (ref 82–98)
MONOCYTES # BLD AUTO: 0.64 THOUSAND/ÂΜL (ref 0.17–1.22)
MONOCYTES NFR BLD AUTO: 10 % (ref 4–12)
NEUTROPHILS # BLD AUTO: 3.78 THOUSANDS/ÂΜL (ref 1.85–7.62)
NEUTS SEG NFR BLD AUTO: 56 % (ref 43–75)
NRBC BLD AUTO-RTO: 0 /100 WBCS
PLATELET # BLD AUTO: 264 THOUSANDS/UL (ref 149–390)
PMV BLD AUTO: 9.8 FL (ref 8.9–12.7)
POTASSIUM SERPL-SCNC: 4.5 MMOL/L (ref 3.5–5.3)
PROT SERPL-MCNC: 7.1 G/DL (ref 6.4–8.4)
PSA SERPL-MCNC: 5.53 NG/ML (ref 0–4)
RBC # BLD AUTO: 4.72 MILLION/UL (ref 3.88–5.62)
SODIUM SERPL-SCNC: 138 MMOL/L (ref 135–147)
TRIGL SERPL-MCNC: 84 MG/DL (ref ?–150)
TSH SERPL DL<=0.05 MIU/L-ACNC: 0.54 UIU/ML (ref 0.45–4.5)
WBC # BLD AUTO: 6.68 THOUSAND/UL (ref 4.31–10.16)

## 2025-04-14 PROCEDURE — 84153 ASSAY OF PSA TOTAL: CPT

## 2025-04-14 PROCEDURE — 36415 COLL VENOUS BLD VENIPUNCTURE: CPT

## 2025-04-14 PROCEDURE — 84443 ASSAY THYROID STIM HORMONE: CPT

## 2025-04-14 PROCEDURE — 83036 HEMOGLOBIN GLYCOSYLATED A1C: CPT

## 2025-04-14 PROCEDURE — 80061 LIPID PANEL: CPT

## 2025-04-14 PROCEDURE — 85025 COMPLETE CBC W/AUTO DIFF WBC: CPT

## 2025-04-14 PROCEDURE — 80053 COMPREHEN METABOLIC PANEL: CPT

## 2025-04-21 ENCOUNTER — RESULTS FOLLOW-UP (OUTPATIENT)
Dept: UROLOGY | Facility: MEDICAL CENTER | Age: 75
End: 2025-04-21

## 2025-04-21 NOTE — RESULT ENCOUNTER NOTE
Inform pt of abnormal test result.  PSA is a little better.  I would still keep the appointment with radiation oncology to see what they say.  He can keep his usual follow-up with me then.

## 2025-04-24 ENCOUNTER — OFFICE VISIT (OUTPATIENT)
Dept: RADIATION ONCOLOGY | Facility: CLINIC | Age: 75
End: 2025-04-24
Attending: RADIOLOGY
Payer: MEDICARE

## 2025-04-24 VITALS
SYSTOLIC BLOOD PRESSURE: 116 MMHG | BODY MASS INDEX: 25.65 KG/M2 | HEIGHT: 67 IN | DIASTOLIC BLOOD PRESSURE: 64 MMHG | OXYGEN SATURATION: 96 % | RESPIRATION RATE: 17 BRPM | TEMPERATURE: 98.4 F | HEART RATE: 60 BPM | WEIGHT: 163.4 LBS

## 2025-04-24 DIAGNOSIS — C61 PROSTATE CANCER (HCC): ICD-10-CM

## 2025-04-24 PROCEDURE — 99211 OFF/OP EST MAY X REQ PHY/QHP: CPT | Performed by: RADIOLOGY

## 2025-04-24 PROCEDURE — 99215 OFFICE O/P EST HI 40 MIN: CPT | Performed by: RADIOLOGY

## 2025-04-24 NOTE — ASSESSMENT & PLAN NOTE
Orders:  •  Ambulatory Referral to Radiation Oncology  Tahir Rasheed is a 74 y.o. year old male with with a clinical stage IIB, T1 cN0 M0 Rodeo score 3+4=7 prostate adenocarcinoma diagnosed after an elevated PSA of 5.1 NG/mL March 28, 2023 that was repeated on May 10, 2023 and was 4.1 NG/mL.  He had work-up that included a prostate MRI which revealed a suspicious 1 cm lesion category 4 in the posterior left peripheral zone.  There was no extraprostatic tumor with no seminal vesicle invasion, no pelvic lymphadenopathy, and no pelvic osseous metastatic disease.  Prostate volume was 43 cc.  He had his first prostate biopsies on August 1, 2023 that were positive in the region of interest for Rodeo score 3+3=6 disease and there was an additional left posterior lateral biopsy that was positive for Mindy score 3+4=7 disease.  He has been seen by Dr. Hutchinson for his favorable intermediate risk early stage prostate adenocarcinoma.  Treatment options were discussed with him including active surveillance, robotic surgery, and definitive radiation therapy.  He was seen for initial consultation September 7, 2023 to discuss definitive radiation therapy.  Prolaris testing was ordered and came back August 31, 2023 at 3.3 out of 8.7 which is low risk.  Recommendations were made for active surveillance and he has been followed with serial PSA levels as outlined below.   His PSA had gone up to 4.78 NG/mL on May 9, 2024 and he had a prostate MRI May 15, 2024 with known clinically significant prostate cancer and the biopsy-proven tumor the left posterior lateral peripheral zone at the apex is minimally increased since June 1, 2023.  There was no extraprostatic tumor.  He had a repeat prostate biopsy on August 21, 2024 that revealed prostate adenocarcinoma Mindy score 3+4 equal 7 disease in the region of interest with an additional focus of Rodeo score 3+3 equal 6 disease in the left transitional zone biopsy.  There was  perineural invasion present.  He saw Dr. Hutchinson August 30, 2020 for an elected to continue observation.  Decipher test was completed on September 5, 2024 which came back 0.35 out of 1.0 which is low risk and active surveillance was recommended.    He returns for reevaluation today with his PSA having gone up to 6.845 on February 27, 2025 and then decreased to 5.534 on April 14, 2025.  He had we discussed definitive external beam radiation therapy with him using intensity modulated radiation therapy over 6 weeks with a hypofractionated course of treatment.  Should his disease progress in the future and he decide to pursue treatment, we recommend a hypofractionated course of radiation therapy alone without any androgen deprivation therapy since he has grade group 2 [place] score 3+4=7 disease.  We discussed placement of fiducial markers for daily image guided radiation therapy to improve the accuracy of treatment and reduce both short-term and long-term side effects.  We discussed placement of a SpaceOAR to reduce dose to the rectum and prevent short-term side effects and long-term complications.  When he was initially referred here his PSA was up at 6.845 but now repeat PSA level is down to 5.534.  We reviewed his previous Prolaris testing as well as Decipher testing coming back low risk such that active surveillance is recommended.  He is comfortable with continued active surveillance and will not be receiving radiation therapy now.  Should his disease progress in the future, we can reevaluate him at that time.  He has follow-up appoint with Dr. Hutchinson on September 30, 2025 along with a repeat PSA level.

## 2025-04-24 NOTE — PROGRESS NOTES
Tahir Rasheed 1950 is a 74 y.o. male With history of Mindy 7 3+4 prostate cancer diagnosed in August 2023. He was seen by radiation oncology 9/2/23. He had a more recent biopsy on 8/21/24 that displayed Mindy 3+4=7 as well.  He was seen in consult on 9/7/2023. He returns today to further discuss radiation due to elevated PSA. He has been on observation and following with urology.    He has a history of bladder cancer in 2004, which is been on observation, with recent urinalyses and cytology being negative.      PSA   Latest Ref Rng 0.000 - 4.000 ng/mL   2/15/2021 3.7    3/20/2023 5.1 (H)    5/10/2023 4.1 (H)    11/20/2023 4.27 (H)    5/9/2024 4.78 (H)    2/27/2025 6.845 (H)    4/14/2025 5.534 (H)       12/4/23    He opted for PSA and repeat MRI 6 months. Will  repeat cytology and urinalysis due to history of bladder cancer.     5/15/24 MRI prostate multiparametric   IMPRESSION:  1. Known clinically significant prostate cancer. Biopsy-proven tumor in the posterolateral left peripheral zone at apex has minimally increased in size since June 1, 2023.  2. No extraprostatic tumor, seminal vesicle invasion, pelvic lymphadenopathy, or pelvic osseous metastatic disease.  3. Calculated prostate volume of 46 mL.  4.  Prostate cancer radiological estimation of change in sequential evaluation (PRECISE) score 3- Visible (3-V)- Stable MRI appearance with a visible focal lesion.  Prostate gland boundaries and areas of concern for significant prostate cancer were segmented using 3D advanced post-processing on an independent [employer] system workstation with active physician participation. The segmentation was performed should   MR-ultrasound fusion biopsy be required.      6/11/24    Discussed MRI displayed no changes from last years. Recommended biopsies. Discussed starting Flomax due to urinary symptoms. His urinalysis and urine cytology were negative.     8/21/24 Tissue Exam   A. Prostate, gustavo: left  posterior lateral peripheral zone; biopsy: - Prostatic adenocarcinoma, acinar; [place] grade 3 + 4 = score of 7 (10% pattern 4), grade group 2, in 2 of 6 coresinvolving approximately 17% of total needle core tissue and measuring 6 mm in total (combined) length - Periprostatic fat invasion: Not identified - Perineural invasion: Present - Additional Pathologic Findings: None - An immunohistochemical prostatic multiplex triple stain supports the diagnosis (Block A1)   B. Prostate, Left Anterior Medial; biopsy: - Benign prostatic tissue - Negative for malignancy - An immunohistochemical prostatic multiplex triple stain supports the diagnosis (Block B1)   C. Prostate, left anterior lateral; biopsy: - Benign prostatic tissue - Negative for malignancy - An immunohistochemical prostatic multiplex triple stain supports the diagnosis (Block C2)   D. Prostate, left transitional zone; biopsy: - Prostatic adenocarcinoma, acinar; Mindy grade 3 + 3 = score of 6, grade group 1, in 2 of 2 cores [id number]% of total needle core tissue and measuring 3 mm in total (combined) length - Periprostatic fat invasion: Not identified - Perineural invasion: Present - Additional Pathologic Findings: None - An immunohistochemical prostatic multiplex triple stain supports the diagnosis (Block D1)   E. Prostate, RIGHT ANTERIOR MEDIAL; biopsy: - Benign prostatic tissue - Negative for malignancy   F. Prostate, Right Anterior lateral; biopsy: - Benign prostatic tissue - Negative for malignancy   G. Prostate, right transitional zone; biopsy: - Benign prostatic tissue - Negative for malignancy   H. Prostate, right posterior medial; biopsy: - Benign prostatic tissue - Negative for malignancy   I. Prostate, Right posterior lateral; biopsy: - Benign prostatic tissue - Negative for malignancy - An immunohistochemical prostatic multiplex triple stain supports the diagnosis (Block I2)   J. Prostate, Left posterior lateral; biopsy: - Benign  prostatic tissue - Negative for malignancy [id number] Tahir Rasheed [id number] Page: 2 of 4 Printed: 9/16/2024 3:04 PM   K. Prostate, left posterior medial; biopsy: - Benign prostatic tissue - Negative for malignancy   L. Prostate, Left posterior base; biopsy: - Benign prostatic tissue - Negative for malignancy   M. Prostate, right posterior base; biopsy: - Benign prostatic tissue - Negative for malignancy    8/30/24   Discussed biopsy results of  Mindy 3+4=7. He would like observe. Decipher test ordered if is displays more aggressive disease than would think about radiation. Repeat PSA and urinalysis in 6 months.     3/11/25   PSA elevated. Will recheck in 4 to 6 weeks. Follow up with radiation.       Upcoming  9/30/25        Follow up visit     Oncology History   Prostate cancer (HCC)   8/1/2023 Initial Diagnosis    Prostate cancer (HCC)     8/1/2023 Biopsy    A. Prostate, OLGA x6, needle biopsy:    - Prostatic adenocarcinoma,   -[place] grade 3 + 3 = score  6  -Tumor involves  3 of 6 submitted cores  -Tumor involves approximately 15%, 30%, 80%% of  each core biopsy  - Perineural invasion:  Present.       B. Prostate, Left Anterior Medial x2, needle biopsy:    -Benign prostatic tissue.         C. Prostate, Left Anterior Lateral x2, needle biopsy:    -Benign prostatic tissue with mild chronic inflammation      D. Prostate, Left Transitional Zone x2, needle biopsy:    -Benign prostatic tissue         E. Prostate, Right Anterior Medial x2, needle biopsy:    -Benign prostatic tissue         F. Prostate, Right Anterior Lateral x2, needle biopsy:    -Benign prostatic tissue         G. Prostate, Right Transitional Zone x2, needle biopsy:    -Benign prostatic tissue      H. Prostate, Right Posterior Medial x2, needle biopsy:    -Benign prostatic tissue         I. Prostate, Right Posterior Lateral x2, needle biopsy:    -Benign prostatic tissue with moderate chronic inflammation         J.  Prostate, Right Posterior Base x2, needle biopsy:    -Benign prostatic tissue      K. Prostate, Left Posterior Medial x2, needle biopsy:    -Benign prostatic tissue with moderate  acute & chronic inflammation         L. Prostate, Left Posterior Lateral x2, eedle biopsy:    - Prostatic adenocarcinoma,   -[place] grade 3 + 4 = score  7  -Percentage of grade 4:  ~5%  -Cribriform pattern : not identified  -Tumor involves  2 submitted cores  -Tumor involves approximately 5% and 10%, of  each core biopsy  - Perineural invasion:  Present.          M. Prostate, Left Posterior Base x2, needle biopsy:    -Benign prostatic tissue                     9/7/2023 -  Cancer Staged    Staging form: Prostate, AJCC 8th Edition  - Clinical stage from 9/7/2023: Stage IIB (cT1c, cN0, cM0, PSA: 5.1, Grade Group: 2) - Signed by Darek Parham MD on 9/7/2023  Histopathologic type: Adenocarcinoma, NOS  Stage prefix: Initial diagnosis  Prostate specific antigen (PSA) range: Less than 10  Venus primary pattern: 3  Mindy secondary pattern: 4  Venus score: 7  Histologic grading system: 5 grade system  Location of positive needle core biopsies: One side       8/21/2024 Biopsy    A. Prostate, gustavo: left posterior lateral peripheral zone; biopsy: - Prostatic adenocarcinoma, acinar; Mindy grade 3 + 4 = score of 7 (10% pattern 4), grade group 2, in 2 of 6 coresinvolving approximately 17% of total needle core tissue and measuring 6 mm in total (combined) length - Periprostatic fat invasion: Not identified - Perineural invasion: Present - Additional Pathologic Findings: None - An immunohistochemical prostatic multiplex triple stain supports the diagnosis (Block A1)   B. Prostate, Left Anterior Medial; biopsy: - Benign prostatic tissue - Negative for malignancy - An immunohistochemical prostatic multiplex triple stain supports the diagnosis (Block B1)   C. Prostate, left anterior lateral; biopsy: - Benign prostatic tissue - Negative  for malignancy - An immunohistochemical prostatic multiplex triple stain supports the diagnosis (Block C2)   D. Prostate, left transitional zone; biopsy: - Prostatic adenocarcinoma, acinar; [place] grade 3 + 3 = score of 6, grade group 1, in 2 of 2 cores [id number]% of total needle core tissue and measuring 3 mm in total (combined) length - Periprostatic fat invasion: Not identified - Perineural invasion: Present - Additional Pathologic Findings: None - An immunohistochemical prostatic multiplex triple stain supports the diagnosis (Block D1)   E. Prostate, RIGHT ANTERIOR MEDIAL; biopsy: - Benign prostatic tissue - Negative for malignancy   F. Prostate, Right Anterior lateral; biopsy: - Benign prostatic tissue - Negative for malignancy   G. Prostate, right transitional zone; biopsy: - Benign prostatic tissue - Negative for malignancy   H. Prostate, right posterior medial; biopsy: - Benign prostatic tissue - Negative for malignancy   I. Prostate, Right posterior lateral; biopsy: - Benign prostatic tissue - Negative for malignancy - An immunohistochemical prostatic multiplex triple stain supports the diagnosis (Block I2)   J. Prostate, Left posterior lateral; biopsy: - Benign prostatic tissue - Negative for malignancy [id number] Tahir Rasheed [id number] Page: 2 of 4 Printed: 9/16/2024 3:04 PM   K. Prostate, left posterior medial; biopsy: - Benign prostatic tissue - Negative for malignancy   L. Prostate, Left posterior base; biopsy: - Benign prostatic tissue - Negative for malignancy   M. Prostate, right posterior base; biopsy: - Benign prostatic tissue - Negative for malignancy         Review of Systems:  Review of Systems   Constitutional: Negative.    Respiratory:  Negative for cough and shortness of breath.    Gastrointestinal:  Negative for abdominal pain, blood in stool, constipation, diarrhea, nausea and vomiting.   Genitourinary:  Positive for difficulty urinating and frequency. Negative for  decreased urine volume, dysuria, hematuria and urgency.   Musculoskeletal:  Positive for gait problem (knee replacement).   Skin: Negative.    Neurological:  Negative for headaches.   Psychiatric/Behavioral:  Negative for sleep disturbance.        Clinical Trial: no    IPSS Questionnaire (AUA-7):  Over the past month…    1)  How often have you had a sensation of not emptying your bladder completely after you finish urinating?  2 - Less than half the time   2)  How often have you had to urinate again less than two hours after you finished urinating? 0 - Not at all   3)  How often have you found you stopped and started again several times when you urinated?  2 - Less than half the time   4) How difficult have you found it to postpone urination?  0 - Not at all   5) How often have you had a weak urinary stream?  2 - Less than half the time   6) How often have you had to push or strain to begin urination?  2 - Less than half the time   7) How many times did you most typically get up to urinate from the time you went to bed until the time you got up in the morning?  2 - 2 times   Total Score:  10       Teaching NCL pamphlet, SIM, side effects    Health Maintenance   Topic Date Due    Hepatitis C Screening  Never done    Zoster Vaccine (3 of 3) 04/06/2022    PT PLAN OF CARE  05/11/2023    BMI: Followup Plan  03/06/2024    COVID-19 Vaccine (10 - 2024-25 season) 03/15/2025    Fall Risk  11/20/2025    Depression Screening  11/20/2025    Medicare Annual Wellness Visit (AWV)  11/20/2025    BMI: Adult  03/11/2026    Colorectal Cancer Screening  12/02/2027    RSV Vaccine for Pregnant Patients and Patients Age 60+ Years  Completed    Pneumococcal Vaccine: 65+ Years  Completed    Influenza Vaccine  Completed    Meningococcal B Vaccine  Aged Out    RSV Vaccine age 0-20 Months  Aged Out    HIB Vaccine  Aged Out    IPV Vaccine  Aged Out    Hepatitis A Vaccine  Aged Out    Meningococcal ACWY Vaccine  Aged Out    HPV Vaccine  Aged Out      Patient Active Problem List   Diagnosis    Anxiety    Major depressive disorder, recurrent, in full remission (HCC)    GERD without esophagitis    Hyperglycemia    Essential hypertension    Acquired hypothyroidism    Abnormal EKG    Diplopia    Equivocal stress test    Benign prostatic hyperplasia with nocturia    Osteoarthritis of right knee    Personal history of bladder cancer    Hip pain    Lumbar radiculopathy    Trigger finger    Mixed hyperlipidemia    Trigger little finger of left hand    Dizziness    Pituitary lesion (HCC)    Neuropathy    Essential tremor    Idiopathic intracranial hypertension    Elevated PSA    Stage 3a chronic kidney disease (HCC)    Disorder of optic nerve    Neuropathy involving both lower extremities    Prostate cancer (HCC)    B12 deficiency    Decreased hearing of both ears    Lumbosacral radiculopathy    Impaired memory     Past Medical History:   Diagnosis Date    Anemia     Bladder cancer (HCC) 2002    BPH with obstruction/lower urinary tract symptoms     Depression     Erectile dysfunction     GERD (gastroesophageal reflux disease)     Gross hematuria     Hypertension     Hypothyroid     Malignant neoplasm of lateral wall of urinary bladder (HCC)     Migraine     Nocturia     Prostate cancer (HCC)      Past Surgical History:   Procedure Laterality Date    BLADDER SURGERY  05/2002    bladder cancer surgery    CHOLECYSTECTOMY      Laparoscopic, age 40 something, per allscripts    COLONOSCOPY      CYSTECTOMY, PARTIAL      CYSTOSCOPY  2010    w/ biopsy    CYSTOSCOPY  2012,2013,2015,2016    KNEE ARTHROSCOPY      Therapeutic, Last assessed: 5/25/16    MOUTH SURGERY  09/2020    VA BX PROSTATE STRTCTC SATURATION SAMPLING IMG GID N/A 08/01/2023    Procedure: TRANSPERINEAL MRI FUSION BIOPSY PROSTATE;  Surgeon: Jose Alberto King MD;  Location: [facility];  Service: Urology    VA PROSTATE NEEDLE BIOPSY ANY APPROACH N/A 08/21/2024    Procedure: TRANSPERINEAL MRI FUSION BIOPSY PROSTATE;   Surgeon: Jose Alberto King MD;  Location: [facility];  Service: Urology    NV TENDON SHEATH INCISION Left 2022    Procedure: RELEASE LEFT SMALL TRIGGER FINGER;  Surgeon: Laila Monae;  Location: [facility];  Service: Orthopedics    REPLACEMENT TOTAL KNEE Right 12/10/2024    WISDOM TOOTH EXTRACTION       Family History   Problem Relation Age of Onset    Alzheimer's disease Mother     Breast cancer Mother     Anxiety disorder Mother         generalized    Stroke Mother         syndrome    Cancer Brother         bladder    Prostate cancer Brother     Hypertension Family     Cancer Family     Alzheimer's disease Father     Hypertension Father      Social History     Socioeconomic History    Marital status: /Civil Union     Spouse name: Not on file    Number of children: Not on file    Years of education: Not on file    Highest education level: Not on file   Occupational History    Not on file   Tobacco Use    Smoking status: Former     Current packs/day: 0.00     Average packs/day: 1.5 packs/day for 15.0 years (22.5 ttl pk-yrs)     Types: Cigarettes     Start date:      Quit date:      Years since quittin.3    Smokeless tobacco: Never    Tobacco comments:     quit in , per allscripts, No secondhand smoke exposure, per allscripts   Vaping Use    Vaping status: Never Used   Substance and Sexual Activity    Alcohol use: No    Drug use: Never    Sexual activity: Yes   Other Topics Concern    Not on file   Social History Narrative    Activities, leisure reading books    Recreational activities Running         Social Drivers of Health     Financial Resource Strain: Low Risk  (10/31/2023)    Overall Financial Resource Strain (CARDIA)     Difficulty of Paying Living Expenses: Not very hard   Food Insecurity: No Food Insecurity (2024)    Hunger Vital Sign     Worried About Running Out of Food in the Last Year: Never true     Ran Out of Food in the Last Year: Never true   Transportation Needs:  No Transportation Needs (11/20/2024)    PRAPARE - Transportation     Lack of Transportation (Medical): No     Lack of Transportation (Non-Medical): No   Physical Activity: Not on file   Stress: Not on file   Social Connections: Not on file   Intimate Partner Violence: Not on file   Housing Stability: Low Risk  (11/20/2024)    Housing Stability Vital Sign     Unable to Pay for Housing in the Last Year: No     Number of Times Moved in the Last Year: 0     Homeless in the Last Year: No       Current Outpatient Medications:     ALPRAZolam (XANAX) 0.25 mg tablet, daily as needed, Disp: , Rfl:     buPROPion (WELLBUTRIN SR) 150 mg 12 hr tablet, Take 150 mg by mouth 2 (two) times a day, Disp: , Rfl: 0    cephalexin (KEFLEX) 500 mg capsule, Take 4 capsules (500 mg) 1 hour prior to dental procedure (Patient not taking: Reported on 3/11/2025), Disp: , Rfl:     escitalopram (LEXAPRO) 5 mg tablet, Take 1 tablet by mouth daily as needed, Disp: , Rfl:     fluticasone (FLONASE) 50 mcg/act nasal spray, 1 spray into each nostril daily (Patient not taking: Reported on 3/11/2025), Disp: 11.1 mL, Rfl: 11    lamoTRIgine (LaMICtal) 200 MG tablet, Take 1 tablet by mouth 2 (two) times a day, Disp: , Rfl:     levothyroxine 125 mcg tablet, TAKE 1 TABLET BY MOUTH EVERY DAY, Disp: 90 tablet, Rfl: 1    losartan (COZAAR) 50 mg tablet, TAKE 1 TABLET BY MOUTH EVERY DAY, Disp: 90 tablet, Rfl: 1    tamsulosin (FLOMAX) 0.4 mg, Take 1 capsule (0.4 mg total) by mouth daily with dinner, Disp: 90 capsule, Rfl: 3    Thiamine HCl (vitamin B-1) 250 MG tablet, Take 250 mg by mouth daily, Disp: , Rfl:     traZODone (DESYREL) 50 mg tablet, Take 50 mg by mouth daily at bedtime, Disp: , Rfl:   No Known Allergies  Vitals:    04/24/25 1432   TempSrc: Temporal

## 2025-04-24 NOTE — PROGRESS NOTES
Follow-up Visit   Name: Tahir Rasheed      : 1950      MRN: [id number]  Encounter Provider: Darek Parham MD  Encounter Date: 2025   Encounter department: [facility] RADIATION ONCOLOGY  :  Assessment & Plan  Prostate cancer (HCC)    Orders:  •  Ambulatory Referral to Radiation Oncology  Tahir Rasheed is a 74 y.o. year old male with with a clinical stage IIB, T1 cN0 M0 [place] score 3+4=7 prostate adenocarcinoma diagnosed after an elevated PSA of 5.1 NG/mL 2023 that was repeated on May 10, 2023 and was 4.1 NG/mL.  He had work-up that included a prostate MRI which revealed a suspicious 1 cm lesion category 4 in the posterior left peripheral zone.  There was no extraprostatic tumor with no seminal vesicle invasion, no pelvic lymphadenopathy, and no pelvic osseous metastatic disease.  Prostate volume was 43 cc.  He had his first prostate biopsies on 2023 that were positive in the region of interest for [place] score 3+3=6 disease and there was an additional left posterior lateral biopsy that was positive for Mindy score 3+4=7 disease.  He has been seen by Dr. Hutchinson for his favorable intermediate risk early stage prostate adenocarcinoma.  Treatment options were discussed with him including active surveillance, robotic surgery, and definitive radiation therapy.  He was seen for initial consultation 2023 to discuss definitive radiation therapy.  Prolaris testing was ordered and came back 2023 at 3.3 out of 8.7 which is low risk.  Recommendations were made for active surveillance and he has been followed with serial PSA levels as outlined below.   His PSA had gone up to 4.78 NG/mL on May 9, 2024 and he had a prostate MRI May 15, 2024 with known clinically significant prostate cancer and the biopsy-proven tumor the left posterior lateral peripheral zone at the apex is minimally increased since 2023.  There was no  extraprostatic tumor.  He had a repeat prostate biopsy on August 21, 2024 that revealed prostate adenocarcinoma [place] score 3+4 equal 7 disease in the region of interest with an additional focus of [place] score 3+3 equal 6 disease in the left transitional zone biopsy.  There was perineural invasion present.  He saw Dr. Hutchinson August 30, 2020 for an elected to continue observation.  Decipher test was completed on September 5, 2024 which came back 0.35 out of 1.0 which is low risk and active surveillance was recommended.    He returns for reevaluation today with his PSA having gone up to 6.845 on February 27, 2025 and then decreased to 5.534 on April 14, 2025.  He had we discussed definitive external beam radiation therapy with him using intensity modulated radiation therapy over 6 weeks with a hypofractionated course of treatment.  Should his disease progress in the future and he decide to pursue treatment, we recommend a hypofractionated course of radiation therapy alone without any androgen deprivation therapy since he has grade group 2 Mindy score 3+4=7 disease.  We discussed placement of fiducial markers for daily image guided radiation therapy to improve the accuracy of treatment and reduce both short-term and long-term side effects.  We discussed placement of a SpaceOAR to reduce dose to the rectum and prevent short-term side effects and long-term complications.  When he was initially referred here his PSA was up at 6.845 but now repeat PSA level is down to 5.534.  We reviewed his previous Prolaris testing as well as Decipher testing coming back low risk such that active surveillance is recommended.  He is comfortable with continued active surveillance and will not be receiving radiation therapy now.  Should his disease progress in the future, we can reevaluate him at that time.  He has follow-up appoint with Dr. Hutchinson on September 30, 2025 along with a repeat PSA level.      History of Present Illness    Chief Complaint   Patient presents with   • Prostate Cancer     Radiation oncology     History of Present Illness    Pertinent Medical History     Tahir Rasheed 1950 is a 74 y.o. male With history of [place] 7 3+4 prostate cancer diagnosed in August 2023. He was seen by radiation oncology 9/2/23. He had a more recent biopsy on 8/21/24 that displayed Mindy 3+4=7 as well.  He was seen in consult on 9/7/2023. He returns today to further discuss radiation due to elevated PSA. He has been on observation and following with urology.     He has a history of bladder cancer in 2004, which is been on observation, with recent urinalyses and cytology being negative.        PSA   Latest Ref Rng 0.000 - 4.000 ng/mL   2/15/2021 3.7    3/20/2023 5.1 (H)    5/10/2023 4.1 (H)    11/20/2023 4.27 (H)    5/9/2024 4.78 (H)    2/27/2025 6.845 (H)    4/14/2025 5.534 (H)       12/4/23    He opted for PSA and repeat MRI 6 months. Will  repeat cytology and urinalysis due to history of bladder cancer.      5/15/24 MRI prostate multiparametric   IMPRESSION:  1. Known clinically significant prostate cancer. Biopsy-proven tumor in the posterolateral left peripheral zone at apex has minimally increased in size since June 1, 2023.  2. No extraprostatic tumor, seminal vesicle invasion, pelvic lymphadenopathy, or pelvic osseous metastatic disease.  3. Calculated prostate volume of 46 mL.  4.  Prostate cancer radiological estimation of change in sequential evaluation (PRECISE) score 3- Visible (3-V)- Stable MRI appearance with a visible focal lesion.  Prostate gland boundaries and areas of concern for significant prostate cancer were segmented using 3D advanced post-processing on an independent MOGL system workstation with active physician participation. The segmentation was performed should   MR-ultrasound fusion biopsy be required.     6/11/24    Discussed MRI displayed no changes from last years. Recommended  biopsies. Discussed starting Flomax due to urinary symptoms. His urinalysis and urine cytology were negative.      8/21/24 Tissue Exam   A. Prostate, gustavo: left posterior lateral peripheral zone; biopsy: - Prostatic adenocarcinoma, acinar; Mindy grade 3 + 4 = score of 7 (10% pattern 4), grade group 2, in 2 of 6 coresinvolving approximately 17% of total needle core tissue and measuring 6 mm in total (combined) length - Periprostatic fat invasion: Not identified - Perineural invasion: Present - Additional Pathologic Findings: None - An immunohistochemical prostatic multiplex triple stain supports the diagnosis (Block A1)   B. Prostate, Left Anterior Medial; biopsy: - Benign prostatic tissue - Negative for malignancy - An immunohistochemical prostatic multiplex triple stain supports the diagnosis (Block B1)   C. Prostate, left anterior lateral; biopsy: - Benign prostatic tissue - Negative for malignancy - An immunohistochemical prostatic multiplex triple stain supports the diagnosis (Block C2)   D. Prostate, left transitional zone; biopsy: - Prostatic adenocarcinoma, acinar; [place] grade 3 + 3 = score of 6, grade group 1, in 2 of 2 cores [id number]% of total needle core tissue and measuring 3 mm in total (combined) length - Periprostatic fat invasion: Not identified - Perineural invasion: Present - Additional Pathologic Findings: None - An immunohistochemical prostatic multiplex triple stain supports the diagnosis (Block D1)   E. Prostate, RIGHT ANTERIOR MEDIAL; biopsy: - Benign prostatic tissue - Negative for malignancy   F. Prostate, Right Anterior lateral; biopsy: - Benign prostatic tissue - Negative for malignancy   G. Prostate, right transitional zone; biopsy: - Benign prostatic tissue - Negative for malignancy   H. Prostate, right posterior medial; biopsy: - Benign prostatic tissue - Negative for malignancy   I. Prostate, Right posterior lateral; biopsy: - Benign prostatic tissue - Negative for  malignancy - An immunohistochemical prostatic multiplex triple stain supports the diagnosis (Block I2)   J. Prostate, Left posterior lateral; biopsy: - Benign prostatic tissue - Negative for malignancy [id number] KathyaTahir [id number] Page: 2 of 4 Printed: 9/16/2024 3:04 PM   K. Prostate, left posterior medial; biopsy: - Benign prostatic tissue - Negative for malignancy   L. Prostate, Left posterior base; biopsy: - Benign prostatic tissue - Negative for malignancy   M. Prostate, right posterior base; biopsy: - Benign prostatic tissue - Negative for malignancy     8/30/24   Discussed biopsy results of  [place] 3+4=7. He would like observe. Decipher test ordered if is displays more aggressive disease than would think about radiation. Repeat PSA and urinalysis in 6 months.      3/11/25   PSA elevated. Will recheck in 4 to 6 weeks. Follow up with radiation.      Patient is seen today with his wife.  He reports he is feeling well.  He denies any pain as well as no fatigue.  He has stable nocturia 2-3 times per night on Flomax.  He has no dysuria and no hematuria.  He had a right knee replacement February 10, 2024 and has recovered well.  He is having no knee pains.    Upcoming  9/30/25  with PSA    Oncology History   Cancer Staging   Prostate cancer ([facility])  Staging form: Prostate, AJCC 8th Edition  - Clinical stage from 9/7/2023: Stage IIB (cT1c, cN0, cM0, PSA: 5.1, Grade Group: 2) - Signed by Darek Parham MD on 9/7/2023  Histopathologic type: Adenocarcinoma, NOS  Stage prefix: Initial diagnosis  Prostate specific antigen (PSA) range: Less than 10  [place] primary pattern: 3  [place] secondary pattern: 4  [place] score: 7  Histologic grading system: 5 grade system  Location of positive needle core biopsies: One side  - Pathologic: No stage assigned - Unsigned  Oncology History   Prostate cancer ([facility])   8/1/2023 Initial Diagnosis    Prostate cancer ([facility])     8/1/2023 Biopsy     A. Prostate, OLGA x6, needle biopsy:    - Prostatic adenocarcinoma,   -[place] grade 3 + 3 = score  6  -Tumor involves  3 of 6 submitted cores  -Tumor involves approximately 15%, 30%, 80%% of  each core biopsy  - Perineural invasion:  Present.       B. Prostate, Left Anterior Medial x2, needle biopsy:    -Benign prostatic tissue.         C. Prostate, Left Anterior Lateral x2, needle biopsy:    -Benign prostatic tissue with mild chronic inflammation      D. Prostate, Left Transitional Zone x2, needle biopsy:    -Benign prostatic tissue         E. Prostate, Right Anterior Medial x2, needle biopsy:    -Benign prostatic tissue         F. Prostate, Right Anterior Lateral x2, needle biopsy:    -Benign prostatic tissue         G. Prostate, Right Transitional Zone x2, needle biopsy:    -Benign prostatic tissue      H. Prostate, Right Posterior Medial x2, needle biopsy:    -Benign prostatic tissue         I. Prostate, Right Posterior Lateral x2, needle biopsy:    -Benign prostatic tissue with moderate chronic inflammation         J. Prostate, Right Posterior Base x2, needle biopsy:    -Benign prostatic tissue      K. Prostate, Left Posterior Medial x2, needle biopsy:    -Benign prostatic tissue with moderate  acute & chronic inflammation         L. Prostate, Left Posterior Lateral x2, eedle biopsy:    - Prostatic adenocarcinoma,   -[place] grade 3 + 4 = score  7  -Percentage of grade 4:  ~5%  -Cribriform pattern : not identified  -Tumor involves  2 submitted cores  -Tumor involves approximately 5% and 10%, of  each core biopsy  - Perineural invasion:  Present.          M. Prostate, Left Posterior Base x2, needle biopsy:    -Benign prostatic tissue                   9/7/2023 -  Cancer Staged    Staging form: Prostate, AJCC 8th Edition  - Clinical stage from 9/7/2023: Stage IIB (cT1c, cN0, cM0, PSA: 5.1, Grade Group: 2) - Signed by Darek Parham MD on 9/7/2023  Histopathologic type: Adenocarcinoma, NOS  Stage  prefix: Initial diagnosis  Prostate specific antigen (PSA) range: Less than 10  [place] primary pattern: 3  [place] secondary pattern: 4  [place] score: 7  Histologic grading system: 5 grade system  Location of positive needle core biopsies: One side       8/21/2024 Biopsy    A. Prostate, gustavo: left posterior lateral peripheral zone; biopsy: - Prostatic adenocarcinoma, acinar; [place] grade 3 + 4 = score of 7 (10% pattern 4), grade group 2, in 2 of 6 coresinvolving approximately 17% of total needle core tissue and measuring 6 mm in total (combined) length - Periprostatic fat invasion: Not identified - Perineural invasion: Present - Additional Pathologic Findings: None - An immunohistochemical prostatic multiplex triple stain supports the diagnosis (Block A1)   B. Prostate, Left Anterior Medial; biopsy: - Benign prostatic tissue - Negative for malignancy - An immunohistochemical prostatic multiplex triple stain supports the diagnosis (Block B1)   C. Prostate, left anterior lateral; biopsy: - Benign prostatic tissue - Negative for malignancy - An immunohistochemical prostatic multiplex triple stain supports the diagnosis (Block C2)   D. Prostate, left transitional zone; biopsy: - Prostatic adenocarcinoma, acinar; [place] grade 3 + 3 = score of 6, grade group 1, in 2 of 2 cores [id number]% of total needle core tissue and measuring 3 mm in total (combined) length - Periprostatic fat invasion: Not identified - Perineural invasion: Present - Additional Pathologic Findings: None - An immunohistochemical prostatic multiplex triple stain supports the diagnosis (Block D1)   E. Prostate, RIGHT ANTERIOR MEDIAL; biopsy: - Benign prostatic tissue - Negative for malignancy   F. Prostate, Right Anterior lateral; biopsy: - Benign prostatic tissue - Negative for malignancy   G. Prostate, right transitional zone; biopsy: - Benign prostatic tissue - Negative for malignancy   H. Prostate, right posterior medial; biopsy: -  Benign prostatic tissue - Negative for malignancy   I. Prostate, Right posterior lateral; biopsy: - Benign prostatic tissue - Negative for malignancy - An immunohistochemical prostatic multiplex triple stain supports the diagnosis (Block I2)   J. Prostate, Left posterior lateral; biopsy: - Benign prostatic tissue - Negative for malignancy [id number] Tahir Rasheed [id number] Page: 2 of 4 Printed: 9/16/2024 3:04 PM   K. Prostate, left posterior medial; biopsy: - Benign prostatic tissue - Negative for malignancy   L. Prostate, Left posterior base; biopsy: - Benign prostatic tissue - Negative for malignancy   M. Prostate, right posterior base; biopsy: - Benign prostatic tissue - Negative for malignancy        Review of Systems Refer to nursing note.    Current Outpatient Medications on File Prior to Visit   Medication Sig Dispense Refill   • ALPRAZolam (XANAX) 0.25 mg tablet daily as needed     • buPROPion (WELLBUTRIN SR) 150 mg 12 hr tablet Take 150 mg by mouth 2 (two) times a day  0   • escitalopram (LEXAPRO) 5 mg tablet Take 1 tablet by mouth daily as needed     • lamoTRIgine (LaMICtal) 200 MG tablet Take 1 tablet by mouth 2 (two) times a day     • levothyroxine 125 mcg tablet TAKE 1 TABLET BY MOUTH EVERY DAY 90 tablet 1   • losartan (COZAAR) 50 mg tablet TAKE 1 TABLET BY MOUTH EVERY DAY 90 tablet 1   • tamsulosin (FLOMAX) 0.4 mg Take 1 capsule (0.4 mg total) by mouth daily with dinner 90 capsule 3   • Thiamine HCl (vitamin B-1) 250 MG tablet Take 250 mg by mouth daily     • traZODone (DESYREL) 50 mg tablet Take 50 mg by mouth daily at bedtime     • cephalexin (KEFLEX) 500 mg capsule Take 4 capsules (500 mg) 1 hour prior to dental procedure (Patient not taking: Reported on 4/24/2025)     • fluticasone (FLONASE) 50 mcg/act nasal spray 1 spray into each nostril daily (Patient not taking: Reported on 3/11/2025) 11.1 mL 11     No current facility-administered medications on file prior to visit.      Social History  "    Tobacco Use   • Smoking status: Former     Current packs/day: 0.00     Average packs/day: 1.5 packs/day for 15.0 years (22.5 ttl pk-yrs)     Types: Cigarettes     Start date:      Quit date:      Years since quittin.3   • Smokeless tobacco: Never   • Tobacco comments:     quit in , per allscripts, No secondhand smoke exposure, per allscripts   Vaping Use   • Vaping status: Never Used   Substance and Sexual Activity   • Alcohol use: No   • Drug use: Never   • Sexual activity: Yes         Objective   /64 (BP Location: Left arm, Patient Position: Sitting)   Pulse 60   Temp 98.4 °F (36.9 °C) (Temporal)   Resp 17   Ht 5' 7\" (1.702 m)   Wt 74.1 kg (163 lb 6.4 oz)   SpO2 96%   BMI 25.59 kg/m²     Pain Screening:  Pain Score: 0-No pain  ECOG ECOG Performance Status: 0 - Fully active, able to carry on all pre-disease performance without restriction  Physical Exam   Physical Exam  Vitals and nursing note reviewed.   Constitutional:       General: He is not in acute distress.     Appearance: He is well-developed. He is not diaphoretic.   HENT:      Head: Normocephalic and atraumatic.      Mouth/Throat:      Pharynx: No oropharyngeal exudate.   Eyes:      General: No scleral icterus.     Conjunctiva/sclera: Conjunctivae normal.      Pupils: Pupils are equal, round, and reactive to light.   Neck:      Thyroid: No thyromegaly.      Trachea: No tracheal deviation.   Cardiovascular:      Rate and Rhythm: Normal rate and regular rhythm.      Heart sounds: Normal heart sounds.   Pulmonary:      Effort: Pulmonary effort is normal. No respiratory distress.      Breath sounds: Normal breath sounds. No stridor. No wheezing, rhonchi or rales.   Chest:      Chest wall: No tenderness.   Abdominal:      General: Bowel sounds are normal. There is no distension.      Palpations: Abdomen is soft. There is no mass.      Tenderness: There is no abdominal tenderness.   Genitourinary:     Comments: Rectal " "examination deferred.  Musculoskeletal:         General: No swelling or tenderness. Normal range of motion.      Cervical back: Normal range of motion and neck supple.   Lymphadenopathy:      Cervical: No cervical adenopathy.      Upper Body:      Right upper body: No supraclavicular adenopathy.      Left upper body: No supraclavicular adenopathy.      Lower Body: No right inguinal adenopathy. No left inguinal adenopathy.   Skin:     General: Skin is warm and dry.      Coloration: Skin is not jaundiced or pale.      Findings: No erythema or rash.   Neurological:      General: No focal deficit present.      Mental Status: He is alert and oriented to person, place, and time.      Cranial Nerves: No cranial nerve deficit.      Sensory: No sensory deficit.      Motor: No weakness.      Coordination: Coordination normal.   Psychiatric:         Mood and Affect: Mood normal.         Behavior: Behavior normal.         Thought Content: Thought content normal.         Judgment: Judgment normal.     Results  See Above    Administrative Statements   I have spent a total time of 40 minutes in caring for this patient on the day of the visit/encounter including Diagnostic results, Prognosis, Risks and benefits of tx options, Instructions for management, Patient and family education, Importance of tx compliance, Risk factor reductions, Impressions, Counseling / Coordination of care, Documenting in the medical record, Reviewing/placing orders in the medical record (including tests, medications, and/or procedures), Obtaining or reviewing history  , and Communicating with other healthcare professionals .  Portions of the record may have been created with voice recognition software.  Occasional wrong word or \"sound a like\" substitutions may have occurred due to the inherent limitations of voice recognition software.  Read the chart carefully and recognize, using context, where substitutions have occurred.  "

## 2025-04-28 ENCOUNTER — TELEPHONE (OUTPATIENT)
Age: 75
End: 2025-04-28

## 2025-04-28 NOTE — TELEPHONE ENCOUNTER
Patient called, request to schedule PHY/ annual, unable to locate last PHY on file. Scheduled Patient 6/2025 PHY. Please advise Patient at [phone], if any further questions.

## 2025-04-29 ENCOUNTER — PATIENT OUTREACH (OUTPATIENT)
Dept: HEMATOLOGY ONCOLOGY | Facility: CLINIC | Age: 75
End: 2025-04-29

## 2025-06-03 ENCOUNTER — OFFICE VISIT (OUTPATIENT)
Dept: FAMILY MEDICINE CLINIC | Facility: CLINIC | Age: 75
End: 2025-06-03
Payer: MEDICARE

## 2025-06-03 VITALS
TEMPERATURE: 96.7 F | BODY MASS INDEX: 24.8 KG/M2 | OXYGEN SATURATION: 99 % | RESPIRATION RATE: 18 BRPM | DIASTOLIC BLOOD PRESSURE: 60 MMHG | SYSTOLIC BLOOD PRESSURE: 128 MMHG | WEIGHT: 158 LBS | HEART RATE: 52 BPM | HEIGHT: 67 IN

## 2025-06-03 DIAGNOSIS — E23.7 PITUITARY LESION (HCC): ICD-10-CM

## 2025-06-03 DIAGNOSIS — R73.9 HYPERGLYCEMIA: ICD-10-CM

## 2025-06-03 DIAGNOSIS — E03.9 ACQUIRED HYPOTHYROIDISM: ICD-10-CM

## 2025-06-03 DIAGNOSIS — C61 PROSTATE CANCER (HCC): ICD-10-CM

## 2025-06-03 DIAGNOSIS — I10 ESSENTIAL HYPERTENSION: Primary | ICD-10-CM

## 2025-06-03 DIAGNOSIS — Z00.00 HEALTHCARE MAINTENANCE: ICD-10-CM

## 2025-06-03 DIAGNOSIS — F33.42 MAJOR DEPRESSIVE DISORDER, RECURRENT, IN FULL REMISSION (HCC): ICD-10-CM

## 2025-06-03 DIAGNOSIS — E78.2 MIXED HYPERLIPIDEMIA: ICD-10-CM

## 2025-06-03 PROBLEM — Z96.659 H/O TOTAL KNEE REPLACEMENT: Status: ACTIVE | Noted: 2024-12-22

## 2025-06-03 PROBLEM — M65.30 TRIGGER FINGER: Status: RESOLVED | Noted: 2022-01-10 | Resolved: 2025-06-03

## 2025-06-03 PROBLEM — M65.352 TRIGGER LITTLE FINGER OF LEFT HAND: Status: RESOLVED | Noted: 2022-11-11 | Resolved: 2025-06-03

## 2025-06-03 PROBLEM — Z96.651 HISTORY OF TOTAL RIGHT KNEE REPLACEMENT: Status: ACTIVE | Noted: 2024-12-22

## 2025-06-03 PROBLEM — M25.559 HIP PAIN: Status: RESOLVED | Noted: 2021-06-14 | Resolved: 2025-06-03

## 2025-06-03 PROCEDURE — 99214 OFFICE O/P EST MOD 30 MIN: CPT | Performed by: PHYSICIAN ASSISTANT

## 2025-06-03 PROCEDURE — G2211 COMPLEX E/M VISIT ADD ON: HCPCS | Performed by: PHYSICIAN ASSISTANT

## 2025-06-03 NOTE — ASSESSMENT & PLAN NOTE
Fasting glucose today was 101 with an A1c of 6.2 which is prediabetic range.  Continue to watch simple carbohydrate intake his BMI is excellent at 24.75.  Recheck in 6 months.  Orders:  •  Comprehensive metabolic panel; Future  •  Hemoglobin A1C; Future

## 2025-06-03 NOTE — ASSESSMENT & PLAN NOTE
Had neuro eval and repeat MRI which showed no evidence of abnormality compared with first MRI. No follow up is needed.

## 2025-06-03 NOTE — PROGRESS NOTES
Name: Tahir Rasheed      : 1950      MRN: [id number]  Encounter Provider: Carla Martinez PA-C  Encounter Date: 6/3/2025   Encounter department: [facility] PRIMARY CARE  :    Assessment & Plan  Essential hypertension  Blood pressure well-controlled on current medications including Cozaar 50       Mixed hyperlipidemia  Patient is not on any medications for his cholesterol and his LDL is only 105 within normal triglyceride and HDL.  This is acceptable for his risk group and we will check yearly       Hyperglycemia  Fasting glucose today was 101 with an A1c of 6.2 which is prediabetic range.  Continue to watch simple carbohydrate intake his BMI is excellent at 24.75.  Recheck in 6 months.  Orders:  •  Comprehensive metabolic panel; Future  •  Hemoglobin A1C; Future    Major depressive disorder, recurrent, in full remission (HCC)  Patient is being managed by offsite psychiatry stable on does Adderall Lamictal Lexapro and Wellbutrin with Xanax as needed.       Acquired hypothyroidism  TSH is within normal limits at 125 mcg daily.  Orders:  •  TSH, 3rd generation with Free T4 reflex; Future    Pituitary lesion (HCC)  Had neuro eval and repeat MRI which showed no evidence of abnormality compared with first MRI. No follow up is needed.        Prostate cancer (HCC)  Continues with urology follow-up every 6 months.       Healthcare maintenance  This visit was not a Medicare wellness as it was conducted on 2024 and should not be completed until 366 days from this date. Will do AWV at 6 month f/u apt.           Preventive health issues were discussed with patient, and age appropriate screening tests were ordered as noted in patient's After Visit Summary. Personalized health advice and appropriate referrals for health education or preventive services given if needed, as noted in patient's After Visit Summary.    History of Present Illness     Patient presents with:  Medicare Wellness Visit: Lab work to  be reviewed.      Tahir CANDIDO Rasheed is here for chronic conditions f/u. Pt. had labs done prior to today's visit which included   Recent Results (from the past 8 weeks)  -CBC and differential:   Collection Time: 04/14/25 10:08 AM       Result                      Value             Ref Range           WBC                         6.68              4.31 - 10.16*       RBC                         4.72              3.88 - 5.62 *       Hemoglobin                  14.2              12.0 - 17.0 *       Hematocrit                  43.2              36.5 - 49.3 %       MCV                         92                82 - 98 fL          MCH                         30.1              26.8 - 34.3 *       MCHC                        32.9              31.4 - 37.4 *       RDW                         12.3              11.6 - 15.1 %       MPV                         9.8               8.9 - 12.7 fL       Platelets                   264               149 - 390 Th*       nRBC                        0                 /100 WBCs           Segmented %                 56                43 - 75 %           Immature Grans %            0                 0 - 2 %             Lymphocytes %               30                14 - 44 %           Monocytes %                 10                4 - 12 %            Eosinophils Relative        3                 0 - 6 %             Basophils Relative          1                 0 - 1 %             Absolute Neutrophils        3.78              1.85 - 7.62 *       Absolute Immature Grans     0.02              0.00 - 0.20 *       Absolute Lymphocytes        2.00              0.60 - 4.47 *       Absolute Monocytes          0.64              0.17 - 1.22 *       Eosinophils Absolute        0.19              0.00 - 0.61 *       Basophils Absolute          0.05              0.00 - 0.10 *  -Comprehensive metabolic panel:   Collection Time: 04/14/25 10:08 AM       Result                      Value             Ref Range            Sodium                      138               135 - 147 mm*       Potassium                   4.5               3.5 - 5.3 mm*       Chloride                    100               96 - 108 mmo*       CO2                         31                21 - 32 mmol*       ANION GAP                   7                 4 - 13 mmol/L       BUN                         18                5 - 25 mg/dL        Creatinine                  0.96              0.60 - 1.30 *       Glucose, Fasting            101 (H)           65 - 99 mg/dL       Calcium                     9.2               8.4 - 10.2 m*       AST                         19                13 - 39 U/L         ALT                         12                7 - 52 U/L          Alkaline Phosphatase        80                34 - 104 U/L        Total Protein               7.1               6.4 - 8.4 g/*       Albumin                     4.3               3.5 - 5.0 g/*       Total Bilirubin             0.62              0.20 - 1.00 *       eGFR                        77                ml/min/1.73s*  -Lipid Panel with Direct LDL reflex:   Collection Time: 04/14/25 10:08 AM       Result                      Value             Ref Range           Cholesterol                 170               See Comment *       Triglycerides               84                See Comment *       HDL, Direct                 48                >=40 mg/dL          LDL Calculated              105 (H)           0 - 100 mg/dL  -TSH, 3rd generation with Free T4 reflex:   Collection Time: 04/14/25 10:08 AM       Result                      Value             Ref Range           TSH 3RD GENERATON           0.538             0.450 - 4.50*  -Hemoglobin A1C:   Collection Time: 04/14/25 10:08 AM       Result                      Value             Ref Range           Hemoglobin A1C              6.2 (H)           Normal 4.0-5*       EAG                         131               mg/dl          -PSA Total, Diagnostic:  "  Collection Time: 04/14/25 10:08 AM       Result                      Value             Ref Range           PSA, Diagnostic             5.534 (H)         0.000 - 4.00*               Patient Care Team:  Carla Martinez PA-C as PCP - General (Family Medicine)  MD Carla Shahid PA-C Jenifer Moceri, DO as Resident (Neurology)  Darek Parham MD as Consulting Physician (Radiation Oncology)  Meka Bullock MA (Oncology)    Review of Systems   Constitutional: Negative.    HENT: Negative.     Eyes: Negative.    Respiratory: Negative.     Cardiovascular: Negative.    Gastrointestinal: Negative.    Endocrine: Negative.    Genitourinary: Negative.    Musculoskeletal: Negative.    Skin: Negative.    Allergic/Immunologic: Negative.    Neurological: Negative.    Hematological: Negative.    Psychiatric/Behavioral: Negative.       Medical History Reviewed by provider this encounter:             No results found.    Objective   /60 (BP Location: Left arm, Patient Position: Sitting, Cuff Size: Adult)   Pulse (!) 52   Temp (!) 96.7 °F (35.9 °C) (Tympanic)   Resp 18   Ht 5' 7\" (1.702 m)   Wt 71.7 kg (158 lb)   SpO2 99%   BMI 24.75 kg/m²     Physical Exam  Vitals and nursing note reviewed.   Constitutional:       Appearance: Normal appearance.   HENT:      Head: Normocephalic and atraumatic.     Eyes:      General: Lids are normal.      Conjunctiva/sclera: Conjunctivae normal.      Pupils: Pupils are equal, round, and reactive to light.       Cardiovascular:      Rate and Rhythm: Normal rate and regular rhythm.      Heart sounds: Normal heart sounds.   Pulmonary:      Effort: Pulmonary effort is normal.      Breath sounds: Normal breath sounds.     Skin:     General: Skin is warm and dry.     Neurological:      General: No focal deficit present.      Mental Status: He is alert. Mental status is at baseline.     Psychiatric:         Mood and Affect: Mood normal.         Behavior: " Behavior normal.         Thought Content: Thought content normal.         Judgment: Judgment normal.         Answers submitted by the patient for this visit:  Annual Physical (Submitted on 6/3/2025)  Diet/Nutrition choices: no special diet  Exercise choices: moderate cardiovascular exercise, 3-4 times a week on average, 30-60 minutes on average  Sleep choices: sleeps well, 7-8 hours of sleep on average, unrefreshing sleep  Hearing choices: decreased hearing bilateral ears, tinnitus  Vision choices: wears glasses  Dental choices: regular dental visits  Do you currently have an OB/GYN provider that you routinely follow with?: No  Any history of sexual transmitted disease/infection?: No  Urinary symptoms: urinary frequency, post-void dribbling, straining on urination  Do you have an advance directive/living will?: No  Do you have a durable power of  (POA)?: No

## 2025-06-03 NOTE — PATIENT INSTRUCTIONS
1. Essential hypertension  Assessment & Plan:  Blood pressure well-controlled on current medications including Cozaar 50  2. Mixed hyperlipidemia  Assessment & Plan:  Patient is not on any medications for his cholesterol and his LDL is only 105 within normal triglyceride and HDL.  This is acceptable for his risk group and we will check yearly  3. Hyperglycemia  Assessment & Plan:  Fasting glucose today was 101 with an A1c of 6.2 which is prediabetic range.  Continue to watch simple carbohydrate intake his BMI is excellent at 24.75.  Recheck in 6 months.  4. Major depressive disorder, recurrent, in full remission (HCC)  Assessment & Plan:  Patient is being managed by offsite psychiatry stable on does Adderall Lamictal Lexapro and Wellbutrin with Xanax as needed.  5. Acquired hypothyroidism  Assessment & Plan:  TSH is within normal limits at 125 mcg daily.  6. Pituitary lesion (HCC)  Assessment & Plan:  Continues with neurology follow-up.    7. Prostate cancer (HCC)  Assessment & Plan:  Continues with urology follow-up every 6 months.  8. Healthcare maintenance

## 2025-06-03 NOTE — ASSESSMENT & PLAN NOTE
Patient is not on any medications for his cholesterol and his LDL is only 105 within normal triglyceride and HDL.  This is acceptable for his risk group and we will check yearly

## 2025-06-03 NOTE — ASSESSMENT & PLAN NOTE
TSH is within normal limits at 125 mcg daily.  Orders:  •  TSH, 3rd generation with Free T4 reflex; Future

## 2025-06-03 NOTE — ASSESSMENT & PLAN NOTE
Patient is being managed by offsite psychiatry stable on does Adderall Lamictal Lexapro and Wellbutrin with Xanax as needed.

## 2025-06-11 DIAGNOSIS — R35.1 BENIGN PROSTATIC HYPERPLASIA WITH NOCTURIA: ICD-10-CM

## 2025-06-11 DIAGNOSIS — N40.1 BENIGN PROSTATIC HYPERPLASIA WITH NOCTURIA: ICD-10-CM

## 2025-06-11 RX ORDER — TAMSULOSIN HYDROCHLORIDE 0.4 MG/1
0.4 CAPSULE ORAL
Qty: 90 CAPSULE | Refills: 1 | Status: SHIPPED | OUTPATIENT
Start: 2025-06-11

## 2025-07-17 DIAGNOSIS — E03.9 ACQUIRED HYPOTHYROIDISM: ICD-10-CM

## 2025-07-18 RX ORDER — LEVOTHYROXINE SODIUM 125 UG/1
125 TABLET ORAL DAILY
Qty: 90 TABLET | Refills: 1 | Status: SHIPPED | OUTPATIENT
Start: 2025-07-18
